# Patient Record
Sex: FEMALE | Race: WHITE | Employment: OTHER | URBAN - METROPOLITAN AREA
[De-identification: names, ages, dates, MRNs, and addresses within clinical notes are randomized per-mention and may not be internally consistent; named-entity substitution may affect disease eponyms.]

---

## 2017-01-04 ENCOUNTER — PATIENT MESSAGE (OUTPATIENT)
Dept: FAMILY MEDICINE CLINIC | Age: 77
End: 2017-01-04

## 2017-01-06 ENCOUNTER — TELEPHONE (OUTPATIENT)
Dept: FAMILY MEDICINE CLINIC | Age: 77
End: 2017-01-06

## 2017-01-06 RX ORDER — GUAIFENESIN AND CODEINE PHOSPHATE 100; 10 MG/5ML; MG/5ML
5 SOLUTION ORAL 4 TIMES DAILY PRN
Qty: 180 ML | Refills: 0 | OUTPATIENT
Start: 2017-01-06 | End: 2017-02-17 | Stop reason: ALTCHOICE

## 2017-01-06 NOTE — TELEPHONE ENCOUNTER
Patient advised may need an office visit for anything more than for the cough. She is ok with just a cough suppressant being sent to her [address] if physician will do that.

## 2017-01-06 NOTE — TELEPHONE ENCOUNTER
Patient called with coughing and congestion. She is coughing to the point of almost throwing up. She would like a prescription called in. Last seen 12-2-16. She said she had the symptoms then.

## 2017-01-13 ENCOUNTER — OFFICE VISIT (OUTPATIENT)
Dept: ORTHOPEDIC SURGERY | Age: 77
End: 2017-01-13

## 2017-01-13 VITALS
WEIGHT: 164.9 LBS | BODY MASS INDEX: 32.38 KG/M2 | DIASTOLIC BLOOD PRESSURE: 67 MMHG | SYSTOLIC BLOOD PRESSURE: 122 MMHG | HEIGHT: 60 IN | HEART RATE: 73 BPM

## 2017-01-13 DIAGNOSIS — G89.29 CHRONIC RIGHT-SIDED LOW BACK PAIN WITH RIGHT-SIDED SCIATICA: Primary | ICD-10-CM

## 2017-01-13 DIAGNOSIS — M54.41 CHRONIC RIGHT-SIDED LOW BACK PAIN WITH RIGHT-SIDED SCIATICA: Primary | ICD-10-CM

## 2017-01-13 DIAGNOSIS — M51.36 DDD (DEGENERATIVE DISC DISEASE), LUMBAR: ICD-10-CM

## 2017-01-13 PROCEDURE — 99213 OFFICE O/P EST LOW 20 MIN: CPT | Performed by: PHYSICIAN ASSISTANT

## 2017-01-13 RX ORDER — HYDROCODONE BITARTRATE AND ACETAMINOPHEN 5; 325 MG/1; MG/1
TABLET ORAL
Qty: 30 TABLET | Refills: 0 | Status: SHIPPED | OUTPATIENT
Start: 2017-01-13 | End: 2017-02-17 | Stop reason: SDUPTHER

## 2017-02-03 RX ORDER — MECLIZINE HYDROCHLORIDE 25 MG/1
TABLET ORAL
Qty: 30 TABLET | Refills: 2 | Status: SHIPPED | OUTPATIENT
Start: 2017-02-03 | End: 2018-05-09

## 2017-02-16 RX ORDER — VALACYCLOVIR HYDROCHLORIDE 1 G/1
TABLET, FILM COATED ORAL
Qty: 4 TABLET | Refills: 1 | Status: SHIPPED | OUTPATIENT
Start: 2017-02-16 | End: 2017-02-17 | Stop reason: SDUPTHER

## 2017-02-17 ENCOUNTER — OFFICE VISIT (OUTPATIENT)
Dept: FAMILY MEDICINE CLINIC | Age: 77
End: 2017-02-17

## 2017-02-17 ENCOUNTER — TELEPHONE (OUTPATIENT)
Dept: CASE MANAGEMENT | Age: 77
End: 2017-02-17

## 2017-02-17 VITALS — HEART RATE: 64 BPM | SYSTOLIC BLOOD PRESSURE: 140 MMHG | DIASTOLIC BLOOD PRESSURE: 70 MMHG

## 2017-02-17 DIAGNOSIS — R10.9 ABDOMINAL CRAMPING: ICD-10-CM

## 2017-02-17 DIAGNOSIS — R53.83 FATIGUE, UNSPECIFIED TYPE: Primary | ICD-10-CM

## 2017-02-17 DIAGNOSIS — R42 LIGHTHEADEDNESS: ICD-10-CM

## 2017-02-17 LAB
BASOPHILS ABSOLUTE: 0.1 K/UL (ref 0–0.2)
BASOPHILS RELATIVE PERCENT: 0.6 %
EOSINOPHILS ABSOLUTE: 0.4 K/UL (ref 0–0.6)
EOSINOPHILS RELATIVE PERCENT: 4.9 %
HCT VFR BLD CALC: 38.3 % (ref 36–48)
HEMOGLOBIN: 12.4 G/DL (ref 12–16)
LYMPHOCYTES ABSOLUTE: 2.6 K/UL (ref 1–5.1)
LYMPHOCYTES RELATIVE PERCENT: 33.2 %
MCH RBC QN AUTO: 28.9 PG (ref 26–34)
MCHC RBC AUTO-ENTMCNC: 32.4 G/DL (ref 31–36)
MCV RBC AUTO: 89.1 FL (ref 80–100)
MONOCYTES ABSOLUTE: 0.6 K/UL (ref 0–1.3)
MONOCYTES RELATIVE PERCENT: 7.9 %
NEUTROPHILS ABSOLUTE: 4.2 K/UL (ref 1.7–7.7)
NEUTROPHILS RELATIVE PERCENT: 53.4 %
PDW BLD-RTO: 14.6 % (ref 12.4–15.4)
PLATELET # BLD: 329 K/UL (ref 135–450)
PMV BLD AUTO: 8.1 FL (ref 5–10.5)
RBC # BLD: 4.3 M/UL (ref 4–5.2)
SEDIMENTATION RATE, ERYTHROCYTE: 13 MM/HR (ref 0–30)
TSH SERPL DL<=0.05 MIU/L-ACNC: 2.61 UIU/ML (ref 0.27–4.2)
WBC # BLD: 7.8 K/UL (ref 4–11)

## 2017-02-17 PROCEDURE — 36415 COLL VENOUS BLD VENIPUNCTURE: CPT | Performed by: FAMILY MEDICINE

## 2017-02-17 PROCEDURE — 99214 OFFICE O/P EST MOD 30 MIN: CPT | Performed by: FAMILY MEDICINE

## 2017-02-17 RX ORDER — DICYCLOMINE HYDROCHLORIDE 10 MG/1
10 CAPSULE ORAL 4 TIMES DAILY PRN
Qty: 90 CAPSULE | Refills: 0 | Status: SHIPPED | OUTPATIENT
Start: 2017-02-17 | End: 2019-06-11 | Stop reason: SDUPTHER

## 2017-02-17 RX ORDER — VALACYCLOVIR HYDROCHLORIDE 1 G/1
1000 TABLET, FILM COATED ORAL 3 TIMES DAILY
Qty: 21 TABLET | Refills: 3 | Status: SHIPPED | OUTPATIENT
Start: 2017-02-17 | End: 2018-05-09 | Stop reason: ALTCHOICE

## 2017-02-20 DIAGNOSIS — R42 LIGHTHEADEDNESS: Primary | ICD-10-CM

## 2017-02-22 ENCOUNTER — PATIENT MESSAGE (OUTPATIENT)
Dept: FAMILY MEDICINE CLINIC | Age: 77
End: 2017-02-22

## 2017-02-22 DIAGNOSIS — F17.210 NICOTINE DEPENDENCE, CIGARETTES, UNCOMPLICATED: ICD-10-CM

## 2017-02-22 DIAGNOSIS — Z87.891 PERSONAL HISTORY OF TOBACCO USE: ICD-10-CM

## 2017-02-22 DIAGNOSIS — F17.200 SMOKER: Primary | ICD-10-CM

## 2017-03-03 ENCOUNTER — HOSPITAL ENCOUNTER (OUTPATIENT)
Dept: CT IMAGING | Age: 77
Discharge: HOME | End: 2017-03-03
Attending: FAMILY MEDICINE | Admitting: FAMILY MEDICINE

## 2017-03-03 DIAGNOSIS — Z87.891 PERSONAL HISTORY OF TOBACCO USE: ICD-10-CM

## 2017-03-03 DIAGNOSIS — R42 DIZZINESS AND GIDDINESS: ICD-10-CM

## 2017-03-03 DIAGNOSIS — F17.210 NICOTINE DEPENDENCE, CIGARETTES, UNCOMPLICATED: ICD-10-CM

## 2017-03-14 ENCOUNTER — HOSPITAL ENCOUNTER (OUTPATIENT)
Dept: SURGERY | Age: 77
Discharge: HOME | End: 2017-03-14
Attending: INTERNAL MEDICINE | Admitting: INTERNAL MEDICINE

## 2017-03-14 VITALS
TEMPERATURE: 98.2 F | WEIGHT: 163 LBS | HEIGHT: 60 IN | RESPIRATION RATE: 18 BRPM | SYSTOLIC BLOOD PRESSURE: 145 MMHG | BODY MASS INDEX: 32 KG/M2 | OXYGEN SATURATION: 95 % | HEART RATE: 72 BPM | DIASTOLIC BLOOD PRESSURE: 71 MMHG

## 2017-03-14 RX ORDER — SODIUM CHLORIDE, SODIUM LACTATE, POTASSIUM CHLORIDE, CALCIUM CHLORIDE 600; 310; 30; 20 MG/100ML; MG/100ML; MG/100ML; MG/100ML
INJECTION, SOLUTION INTRAVENOUS CONTINUOUS
Status: DISCONTINUED | OUTPATIENT
Start: 2017-03-14 | End: 2017-03-15 | Stop reason: HOSPADM

## 2017-03-14 RX ORDER — LIDOCAINE HYDROCHLORIDE 10 MG/ML
0.1 INJECTION, SOLUTION INFILTRATION; PERINEURAL ONCE
Status: DISCONTINUED | OUTPATIENT
Start: 2017-03-14 | End: 2017-03-15 | Stop reason: HOSPADM

## 2017-03-14 RX ADMIN — SODIUM CHLORIDE, SODIUM LACTATE, POTASSIUM CHLORIDE, CALCIUM CHLORIDE: 600; 310; 30; 20 INJECTION, SOLUTION INTRAVENOUS at 13:25

## 2017-03-14 ASSESSMENT — PAIN SCALES - GENERAL: PAINLEVEL_OUTOF10: 0

## 2017-03-14 ASSESSMENT — PAIN - FUNCTIONAL ASSESSMENT: PAIN_FUNCTIONAL_ASSESSMENT: 0-10

## 2017-04-21 ENCOUNTER — OFFICE VISIT (OUTPATIENT)
Dept: ORTHOPEDIC SURGERY | Age: 77
End: 2017-04-21

## 2017-04-21 VITALS — BODY MASS INDEX: 31.99 KG/M2 | HEIGHT: 60 IN | WEIGHT: 162.92 LBS

## 2017-04-21 DIAGNOSIS — M19.012 ARTHRITIS OF LEFT SHOULDER REGION: ICD-10-CM

## 2017-04-21 DIAGNOSIS — M25.512 PAIN, JOINT, SHOULDER, LEFT: Primary | ICD-10-CM

## 2017-04-21 DIAGNOSIS — M75.102 TEAR OF LEFT ROTATOR CUFF, UNSPECIFIED TEAR EXTENT: ICD-10-CM

## 2017-04-21 PROCEDURE — 73030 X-RAY EXAM OF SHOULDER: CPT | Performed by: PHYSICIAN ASSISTANT

## 2017-04-21 PROCEDURE — 99213 OFFICE O/P EST LOW 20 MIN: CPT | Performed by: PHYSICIAN ASSISTANT

## 2017-04-24 ENCOUNTER — TELEPHONE (OUTPATIENT)
Dept: ORTHOPEDIC SURGERY | Age: 77
End: 2017-04-24

## 2017-04-24 ENCOUNTER — OFFICE VISIT (OUTPATIENT)
Dept: FAMILY MEDICINE CLINIC | Age: 77
End: 2017-04-24

## 2017-04-24 VITALS
BODY MASS INDEX: 32.2 KG/M2 | DIASTOLIC BLOOD PRESSURE: 78 MMHG | SYSTOLIC BLOOD PRESSURE: 138 MMHG | WEIGHT: 164 LBS | TEMPERATURE: 96.4 F

## 2017-04-24 DIAGNOSIS — J40 BRONCHITIS: Primary | ICD-10-CM

## 2017-04-24 DIAGNOSIS — E78.5 DYSLIPIDEMIA WITH HIGH LDL AND LOW HDL: ICD-10-CM

## 2017-04-24 DIAGNOSIS — F32.89 OTHER DEPRESSION: Chronic | ICD-10-CM

## 2017-04-24 DIAGNOSIS — F17.200 SMOKER: ICD-10-CM

## 2017-04-24 DIAGNOSIS — E78.1 HYPERTRIGLYCERIDEMIA: ICD-10-CM

## 2017-04-24 PROCEDURE — 99213 OFFICE O/P EST LOW 20 MIN: CPT | Performed by: FAMILY MEDICINE

## 2017-04-24 RX ORDER — VARENICLINE TARTRATE 25 MG
KIT ORAL
Qty: 1 EACH | Refills: 0 | Status: SHIPPED | OUTPATIENT
Start: 2017-04-24 | End: 2018-07-09 | Stop reason: ALTCHOICE

## 2017-04-24 RX ORDER — AZITHROMYCIN 250 MG/1
TABLET, FILM COATED ORAL
Qty: 1 PACKET | Refills: 0 | Status: SHIPPED | OUTPATIENT
Start: 2017-04-24 | End: 2017-05-04

## 2017-04-24 RX ORDER — SIMVASTATIN 20 MG
TABLET ORAL
Qty: 30 TABLET | Refills: 1 | Status: SHIPPED | OUTPATIENT
Start: 2017-04-24 | End: 2017-08-15 | Stop reason: SDUPTHER

## 2017-04-24 RX ORDER — FENOFIBRATE 145 MG/1
TABLET, COATED ORAL
Qty: 30 TABLET | Refills: 1 | Status: SHIPPED | OUTPATIENT
Start: 2017-04-24 | End: 2017-08-15 | Stop reason: SDUPTHER

## 2017-04-24 RX ORDER — ESCITALOPRAM OXALATE 20 MG/1
TABLET ORAL
Qty: 30 TABLET | Refills: 1 | Status: SHIPPED | OUTPATIENT
Start: 2017-04-24 | End: 2017-08-15 | Stop reason: SDUPTHER

## 2017-04-24 RX ORDER — GUAIFENESIN AND CODEINE PHOSPHATE 100; 10 MG/5ML; MG/5ML
5 SOLUTION ORAL 4 TIMES DAILY PRN
Qty: 180 ML | Refills: 0 | Status: SHIPPED | OUTPATIENT
Start: 2017-04-24 | End: 2017-12-05 | Stop reason: ALTCHOICE

## 2017-04-28 ENCOUNTER — OFFICE VISIT (OUTPATIENT)
Dept: ORTHOPEDIC SURGERY | Age: 77
End: 2017-04-28

## 2017-04-28 VITALS
BODY MASS INDEX: 32 KG/M2 | SYSTOLIC BLOOD PRESSURE: 163 MMHG | HEIGHT: 60 IN | HEART RATE: 84 BPM | WEIGHT: 163 LBS | DIASTOLIC BLOOD PRESSURE: 78 MMHG

## 2017-04-28 DIAGNOSIS — Z79.891 LONG TERM (CURRENT) USE OF OPIATE ANALGESIC: Primary | ICD-10-CM

## 2017-04-28 PROCEDURE — 80305 DRUG TEST PRSMV DIR OPT OBS: CPT | Performed by: PHYSICIAN ASSISTANT

## 2017-04-28 PROCEDURE — 99214 OFFICE O/P EST MOD 30 MIN: CPT | Performed by: PHYSICIAN ASSISTANT

## 2017-04-28 RX ORDER — HYDROCODONE BITARTRATE AND ACETAMINOPHEN 5; 325 MG/1; MG/1
1 TABLET ORAL DAILY PRN
Qty: 30 TABLET | Refills: 0 | Status: SHIPPED | OUTPATIENT
Start: 2017-06-24 | End: 2017-12-05 | Stop reason: SDUPTHER

## 2017-04-28 RX ORDER — HYDROCODONE BITARTRATE AND ACETAMINOPHEN 5; 325 MG/1; MG/1
1 TABLET ORAL DAILY PRN
Qty: 30 TABLET | Refills: 0 | Status: SHIPPED | OUTPATIENT
Start: 2017-05-26 | End: 2017-12-05 | Stop reason: SDUPTHER

## 2017-04-28 RX ORDER — HYDROCODONE BITARTRATE AND ACETAMINOPHEN 5; 325 MG/1; MG/1
1 TABLET ORAL DAILY PRN
Qty: 30 TABLET | Refills: 0 | Status: SHIPPED | OUTPATIENT
Start: 2017-04-28 | End: 2017-12-05 | Stop reason: SDUPTHER

## 2017-05-12 ENCOUNTER — TELEPHONE (OUTPATIENT)
Dept: CASE MANAGEMENT | Age: 77
End: 2017-05-12

## 2017-05-26 ENCOUNTER — OFFICE VISIT (OUTPATIENT)
Dept: ORTHOPEDIC SURGERY | Age: 77
End: 2017-05-26

## 2017-05-26 VITALS — WEIGHT: 163 LBS | BODY MASS INDEX: 32 KG/M2 | HEIGHT: 60 IN

## 2017-05-26 DIAGNOSIS — M75.122 COMPLETE TEAR OF LEFT ROTATOR CUFF: ICD-10-CM

## 2017-05-26 PROCEDURE — 99213 OFFICE O/P EST LOW 20 MIN: CPT | Performed by: ORTHOPAEDIC SURGERY

## 2017-06-05 RX ORDER — VARENICLINE TARTRATE 1 MG/1
TABLET, FILM COATED ORAL
Qty: 56 TABLET | Refills: 2 | Status: SHIPPED | OUTPATIENT
Start: 2017-06-05 | End: 2017-10-20 | Stop reason: SDUPTHER

## 2017-06-13 ENCOUNTER — OFFICE VISIT (OUTPATIENT)
Dept: ORTHOPEDIC SURGERY | Age: 77
End: 2017-06-13

## 2017-06-13 VITALS
SYSTOLIC BLOOD PRESSURE: 164 MMHG | BODY MASS INDEX: 31.99 KG/M2 | HEART RATE: 77 BPM | DIASTOLIC BLOOD PRESSURE: 77 MMHG | WEIGHT: 162.92 LBS | HEIGHT: 60 IN

## 2017-06-13 DIAGNOSIS — M79.671 FOOT PAIN, RIGHT: ICD-10-CM

## 2017-06-13 DIAGNOSIS — M19.071 OSTEOARTHRITIS OF TOE JOINT, RIGHT: Primary | ICD-10-CM

## 2017-06-13 PROBLEM — M19.079 OSTEOARTHRITIS OF TOE JOINT: Status: ACTIVE | Noted: 2017-06-13

## 2017-06-13 PROCEDURE — L3260 AMBULATORY SURGICAL BOOT EAC: HCPCS | Performed by: PHYSICIAN ASSISTANT

## 2017-06-13 PROCEDURE — 99213 OFFICE O/P EST LOW 20 MIN: CPT | Performed by: PHYSICIAN ASSISTANT

## 2017-06-13 PROCEDURE — 73630 X-RAY EXAM OF FOOT: CPT | Performed by: PHYSICIAN ASSISTANT

## 2017-07-07 ENCOUNTER — OFFICE VISIT (OUTPATIENT)
Dept: FAMILY MEDICINE CLINIC | Age: 77
End: 2017-07-07

## 2017-07-07 VITALS
BODY MASS INDEX: 33.69 KG/M2 | RESPIRATION RATE: 16 BRPM | TEMPERATURE: 98.2 F | DIASTOLIC BLOOD PRESSURE: 68 MMHG | WEIGHT: 171.6 LBS | SYSTOLIC BLOOD PRESSURE: 136 MMHG | HEART RATE: 72 BPM

## 2017-07-07 DIAGNOSIS — E78.6 HDL LIPOPROTEIN DEFICIENCY: Chronic | ICD-10-CM

## 2017-07-07 DIAGNOSIS — Z01.818 PREOP EXAMINATION: Primary | ICD-10-CM

## 2017-07-07 DIAGNOSIS — I10 ESSENTIAL HYPERTENSION: ICD-10-CM

## 2017-07-07 DIAGNOSIS — M19.012 OSTEOARTHRITIS OF LEFT SHOULDER, UNSPECIFIED OSTEOARTHRITIS TYPE: ICD-10-CM

## 2017-07-07 DIAGNOSIS — E61.1 LOW IRON: ICD-10-CM

## 2017-07-07 LAB
A/G RATIO: 1.5 (ref 1.1–2.2)
ALBUMIN SERPL-MCNC: 4.5 G/DL (ref 3.4–5)
ALP BLD-CCNC: 46 U/L (ref 40–129)
ALT SERPL-CCNC: 21 U/L (ref 10–40)
ANION GAP SERPL CALCULATED.3IONS-SCNC: 15 MMOL/L (ref 3–16)
AST SERPL-CCNC: 16 U/L (ref 15–37)
BILIRUB SERPL-MCNC: 0.3 MG/DL (ref 0–1)
BUN BLDV-MCNC: 31 MG/DL (ref 7–20)
CALCIUM SERPL-MCNC: 9.8 MG/DL (ref 8.3–10.6)
CHLORIDE BLD-SCNC: 103 MMOL/L (ref 99–110)
CHOLESTEROL, TOTAL: 179 MG/DL (ref 0–199)
CO2: 24 MMOL/L (ref 21–32)
CREAT SERPL-MCNC: 0.6 MG/DL (ref 0.6–1.2)
GFR AFRICAN AMERICAN: >60
GFR NON-AFRICAN AMERICAN: >60
GLOBULIN: 3.1 G/DL
GLUCOSE BLD-MCNC: 88 MG/DL (ref 70–99)
HCT VFR BLD CALC: 36.7 % (ref 36–48)
HDLC SERPL-MCNC: 42 MG/DL (ref 40–60)
HEMOGLOBIN: 12.2 G/DL (ref 12–16)
IRON SATURATION: 29 % (ref 15–50)
IRON: 108 UG/DL (ref 37–145)
LDL CHOLESTEROL CALCULATED: 106 MG/DL
MCH RBC QN AUTO: 29.3 PG (ref 26–34)
MCHC RBC AUTO-ENTMCNC: 33.2 G/DL (ref 31–36)
MCV RBC AUTO: 88.2 FL (ref 80–100)
PDW BLD-RTO: 13.9 % (ref 12.4–15.4)
PLATELET # BLD: 307 K/UL (ref 135–450)
PMV BLD AUTO: 8.3 FL (ref 5–10.5)
POTASSIUM SERPL-SCNC: 4.6 MMOL/L (ref 3.5–5.1)
RBC # BLD: 4.16 M/UL (ref 4–5.2)
SODIUM BLD-SCNC: 142 MMOL/L (ref 136–145)
TOTAL IRON BINDING CAPACITY: 368 UG/DL (ref 260–445)
TOTAL PROTEIN: 7.6 G/DL (ref 6.4–8.2)
TRIGL SERPL-MCNC: 155 MG/DL (ref 0–150)
VLDLC SERPL CALC-MCNC: 31 MG/DL
WBC # BLD: 7.9 K/UL (ref 4–11)

## 2017-07-07 PROCEDURE — 4040F PNEUMOC VAC/ADMIN/RCVD: CPT | Performed by: PHYSICIAN ASSISTANT

## 2017-07-07 PROCEDURE — G8400 PT W/DXA NO RESULTS DOC: HCPCS | Performed by: PHYSICIAN ASSISTANT

## 2017-07-07 PROCEDURE — 99213 OFFICE O/P EST LOW 20 MIN: CPT | Performed by: PHYSICIAN ASSISTANT

## 2017-07-07 PROCEDURE — 93000 ELECTROCARDIOGRAM COMPLETE: CPT | Performed by: PHYSICIAN ASSISTANT

## 2017-07-07 PROCEDURE — 1090F PRES/ABSN URINE INCON ASSESS: CPT | Performed by: PHYSICIAN ASSISTANT

## 2017-07-07 PROCEDURE — 3017F COLORECTAL CA SCREEN DOC REV: CPT | Performed by: PHYSICIAN ASSISTANT

## 2017-07-07 PROCEDURE — 1036F TOBACCO NON-USER: CPT | Performed by: PHYSICIAN ASSISTANT

## 2017-07-07 PROCEDURE — 36415 COLL VENOUS BLD VENIPUNCTURE: CPT | Performed by: PHYSICIAN ASSISTANT

## 2017-07-07 PROCEDURE — G8427 DOCREV CUR MEDS BY ELIG CLIN: HCPCS | Performed by: PHYSICIAN ASSISTANT

## 2017-07-07 PROCEDURE — G8417 CALC BMI ABV UP PARAM F/U: HCPCS | Performed by: PHYSICIAN ASSISTANT

## 2017-07-07 PROCEDURE — 1123F ACP DISCUSS/DSCN MKR DOCD: CPT | Performed by: PHYSICIAN ASSISTANT

## 2017-07-18 ENCOUNTER — TELEPHONE (OUTPATIENT)
Dept: ORTHOPEDIC SURGERY | Age: 77
End: 2017-07-18

## 2017-07-21 LAB
ABO GROUPING: NORMAL
ANION GAP SERPL CALCULATED.3IONS-SCNC: 10 MMOL/L (ref 5–13)
ANTIBODY SCREEN: NEGATIVE
APTT: 30.1 SECONDS (ref 23.1–37.6)
BACTERIA: ABNORMAL /HPF
BILIRUBIN URINE: NEGATIVE
BUN / CREAT RATIO: 32
BUN BLDV-MCNC: 23 MG/DL (ref 7–25)
CALCIUM SERPL-MCNC: 10 MG/DL (ref 8.4–10.5)
CHLORIDE BLD-SCNC: 104 MMOL/L (ref 98–110)
CLARITY: CLEAR
CO2: 26 MMOL/L (ref 22–29)
COLOR: YELLOW
CREAT SERPL-MCNC: 0.72 MG/DL (ref 0.5–1.2)
EPITHELIAL CELLS, UA: 1 /HPF (ref 0–5)
ERYTHROCYTES URINE: ABNORMAL
GFR AFRICAN AMERICAN: 96 SEE NOTE
GFR NON-AFRICAN AMERICAN: 79 SEE NOTE
GLUCOSE BLD-MCNC: 122 MG/DL (ref 70–99)
GLUCOSE URINE: ABNORMAL MG/DL
HCT VFR BLD CALC: 36.7 % (ref 35–45)
HEMOGLOBIN: 12.4 G/DL (ref 11.7–15.5)
INR BLD: 1.1 (ref 0.9–1.1)
KETONES, URINE: NEGATIVE MG/DL
LEUKOCYTE ESTERASE, URINE: NEGATIVE
LEUKOCYTES, UA: 1 /HPF (ref 0–5)
MCH RBC QN AUTO: 29.1 PG (ref 27–33)
MCHC RBC AUTO-ENTMCNC: 33.8 G/DL (ref 32–36)
MCV RBC AUTO: 86 FL (ref 80–100)
NITRITE, URINE: NEGATIVE
PDW BLD-RTO: 13.7 % (ref 11–15)
PH UA: 5 (ref 5–8)
PLATELET # BLD: 345 10*3/UL (ref 140–400)
PMV BLD AUTO: 8.5 FL (ref 7.5–11.5)
POTASSIUM SERPL-SCNC: 4.2 MMOL/L (ref 3.5–5.1)
PROTEIN UA: NEGATIVE MG/DL
PROTHROMBIN TIME: 11.9 SECONDS (ref 9.4–12.6)
RBC # BLD: 4.27 10*6/UL (ref 3.8–5.1)
RBC UA: 4 /HPF (ref 0–3)
RH FACTOR: NEGATIVE
SODIUM BLD-SCNC: 140 MMOL/L (ref 135–146)
SPECIFIC GRAVITY UA: 1.02 (ref 1–1.03)
STAPH AUREUS SCREEN: NEGATIVE
STAPH AUREUS.METHICILLIN RESISTANT DNA: NEGATIVE
UROBILINOGEN, URINE: <2 MG/DL
WBC # BLD: 8.4 10*3/UL (ref 3.8–10.8)

## 2017-07-22 LAB — BACTERIA IDENTIFIED: NORMAL

## 2017-07-27 DIAGNOSIS — M19.012 OSTEOARTHRITIS OF LEFT SHOULDER, UNSPECIFIED OSTEOARTHRITIS TYPE: Chronic | ICD-10-CM

## 2017-07-27 DIAGNOSIS — M75.122 COMPLETE TEAR OF LEFT ROTATOR CUFF: Primary | ICD-10-CM

## 2017-08-01 ENCOUNTER — HOSPITAL ENCOUNTER (OUTPATIENT)
Dept: PHYSICAL THERAPY | Age: 77
Discharge: HOME | End: 2017-08-31
Attending: ORTHOPAEDIC SURGERY | Admitting: ORTHOPAEDIC SURGERY

## 2017-08-08 ENCOUNTER — OFFICE VISIT (OUTPATIENT)
Dept: ORTHOPEDIC SURGERY | Age: 77
End: 2017-08-08

## 2017-08-08 ENCOUNTER — HOSPITAL ENCOUNTER (OUTPATIENT)
Dept: PHYSICAL THERAPY | Age: 77
Discharge: HOME | End: 2017-07-31
Admitting: ORTHOPAEDIC SURGERY

## 2017-08-08 ENCOUNTER — HOSPITAL ENCOUNTER (OUTPATIENT)
Dept: PHYSICAL THERAPY | Age: 77
Discharge: HOME | End: 2017-08-08
Admitting: ORTHOPAEDIC SURGERY

## 2017-08-08 VITALS — HEIGHT: 60 IN | WEIGHT: 171.52 LBS | BODY MASS INDEX: 33.67 KG/M2

## 2017-08-08 DIAGNOSIS — M25.512 LEFT SHOULDER PAIN, UNSPECIFIED CHRONICITY: Primary | ICD-10-CM

## 2017-08-08 PROCEDURE — 99024 POSTOP FOLLOW-UP VISIT: CPT | Performed by: ORTHOPAEDIC SURGERY

## 2017-08-08 PROCEDURE — 73030 X-RAY EXAM OF SHOULDER: CPT | Performed by: ORTHOPAEDIC SURGERY

## 2017-08-15 ENCOUNTER — HOSPITAL ENCOUNTER (OUTPATIENT)
Dept: PHYSICAL THERAPY | Age: 77
Discharge: HOME | End: 2017-08-15
Admitting: ORTHOPAEDIC SURGERY

## 2017-08-15 DIAGNOSIS — E78.1 HYPERTRIGLYCERIDEMIA: ICD-10-CM

## 2017-08-15 DIAGNOSIS — I10 ESSENTIAL HYPERTENSION: Chronic | ICD-10-CM

## 2017-08-15 DIAGNOSIS — E78.5 DYSLIPIDEMIA WITH HIGH LDL AND LOW HDL: ICD-10-CM

## 2017-08-16 RX ORDER — FENOFIBRATE 145 MG/1
TABLET, COATED ORAL
Qty: 90 TABLET | Refills: 1 | Status: SHIPPED | OUTPATIENT
Start: 2017-08-16 | End: 2018-06-14 | Stop reason: SDUPTHER

## 2017-08-16 RX ORDER — ESCITALOPRAM OXALATE 20 MG/1
TABLET ORAL
Qty: 90 TABLET | Refills: 1 | Status: SHIPPED | OUTPATIENT
Start: 2017-08-16 | End: 2018-03-06 | Stop reason: SDUPTHER

## 2017-08-16 RX ORDER — SIMVASTATIN 20 MG
TABLET ORAL
Qty: 90 TABLET | Refills: 1 | Status: SHIPPED | OUTPATIENT
Start: 2017-08-16 | End: 2018-06-14 | Stop reason: SDUPTHER

## 2017-08-16 RX ORDER — LOSARTAN POTASSIUM AND HYDROCHLOROTHIAZIDE 12.5; 1 MG/1; MG/1
TABLET ORAL
Qty: 30 TABLET | Refills: 3 | Status: SHIPPED | OUTPATIENT
Start: 2017-08-16 | End: 2017-12-16 | Stop reason: SDUPTHER

## 2017-08-16 RX ORDER — DICLOFENAC SODIUM 75 MG/1
TABLET, DELAYED RELEASE ORAL
Qty: 60 TABLET | Refills: 2 | Status: SHIPPED | OUTPATIENT
Start: 2017-08-16 | End: 2017-11-16 | Stop reason: SDUPTHER

## 2017-08-22 ENCOUNTER — HOSPITAL ENCOUNTER (OUTPATIENT)
Dept: PHYSICAL THERAPY | Age: 77
Discharge: HOME | End: 2017-08-22
Admitting: ORTHOPAEDIC SURGERY

## 2017-08-22 ENCOUNTER — OFFICE VISIT (OUTPATIENT)
Dept: ORTHOPEDIC SURGERY | Age: 77
End: 2017-08-22

## 2017-08-22 VITALS
SYSTOLIC BLOOD PRESSURE: 121 MMHG | WEIGHT: 171.52 LBS | DIASTOLIC BLOOD PRESSURE: 71 MMHG | HEART RATE: 80 BPM | BODY MASS INDEX: 33.67 KG/M2 | HEIGHT: 60 IN

## 2017-08-22 DIAGNOSIS — M51.36 DDD (DEGENERATIVE DISC DISEASE), LUMBAR: Primary | ICD-10-CM

## 2017-08-22 DIAGNOSIS — M54.16 LUMBAR RADICULITIS: ICD-10-CM

## 2017-08-22 PROCEDURE — G8427 DOCREV CUR MEDS BY ELIG CLIN: HCPCS | Performed by: PHYSICIAN ASSISTANT

## 2017-08-22 PROCEDURE — 1036F TOBACCO NON-USER: CPT | Performed by: PHYSICIAN ASSISTANT

## 2017-08-22 PROCEDURE — G8400 PT W/DXA NO RESULTS DOC: HCPCS | Performed by: PHYSICIAN ASSISTANT

## 2017-08-22 PROCEDURE — 3017F COLORECTAL CA SCREEN DOC REV: CPT | Performed by: PHYSICIAN ASSISTANT

## 2017-08-22 PROCEDURE — 99213 OFFICE O/P EST LOW 20 MIN: CPT | Performed by: PHYSICIAN ASSISTANT

## 2017-08-22 PROCEDURE — 1090F PRES/ABSN URINE INCON ASSESS: CPT | Performed by: PHYSICIAN ASSISTANT

## 2017-08-22 PROCEDURE — 4040F PNEUMOC VAC/ADMIN/RCVD: CPT | Performed by: PHYSICIAN ASSISTANT

## 2017-08-22 PROCEDURE — G8417 CALC BMI ABV UP PARAM F/U: HCPCS | Performed by: PHYSICIAN ASSISTANT

## 2017-08-22 PROCEDURE — 1123F ACP DISCUSS/DSCN MKR DOCD: CPT | Performed by: PHYSICIAN ASSISTANT

## 2017-08-22 RX ORDER — HYDROCODONE BITARTRATE AND ACETAMINOPHEN 5; 325 MG/1; MG/1
TABLET ORAL
Qty: 30 TABLET | Refills: 0 | Status: SHIPPED | OUTPATIENT
Start: 2017-09-20 | End: 2017-12-05 | Stop reason: SDUPTHER

## 2017-08-22 RX ORDER — HYDROCODONE BITARTRATE AND ACETAMINOPHEN 5; 325 MG/1; MG/1
TABLET ORAL
Qty: 30 TABLET | Refills: 0 | Status: SHIPPED | OUTPATIENT
Start: 2017-10-18 | End: 2017-12-05 | Stop reason: SDUPTHER

## 2017-08-22 RX ORDER — HYDROCODONE BITARTRATE AND ACETAMINOPHEN 5; 325 MG/1; MG/1
TABLET ORAL
Qty: 30 TABLET | Refills: 0 | Status: SHIPPED | OUTPATIENT
Start: 2017-08-22 | End: 2017-12-05 | Stop reason: SDUPTHER

## 2017-08-29 ENCOUNTER — HOSPITAL ENCOUNTER (OUTPATIENT)
Dept: PHYSICAL THERAPY | Age: 77
Discharge: HOME | End: 2017-08-29
Admitting: ORTHOPAEDIC SURGERY

## 2017-09-05 ENCOUNTER — HOSPITAL ENCOUNTER (OUTPATIENT)
Dept: PHYSICAL THERAPY | Age: 77
Discharge: HOME | End: 2017-09-05
Admitting: ORTHOPAEDIC SURGERY

## 2017-09-14 ENCOUNTER — HOSPITAL ENCOUNTER (OUTPATIENT)
Dept: PHYSICAL THERAPY | Age: 77
Discharge: HOME | End: 2017-09-14
Admitting: ORTHOPAEDIC SURGERY

## 2017-09-19 ENCOUNTER — HOSPITAL ENCOUNTER (OUTPATIENT)
Dept: PHYSICAL THERAPY | Age: 77
Discharge: HOME | End: 2017-09-19
Admitting: ORTHOPAEDIC SURGERY

## 2017-09-19 ENCOUNTER — OFFICE VISIT (OUTPATIENT)
Dept: ORTHOPEDIC SURGERY | Age: 77
End: 2017-09-19

## 2017-09-19 DIAGNOSIS — M19.012 OSTEOARTHRITIS OF LEFT SHOULDER, UNSPECIFIED OSTEOARTHRITIS TYPE: Chronic | ICD-10-CM

## 2017-09-19 DIAGNOSIS — R52 PAIN: Primary | ICD-10-CM

## 2017-09-19 DIAGNOSIS — M75.122 COMPLETE TEAR OF LEFT ROTATOR CUFF: ICD-10-CM

## 2017-09-19 PROCEDURE — 99024 POSTOP FOLLOW-UP VISIT: CPT | Performed by: ORTHOPAEDIC SURGERY

## 2017-09-19 PROCEDURE — 73030 X-RAY EXAM OF SHOULDER: CPT | Performed by: ORTHOPAEDIC SURGERY

## 2017-10-20 DIAGNOSIS — F17.200 SMOKER: Primary | Chronic | ICD-10-CM

## 2017-10-20 RX ORDER — VARENICLINE TARTRATE 1 MG/1
TABLET, FILM COATED ORAL
Qty: 56 TABLET | Refills: 2 | Status: SHIPPED | OUTPATIENT
Start: 2017-10-20 | End: 2019-02-13 | Stop reason: ALTCHOICE

## 2017-10-20 RX ORDER — VARENICLINE TARTRATE
KIT
Refills: 0 | OUTPATIENT
Start: 2017-10-20

## 2017-11-14 ENCOUNTER — OFFICE VISIT (OUTPATIENT)
Dept: ORTHOPEDIC SURGERY | Age: 77
End: 2017-11-14

## 2017-11-14 VITALS
WEIGHT: 171.52 LBS | BODY MASS INDEX: 33.67 KG/M2 | HEIGHT: 60 IN | HEART RATE: 86 BPM | SYSTOLIC BLOOD PRESSURE: 142 MMHG | DIASTOLIC BLOOD PRESSURE: 68 MMHG

## 2017-11-14 VITALS — WEIGHT: 171.52 LBS | BODY MASS INDEX: 33.67 KG/M2 | HEIGHT: 60 IN

## 2017-11-14 DIAGNOSIS — M75.122 COMPLETE TEAR OF LEFT ROTATOR CUFF: Primary | ICD-10-CM

## 2017-11-14 DIAGNOSIS — M51.36 DDD (DEGENERATIVE DISC DISEASE), LUMBAR: ICD-10-CM

## 2017-11-14 DIAGNOSIS — G89.4 CHRONIC PAIN SYNDROME: Primary | ICD-10-CM

## 2017-11-14 DIAGNOSIS — M54.16 LUMBAR RADICULITIS: ICD-10-CM

## 2017-11-14 PROCEDURE — 4040F PNEUMOC VAC/ADMIN/RCVD: CPT | Performed by: PHYSICIAN ASSISTANT

## 2017-11-14 PROCEDURE — 1123F ACP DISCUSS/DSCN MKR DOCD: CPT | Performed by: ORTHOPAEDIC SURGERY

## 2017-11-14 PROCEDURE — 1036F TOBACCO NON-USER: CPT | Performed by: PHYSICIAN ASSISTANT

## 2017-11-14 PROCEDURE — 99213 OFFICE O/P EST LOW 20 MIN: CPT | Performed by: PHYSICIAN ASSISTANT

## 2017-11-14 PROCEDURE — 1123F ACP DISCUSS/DSCN MKR DOCD: CPT | Performed by: PHYSICIAN ASSISTANT

## 2017-11-14 PROCEDURE — 3017F COLORECTAL CA SCREEN DOC REV: CPT | Performed by: PHYSICIAN ASSISTANT

## 2017-11-14 PROCEDURE — 4040F PNEUMOC VAC/ADMIN/RCVD: CPT | Performed by: ORTHOPAEDIC SURGERY

## 2017-11-14 PROCEDURE — G8484 FLU IMMUNIZE NO ADMIN: HCPCS | Performed by: ORTHOPAEDIC SURGERY

## 2017-11-14 PROCEDURE — G8417 CALC BMI ABV UP PARAM F/U: HCPCS | Performed by: ORTHOPAEDIC SURGERY

## 2017-11-14 PROCEDURE — G8417 CALC BMI ABV UP PARAM F/U: HCPCS | Performed by: PHYSICIAN ASSISTANT

## 2017-11-14 PROCEDURE — G8427 DOCREV CUR MEDS BY ELIG CLIN: HCPCS | Performed by: ORTHOPAEDIC SURGERY

## 2017-11-14 PROCEDURE — 1090F PRES/ABSN URINE INCON ASSESS: CPT | Performed by: PHYSICIAN ASSISTANT

## 2017-11-14 PROCEDURE — 1036F TOBACCO NON-USER: CPT | Performed by: ORTHOPAEDIC SURGERY

## 2017-11-14 PROCEDURE — 1090F PRES/ABSN URINE INCON ASSESS: CPT | Performed by: ORTHOPAEDIC SURGERY

## 2017-11-14 PROCEDURE — 99213 OFFICE O/P EST LOW 20 MIN: CPT | Performed by: ORTHOPAEDIC SURGERY

## 2017-11-14 PROCEDURE — G8427 DOCREV CUR MEDS BY ELIG CLIN: HCPCS | Performed by: PHYSICIAN ASSISTANT

## 2017-11-14 PROCEDURE — G8484 FLU IMMUNIZE NO ADMIN: HCPCS | Performed by: PHYSICIAN ASSISTANT

## 2017-11-14 PROCEDURE — G8400 PT W/DXA NO RESULTS DOC: HCPCS | Performed by: ORTHOPAEDIC SURGERY

## 2017-11-14 PROCEDURE — G8400 PT W/DXA NO RESULTS DOC: HCPCS | Performed by: PHYSICIAN ASSISTANT

## 2017-11-14 PROCEDURE — 3017F COLORECTAL CA SCREEN DOC REV: CPT | Performed by: ORTHOPAEDIC SURGERY

## 2017-11-14 RX ORDER — HYDROCODONE BITARTRATE AND ACETAMINOPHEN 5; 325 MG/1; MG/1
TABLET ORAL
Qty: 30 TABLET | Refills: 0 | Status: SHIPPED | OUTPATIENT
Start: 2017-12-22 | End: 2017-12-05 | Stop reason: SDUPTHER

## 2017-11-14 RX ORDER — HYDROCODONE BITARTRATE AND ACETAMINOPHEN 5; 325 MG/1; MG/1
TABLET ORAL
Qty: 30 TABLET | Refills: 0 | Status: SHIPPED | OUTPATIENT
Start: 2018-01-20 | End: 2017-12-05 | Stop reason: SDUPTHER

## 2017-11-14 RX ORDER — HYDROCODONE BITARTRATE AND ACETAMINOPHEN 5; 325 MG/1; MG/1
TABLET ORAL
Qty: 30 TABLET | Refills: 0 | Status: SHIPPED | OUTPATIENT
Start: 2017-11-24 | End: 2017-12-05 | Stop reason: SDUPTHER

## 2017-11-14 NOTE — PROGRESS NOTES
HISTORY OF PRESENT ILLNESS: The patient returns today 3.5 months after left total shoulder arthroplasty. They have attended physical therapy and perform home exercises as instructed. Motion and strength are improved in her left shoulder up. She is begun to have more right shoulder symptoms with catching nighttime discomfort. REVIEW OF SYSTEMS: Pertinent items are noted in the HPI. Review of symptoms reviewed from the Patient History Form and dated on 11/14/17 are available in the patient's chart under the Media tab. PHYSICAL EXAMINATION: Inspection of the left shoulder reveals a healed incision. The skin is warm. The deltoid contracts nicely. The distal neurovascular exam is grossly intact. Range of motion reveals 150° of active forward elevation. She can position her hand behind her head. She has internal rotation to the ipsilateral buttock. She has 5 minus out of 5 forward elevation and external rotation strength. Examination of the right shoulder reveals no atrophy or deformity. The skin is warm and dry. She has 130° of comfortable active forward elevation. She has difficulty bring her hand behind her head. She has 4+ out of 5 forward elevation and external rotation strength. There is some glenohumeral crepitance. The distal neurovascular exam is grossly intact. There is no glenohumeral instability    Cervical spine: The skin is warm and dry. There is no swelling, warmth, or erythema. Range of motion is within normal limits. There is no paraspinal or spinous process tenderness. Spurling's sign is negative and did not produce shoulder pain. The distal neurovascular exam is grossly intact. X-RAYS:  True AP and axillary views of the left shoulder reveal appropriately placed, well located total shoulder arthroplasty components. The lesser tuberosity is appropriately positioned    ASSESSMENT/PLAN:   1. Doing well after total shoulder arthroplasty.   The patient will continue with home exercises. She will return in 9 months for repeat evaluation. At that time I will repeat true AP and axillary views of the shoulder and she'll complete shoulder outcome forms  2. Right shoulder glenohumeral arthritis. She would like to proceed with right total shoulder arthroplasty but is waiting for information regarding cost for left total shoulder arthroplasty. She will contact us with a decision on how to proceed.
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
## 2017-11-16 RX ORDER — DICLOFENAC SODIUM 75 MG/1
TABLET, DELAYED RELEASE ORAL
Qty: 60 TABLET | Refills: 2 | Status: SHIPPED | OUTPATIENT
Start: 2017-11-16 | End: 2017-11-16 | Stop reason: SDUPTHER

## 2017-11-16 RX ORDER — DICLOFENAC SODIUM 75 MG/1
TABLET, DELAYED RELEASE ORAL
Qty: 60 TABLET | Refills: 2 | Status: SHIPPED | OUTPATIENT
Start: 2017-11-16 | End: 2018-06-14 | Stop reason: SDUPTHER

## 2017-12-01 ENCOUNTER — OFFICE VISIT (OUTPATIENT)
Dept: ORTHOPEDIC SURGERY | Age: 77
End: 2017-12-01

## 2017-12-01 DIAGNOSIS — M19.011 OSTEOARTHRITIS OF BOTH SHOULDERS, UNSPECIFIED OSTEOARTHRITIS TYPE: Chronic | ICD-10-CM

## 2017-12-01 DIAGNOSIS — M19.012 OSTEOARTHRITIS OF BOTH SHOULDERS, UNSPECIFIED OSTEOARTHRITIS TYPE: Chronic | ICD-10-CM

## 2017-12-01 DIAGNOSIS — M25.511 RIGHT SHOULDER PAIN, UNSPECIFIED CHRONICITY: Primary | ICD-10-CM

## 2017-12-01 PROCEDURE — 99999 PR OFFICE/OUTPT VISIT,PROCEDURE ONLY: CPT | Performed by: PHYSICIAN ASSISTANT

## 2017-12-04 ENCOUNTER — TELEPHONE (OUTPATIENT)
Dept: ORTHOPEDIC SURGERY | Age: 77
End: 2017-12-04

## 2017-12-04 NOTE — TELEPHONE ENCOUNTER
[id number]  [id number]     [facility]-NPR   PER AVAILITY, PT HAS MEDICARE A AS PRIMARY THEREFORE ANTHEM WILL NOT PRECERT

## 2017-12-05 ENCOUNTER — OFFICE VISIT (OUTPATIENT)
Dept: FAMILY MEDICINE CLINIC | Age: 77
End: 2017-12-05

## 2017-12-05 VITALS
DIASTOLIC BLOOD PRESSURE: 74 MMHG | SYSTOLIC BLOOD PRESSURE: 138 MMHG | TEMPERATURE: 98.8 F | BODY MASS INDEX: 35.73 KG/M2 | HEART RATE: 76 BPM | HEIGHT: 60 IN | OXYGEN SATURATION: 97 % | WEIGHT: 182 LBS

## 2017-12-05 DIAGNOSIS — Z86.79 HISTORY OF RHEUMATIC FEVER: Chronic | ICD-10-CM

## 2017-12-05 DIAGNOSIS — R01.1 CARDIAC MURMUR: ICD-10-CM

## 2017-12-05 DIAGNOSIS — Z01.818 PREOP EXAMINATION: Primary | ICD-10-CM

## 2017-12-05 LAB
ANION GAP SERPL CALCULATED.3IONS-SCNC: 10 MMOL/L (ref 5–13)
APTT: 28.9 SECONDS (ref 23.1–37.6)
BILIRUBIN URINE: NEGATIVE
BUN / CREAT RATIO: 31
BUN BLDV-MCNC: 22 MG/DL (ref 7–25)
CALCIUM SERPL-MCNC: 10.6 MG/DL (ref 8.4–10.5)
CHLORIDE BLD-SCNC: 101 MMOL/L (ref 98–110)
CLARITY: CLEAR
CO2: 29 MMOL/L (ref 22–29)
COLOR: YELLOW
CREAT SERPL-MCNC: 0.71 MG/DL (ref 0.5–1.2)
ERYTHROCYTES URINE: NEGATIVE
GFR AFRICAN AMERICAN: 97 SEE NOTE
GFR NON-AFRICAN AMERICAN: 80 SEE NOTE
GLUCOSE BLD-MCNC: 92 MG/DL (ref 70–99)
GLUCOSE URINE: NEGATIVE MG/DL
HCT VFR BLD CALC: 37.4 % (ref 35–45)
HEMOGLOBIN: 12.8 G/DL (ref 11.7–15.5)
INR BLD: 1 (ref 0.9–1.1)
KETONES, URINE: NEGATIVE MG/DL
LEUKOCYTE ESTERASE, URINE: NEGATIVE
MCH RBC QN AUTO: 28.5 PG (ref 27–33)
MCHC RBC AUTO-ENTMCNC: 34.2 G/DL (ref 32–36)
MCV RBC AUTO: 83.4 FL (ref 80–100)
NITRITE, URINE: NEGATIVE
PDW BLD-RTO: 13.9 % (ref 11–15)
PH UA: 6 (ref 5–8)
PLATELET # BLD: 324 10*3/UL (ref 140–400)
PMV BLD AUTO: 8.3 FL (ref 7.5–11.5)
POTASSIUM SERPL-SCNC: 4.4 MMOL/L (ref 3.5–5.1)
PROTEIN UA: NEGATIVE MG/DL
PROTHROMBIN TIME: 11.2 SECONDS (ref 9.4–12.6)
RBC # BLD: 4.48 10*6/UL (ref 3.8–5.1)
SODIUM BLD-SCNC: 140 MMOL/L (ref 135–146)
SPECIFIC GRAVITY UA: 1.02 (ref 1–1.03)
STAPH AUREUS SCREEN: NEGATIVE
STAPH AUREUS.METHICILLIN RESISTANT DNA: NEGATIVE
UROBILINOGEN, URINE: <2 MG/DL
WBC # BLD: 8.8 10*3/UL (ref 3.8–10.8)

## 2017-12-05 PROCEDURE — 99213 OFFICE O/P EST LOW 20 MIN: CPT | Performed by: PHYSICIAN ASSISTANT

## 2017-12-05 PROCEDURE — 4040F PNEUMOC VAC/ADMIN/RCVD: CPT | Performed by: PHYSICIAN ASSISTANT

## 2017-12-05 PROCEDURE — G8427 DOCREV CUR MEDS BY ELIG CLIN: HCPCS | Performed by: PHYSICIAN ASSISTANT

## 2017-12-05 PROCEDURE — G8484 FLU IMMUNIZE NO ADMIN: HCPCS | Performed by: PHYSICIAN ASSISTANT

## 2017-12-05 PROCEDURE — 1123F ACP DISCUSS/DSCN MKR DOCD: CPT | Performed by: PHYSICIAN ASSISTANT

## 2017-12-05 PROCEDURE — 1036F TOBACCO NON-USER: CPT | Performed by: PHYSICIAN ASSISTANT

## 2017-12-05 PROCEDURE — G8400 PT W/DXA NO RESULTS DOC: HCPCS | Performed by: PHYSICIAN ASSISTANT

## 2017-12-05 PROCEDURE — 3017F COLORECTAL CA SCREEN DOC REV: CPT | Performed by: PHYSICIAN ASSISTANT

## 2017-12-05 PROCEDURE — 1090F PRES/ABSN URINE INCON ASSESS: CPT | Performed by: PHYSICIAN ASSISTANT

## 2017-12-05 PROCEDURE — G8417 CALC BMI ABV UP PARAM F/U: HCPCS | Performed by: PHYSICIAN ASSISTANT
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
## 2017-12-06 ENCOUNTER — OFFICE VISIT (OUTPATIENT)
Dept: CARDIOLOGY CLINIC | Age: 77
End: 2017-12-06

## 2017-12-06 VITALS
HEART RATE: 72 BPM | BODY MASS INDEX: 35.53 KG/M2 | SYSTOLIC BLOOD PRESSURE: 136 MMHG | WEIGHT: 181 LBS | DIASTOLIC BLOOD PRESSURE: 72 MMHG | HEIGHT: 60 IN

## 2017-12-06 DIAGNOSIS — Z01.810 PRE-OPERATIVE CARDIOVASCULAR EXAMINATION: ICD-10-CM

## 2017-12-06 DIAGNOSIS — R01.1 MURMUR: ICD-10-CM

## 2017-12-06 DIAGNOSIS — Z01.818 PRE-OP TESTING: Primary | ICD-10-CM

## 2017-12-06 LAB
ABO GROUPING: NORMAL
ANTIBODY SCREEN: NEGATIVE
BACTERIA IDENTIFIED: NORMAL
RH FACTOR: NEGATIVE

## 2017-12-06 PROCEDURE — 99203 OFFICE O/P NEW LOW 30 MIN: CPT | Performed by: INTERNAL MEDICINE

## 2017-12-06 PROCEDURE — 4040F PNEUMOC VAC/ADMIN/RCVD: CPT | Performed by: INTERNAL MEDICINE

## 2017-12-06 PROCEDURE — 93000 ELECTROCARDIOGRAM COMPLETE: CPT | Performed by: INTERNAL MEDICINE

## 2017-12-06 PROCEDURE — 1090F PRES/ABSN URINE INCON ASSESS: CPT | Performed by: INTERNAL MEDICINE

## 2017-12-06 PROCEDURE — G8484 FLU IMMUNIZE NO ADMIN: HCPCS | Performed by: INTERNAL MEDICINE

## 2017-12-06 PROCEDURE — G8417 CALC BMI ABV UP PARAM F/U: HCPCS | Performed by: INTERNAL MEDICINE

## 2017-12-06 PROCEDURE — G8427 DOCREV CUR MEDS BY ELIG CLIN: HCPCS | Performed by: INTERNAL MEDICINE

## 2017-12-06 PROCEDURE — 3017F COLORECTAL CA SCREEN DOC REV: CPT | Performed by: INTERNAL MEDICINE

## 2017-12-06 NOTE — LETTER
[address] [place] [address] - [address] [place] [address] [address] [address]  Phone: [phone]  Fax: [phone]    Beena De Jesus MD        December 12, 2017     Rodo Hudson, [facility]    Patient: Willem Armstrong  MR Number: [id number]  YOB: 1940  Date of Visit: 12/6/2017    Dear Dr. Rodo Hudson: Thank you for the request for consultation for [facility] [place], operated by [facility] to me for the evaluation of pre op exam. Below are the relevant portions of my assessment and plan of care. History of present illness on initial date of evaluation:              Willem Armstrong is a 78 y.o. patient who presents for pre-operative risk assessment for right shoulder surgery. She reports she had rheumatic fever when she was in the 1st grade. She was told throughout her life that she had a heart murmur that she has had no issue with. She reports she feels well from a cardiac standpoint. She does not feel she has any limitations from a cardiac standpoint. She is able to climb stairs, walk long distances and be as active as she wants without any difficulty. She does complain of occasional shortness of breath which she attributes to a history of smoking. She denies palpitations. Assessment:  78 y.o. patient with:  1. Pre-operative risk assessment              ~right shoulder surgery  2. Systolic ejection murmur               ~Rheumatic fever in 1st grade      Plan:  1. Okay to proceed with surgery as scheduled. 2. Echocardiogram to evaluate heart function and structure and to further evaluate your murmur. 3. The patient was seen for >25 minutes. >50% of the time was devoted to giving the patient detailed instructions instructions on addressing diet, regular exercise, weight control, smoking abstention, medication compliance, and stress minimization. The patient was provided written and verbal instructions regarding risk factor modification.   Water aerobics, walking, biking. 4. Follow up with me in 3 months. If you have questions, please do not hesitate to call me. I look forward to following [facility] along with you.     Sincerely,        Beena De Jesus MD

## 2017-12-06 NOTE — LETTER
[address] Cardiology - [address] [address]  Phone: [phone]  Fax: [phone]    Eliu Ovalle MD        December 6, 2017    [facility] 78 y.o. female 1940 is at intermediate cardiac risk for non-cardiac surgery. She may proceed without further testing. If you have any questions or concerns, please don't hesitate to call.     Sincerely,        Eliu Ovalle MD

## 2017-12-06 NOTE — PROGRESS NOTES
degeneration; and Rheumatic fever. Surgical History:   has a past surgical history that includes Tonsillectomy and adenoidectomy (child); Appendectomy; Hysterectomy (age 41); Cervical disc surgery (4/08); joint replacement (2/06); joint replacement (7/05); joint replacement (2/10); Spine surgery (1/10); Cataract removal with implant (8/10); Cataract removal with implant (9/10); Facial cosmetic surgery (11/19/2010); other surgical history (2/12); Abdomen surgery (1967); Shoulder arthroscopy (9/5/12); joint replacement (Right, 12/10/13); other surgical history (Right, 1/23/14 & 2/10/14); Colonoscopy (03/14/2017); and shoulder surgery (Left, 07/2017). Social History:   reports that she quit smoking about 7 months ago. Her smoking use included Cigarettes. She has a 50.00 pack-year smoking history. She has never used smokeless tobacco. She reports that she does not drink alcohol or use drugs. Family History:  No evidence for sudden cardiac death or premature CAD    Medications:  Reviewed and are listed in nursing record. and/or listed below  Outpatient Medications:  Prior to Admission medications    Medication Sig Start Date End Date Taking?  Authorizing Provider   diclofenac (VOLTAREN) 75 MG EC tablet TAKE ONE TABLET BY MOUTH TWICE A DAY 11/16/17  Yes Aramis Faust MD   varenicline (CHANTIX CONTINUING MONTH PAK) 1 MG tablet TAKE ONE TABLET BY MOUTH TWICE A DAY 10/20/17  Yes Rodriguez Cobb MD   losartan-hydrochlorothiazide [facility]) 100-12.5 MG per tablet TAKE ONE TABLET BY MOUTH DAILY 8/16/17  Yes Rodriguez Cobb MD   escitalopram (LEXAPRO) 20 MG tablet TAKE ONE TABLET BY MOUTH DAILY 8/16/17  Yes Rodriguez Cobb MD   fenofibrate (TRICOR) 145 MG tablet TAKE ONE TABLET BY MOUTH DAILY 8/16/17  Yes Rodriguez Cobb MD   simvastatin (ZOCOR) 20 MG tablet TAKE ONE TABLET BY MOUTH ONCE NIGHTLY 8/16/17  Yes Rodriguez Cobb MD   varenicline (CHANTIX STARTING MONTH PAK) 0.5 MG X 11 & 1 MG X 42 tablet Take by mouth according to package directions. 4/24/17  Yes Adam Vo MD   valACYclovir [facility]) 1 G tablet Take 1 tablet by mouth 3 times daily 2/17/17  Yes Adam Vo MD   dicyclomine (BENTYL) 10 MG capsule Take 1 capsule by mouth 4 times daily as needed (cramping) 2/17/17  Yes Adam Vo MD   meclizine (ANTIVERT) 25 MG tablet TAKE ONE TABLET BY MOUTH THREE TIMES A DAY AS NEEDED FOR DIZZINESS 2/3/17  Yes Adam Vo MD   HYDROcodone-acetaminophen [facility]) 5-325 MG per tablet Take 1 tablet by mouth daily MAY FILL 10/8/16 8/12/16  Yes Juan Diaz PA-C   Fish Oil OIL by Does not apply route. Yes Historical Provider, MD   EPINEPHrine (EPIPEN 2-ESTEFANÍA) 0.3 MG/0.3ML SERG injection Use as directed for allergic reaction 2/12/14  Yes Adam Vo MD   vitamin E 400 UNIT capsule Take 400 Units by mouth daily. Yes Historical Provider, MD   diphenhydrAMINE (BENADRYL) 25 MG tablet Take 50 mg by mouth daily. Yes Historical Provider, MD   budesonide (RINOCORT AQUA) 32 MCG/ACT nasal spray 1 spray by Nasal route daily. 2/17/12  Yes Adam Vo MD       In-patient schedule medications:        Infusion Medications: Allergies:  Diovan [valsartan]; Bee venom; Formaldehyde; Iodine; Lisinopril; Niaspan [niacin er]; and Amoxicillin     Review of Systems:   All 14 point review of symptoms completed. Pertinent positives identified in the HPI, all other review of symptoms findings as below.      Review of Systems - History obtained from the patient  General ROS: negative for - chills, fever or night sweats  Psychological ROS: negative for - disorientation or hallucinations  Ophthalmic ROS: negative for - dry eyes, eye pain or loss of vision  ENT ROS: negative for - nasal discharge or sore throat  Allergy and Immunology ROS: negative for - hives or itchy/watery eyes  Hematological and Lymphatic ROS: negative for - jaundice or night sweats  Endocrine ROS: negative for - mood swings or temperature Labs  Recent Labs      12/05/17 2009   WBC  8.8   HGB  12.8   HCT  37.4   MCV  83.4   PLT  324     Recent Labs      12/05/17 2005   CREATININE  0.71   BUN  22   NA  140   K  4.4   CL  101   CO2  29     Recent Labs      12/05/17 2005   INR  1.0   PROTIME  11.2     No results for input(s): TROPONINI in the last 72 hours. Invalid input(s): PRO-BNP  No results for input(s): CHOL, HDL in the last 72 hours. Invalid input(s): LDL, TG      Imaging:  I have reviewed the below testing personally and my interpretation is below. EKG:  CXR:      Assessment:  78 y.o. patient with:  1. Pre-operative risk assessment   ~right shoulder surgery  2. Systolic ejection murmur    ~Rheumatic fever in 1st grade     Plan:  1. Okay to proceed with surgery as scheduled. 2. Echocardiogram to evaluate heart function and structure and to further evaluate your murmur. 3. The patient was seen for >25 minutes. >50% of the time was devoted to giving the patient detailed instructions instructions on addressing diet, regular exercise, weight control, smoking abstention, medication compliance, and stress minimization. The patient was provided written and verbal instructions regarding risk factor modification. Water aerobics, walking, biking. 4. Follow up with me in 3 months. All questions and concerns were addressed to the patient/family. Alternatives to my treatment were discussed. The note was completed using EMR. Every effort was made to ensure accuracy; however, inadvertent computerized transcription errors may be present.     Rahul Lawler MD, [address], [place], [place]  [phone] [facility] office  [phone] [facility] central  12/6/2017  2:36 PM

## 2017-12-08 ENCOUNTER — TELEPHONE (OUTPATIENT)
Dept: CARDIOLOGY CLINIC | Age: 77
End: 2017-12-08

## 2017-12-08 NOTE — LETTER
[address] [place] [address] Cardiology - [address] [place] [address]  Phone: [phone]  Fax: [phone]    Crescencio Ly MD           Re: Edilson Madison ([birth year])     December 8, 2017      To whom it may concern,     The patient's cardiac condition is not prohibitory to undergo surgery. The patient's cardiac risk is low based upon my evaluation and testing. Stable to proceed. Does not need echo before surgery. Please call our office if you have any questions.      Sincerely,        Crescencio Ly MD

## 2017-12-08 NOTE — TELEPHONE ENCOUNTER
12/6/2017 visit with [facility]  Plan:  1. Okay to proceed with surgery as scheduled. 2. Echocardiogram to evaluate heart function and structure and to further evaluate your murmur. 3. The patient was seen for >25 minutes. >50% of the time was devoted to giving the patient detailed instructions instructions on addressing diet, regular exercise, weight control, smoking abstention, medication compliance, and stress minimization. The patient was provided written and verbal instructions regarding risk factor modification. Water aerobics, walking, biking. 4. Follow up with me in 3 months.

## 2017-12-08 NOTE — TELEPHONE ENCOUNTER
The patient's cardiac condition is not prohibitory to undergo surgery. The patient's cardiac risk is low based upon my evaluation and testing. Stable to proceed.      Does not need echo before surgery

## 2017-12-08 NOTE — TELEPHONE ENCOUNTER
[facility] calling to make sure pt is cleared for surgery and that she does not have to have echo prior, Echo is scheduled for 1/11. Surgery is Monday. Please call to clarify. They need an answer by this afternoon in order to proceed w/surgery.

## 2017-12-12 NOTE — COMMUNICATION BODY
History of present illness on initial date of evaluation:              Karen Hoyos is a 78 y.o. patient who presents for pre-operative risk assessment for right shoulder surgery. She reports she had rheumatic fever when she was in the 1st grade. She was told throughout her life that she had a heart murmur that she has had no issue with. She reports she feels well from a cardiac standpoint. She does not feel she has any limitations from a cardiac standpoint. She is able to climb stairs, walk long distances and be as active as she wants without any difficulty. She does complain of occasional shortness of breath which she attributes to a history of smoking. She denies palpitations. Assessment:  78 y.o. patient with:  1. Pre-operative risk assessment              ~right shoulder surgery  2. Systolic ejection murmur               ~Rheumatic fever in 1st grade      Plan:  1. Okay to proceed with surgery as scheduled. 2. Echocardiogram to evaluate heart function and structure and to further evaluate your murmur. 3. The patient was seen for >25 minutes. >50% of the time was devoted to giving the patient detailed instructions instructions on addressing diet, regular exercise, weight control, smoking abstention, medication compliance, and stress minimization. The patient was provided written and verbal instructions regarding risk factor modification. Water aerobics, walking, biking. 4. Follow up with me in 3 months.

## 2017-12-18 ENCOUNTER — TELEPHONE (OUTPATIENT)
Dept: FAMILY MEDICINE CLINIC | Age: 77
End: 2017-12-18

## 2017-12-18 NOTE — TELEPHONE ENCOUNTER
When advised that the surgeon should have someone on-call to cover and she said that she thought so too, she is also having a lot of trouble breathing and severe shortness of breath so I offered her an appointment but she declined. I did advise she needs to be seen especially because of the SOB but she will have to call back which she promises to do.

## 2017-12-18 NOTE — TELEPHONE ENCOUNTER
Patient was advised but refuses the ER, she finally agreed to make an appointment but is after yousuf, she refuses anything else. Did advise her to please consider the ER especially if her breathing worsens.

## 2017-12-22 ENCOUNTER — HOSPITAL ENCOUNTER (OUTPATIENT)
Dept: PHYSICAL THERAPY | Age: 77
Discharge: HOME | End: 2017-12-31
Admitting: ORTHOPAEDIC SURGERY

## 2017-12-22 ENCOUNTER — OFFICE VISIT (OUTPATIENT)
Dept: ORTHOPEDIC SURGERY | Age: 77
End: 2017-12-22

## 2017-12-22 DIAGNOSIS — Z96.611 STATUS POST TOTAL REPLACEMENT OF RIGHT SHOULDER: Primary | ICD-10-CM

## 2017-12-22 PROCEDURE — 99024 POSTOP FOLLOW-UP VISIT: CPT | Performed by: ORTHOPAEDIC SURGERY

## 2017-12-22 NOTE — PLAN OF CARE
[facility], [address] [place] [address]  Phone: [phone]  Fax [phone]     Physical Therapy Certification    Dear Referring Practitioner: Dr. Eloina León,    We had the pleasure of evaluating the following patient for physical therapy services at [address] [place] [address]. A summary of our findings can be found in the initial assessment below. This includes our plan of care. If you have any questions or concerns regarding these findings, please do not hesitate to contact me at the office phone number checked above. Thank you for the referral.       Physician Signature:_______________________________Date:__________________  By signing above (or electronic signature), therapists plan is approved by physician    Patient: Yisel Florence   : 1940   MRN: [id number]  Referring Physician: Referring Practitioner: Dr. 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 Relevant Medical History:all medical conditions are under control. 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   Has had left shoulder replacement, both knees and both hips replaced  Functional Disability Index: 75%    Pain Scale: 2-9/10  Easing factors: pain pills  Provocative factors: using your right arm     Type: []Constant   [x]Intermittent  []Radiating []Localized []other:     Numbness/Tingling: []Congenital spine pathologies   []Prior surgical intervention  []Osteoporosis (M81.8)  []Osteopenia (M85.8)   Endocrine conditions   []Hypothyroid (E03.9)  []Hyperthyroid Gastrointestinal conditions   []Constipation (H38.52)   Metabolic conditions   []Morbid obesity (E66.01)  []Diabetes type 1(E10.65) or 2 (E11.65)   []Neuropathy (G60.9)     Pulmonary conditions   []Asthma (J45)  []Coughing   []COPD (J44.9)   Psychological Disorders  []Anxiety (F41.9)  []Depression (F32.9)   []Other:   []Other:          Barriers to/and or personal factors that will affect rehab potential:              []Age  []Sex              []Motivation/Lack of Motivation                        []Co-Morbidities              []Cognitive Function, education/learning barriers              []Environmental, home barriers              []profession/work barriers  []past PT/medical experience  []other:  Justification:      Falls Risk Assessment (30 days):  [x] Falls Risk assessed and no intervention required.   [] Falls Risk assessed and Patient requires intervention due to being higher risk   TUG score (>12s at risk):     [] Falls education provided, including       G-Codes:       ASSESSMENT:   Functional Impairments   []Noted spinal or UE joint hypomobility   []Noted spinal or UE joint hypermobility   [x]Decreased UE functional ROM   [x]Decreased UE functional strength   []Abnormal reflexes/sensation/myotomal/dermatomal deficits   []Decreased RC/scapular/core strength and neuromuscular control   []other:      Functional Activity Limitations (from functional questionnaire and intake)   []Reduced ability to tolerate prolonged functional positions   []Reduced ability or difficulty with changes of positions or transfers between positions   [x]Reduced ability to maintain good posture and demonstrate good body mechanics with sitting, bending, and lifting   [] Reduced ability or tolerance with driving and/or computer work   [x]Reduced ability to functioning as indicated by patients Functional Deficits. 3. Patient will demonstrate an increase in Strength to 4-/5 to allow for proper functional mobility as indicated by patients Functional Deficits. 4. Patient will return to ADLs functional activities without increased symptoms or restriction.           Electronically signed by:  Deniz Gonzalez PT

## 2017-12-22 NOTE — FLOWSHEET NOTE
NMR re-education                                                 Therapeutic Exercise and NMR EXR  [x] (09223) Provided verbal/tactile cueing for activities related to strengthening, flexibility, endurance, ROM  for improvements in scapular, scapulothoracic and UE control with self care, reaching, carrying, lifting, house/yardwork, driving/computer work.    [] (04941) Provided verbal/tactile cueing for activities related to improving balance, coordination, kinesthetic sense, posture, motor skill, proprioception  to assist with  scapular, scapulothoracic and UE control with self care, reaching, carrying, lifting, house/yardwork, driving/computer work. Therapeutic Activities:    [] (93219 or 99590) Provided verbal/tactile cueing for activities related to improving balance, coordination, kinesthetic sense, posture, motor skill, proprioception and motor activation to allow for proper function of scapular, scapulothoracic and UE control with self care, carrying, lifting, driving/computer work.      Home Exercise Program:    [x] (49041) Reviewed/Progressed HEP activities related to strengthening, flexibility, endurance, ROM of scapular, scapulothoracic and UE control with self care, reaching, carrying, lifting, house/yardwork, driving/computer work  [] (75869) Reviewed/Progressed HEP activities related to improving balance, coordination, kinesthetic sense, posture, motor skill, proprioception of scapular, scapulothoracic and UE control with self care, reaching, carrying, lifting, house/yardwork, driving/computer work      Manual Treatments:  PROM / STM / Oscillations-Mobs:  G-I, II, III, IV (PA's, Inf., Post.)  [x] (18217) Provided manual therapy to mobilize soft tissue/joints of cervical/CT, scapular GHJ and UE for the purpose of modulating pain, promoting relaxation,  increasing ROM, reducing/eliminating soft tissue swelling/inflammation/restriction, improving soft tissue extensibility and allowing for proper ROM

## 2017-12-22 NOTE — PROGRESS NOTES
HISTORY OF PRESENT ILLNESS: The patient returns today for the first postoperative visit after right total shoulder arthroplasty. Pain control has been satisfactory with oral medications. There have been no fevers or chills. Home exercises have been performed as instructed. She did notice a small amount of possible around the puncture site from the catheter for the indwelling, interscalene block. PHYSICAL EXAMINATION: Inspection of the affected right shoulder reveals expected swelling. The incision is clean and dry. There is some contact dermatitis related to the dressing. The skin is warm. Range of motion is limited by pain and swelling as expected. The deltoid contracts. The distal neurovascular exam is grossly intact. There is small amount of resolving erythema about the neck at the catheter site. X-RAYS:  True AP and axillary views of the right shoulder reveal appropriately placed, well located total shoulder arthroplasty components. The lesser tuberosity is appropriately positioned    ASSESSMENT/PLAN: Doing well after total shoulder arthroplasty. The UltraSling will be minimized over the next few days. I reviewed the standard postoperative exercises and provided them with a prescription for physical therapy. I have recommended ice and judicious use of narcotics as required. They will follow up in approximately six weeks for repeat evaluation. At that time I will repeat true AP and axillary views of the shoulder.

## 2018-01-01 ENCOUNTER — HOSPITAL ENCOUNTER (OUTPATIENT)
Dept: PHYSICAL THERAPY | Age: 78
Discharge: HOME | End: 2018-01-31
Attending: ORTHOPAEDIC SURGERY | Admitting: ORTHOPAEDIC SURGERY

## 2018-01-02 ENCOUNTER — HOSPITAL ENCOUNTER (OUTPATIENT)
Dept: PHYSICAL THERAPY | Age: 78
Discharge: HOME | End: 2018-01-02
Admitting: ORTHOPAEDIC SURGERY

## 2018-01-02 DIAGNOSIS — T81.30XA WOUND DEHISCENCE: Primary | ICD-10-CM

## 2018-01-02 RX ORDER — CEPHALEXIN 500 MG/1
500 CAPSULE ORAL 3 TIMES DAILY
Qty: 21 CAPSULE | Refills: 0 | Status: SHIPPED | OUTPATIENT
Start: 2018-01-02 | End: 2018-01-09

## 2018-01-02 NOTE — FLOWSHEET NOTE
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 Jonna Pretty of care signed (Y/N):     Date of Patient follow up with Physician: Carter Downey    G-Code (if applicable):      Date G-Code Applied:         Progress Note: []  Yes  []  No  Next due by: Visit #10      Latex Allergy:  [x]NO      []YES  Preferred Language for Healthcare:   [x]English       []other:    Visit # Insurance Allowable Requires auth   2 MC    [x]no        []yes:     Pain level:  NT/10     SUBJECTIVE:  Pt reports she gets dizzy when she looks up or lies down. Seeing PCP tomorrow about this. Shoulder is feeling good. Did note incision opened up a bit at the top and irritated area at mid incision. OBJECTIVE:   Observation: open area prox incision without drainage, mild drainage mid incision as consistent with stitch below the surface. MA notified and pt seen by PA and wound packed with care instructions given for home. Will monitor status.   Test measurements:      RESTRICTIONS/PRECAUTIONS: standard TSR precautions  2 weeks 17 4 weeks 18 6 weeks 18    Exercises/Interventions:   Therapeutic Ex Sets/rep comments   shrugs/pinches hep   Self ROM ER to neutral hep   Table slides flexion hep   Wall iso's flex, abd, ext, ER 5\" x10 ea No IR                                       Manual Intervention     Ossilations/ PROM as

## 2018-01-03 ENCOUNTER — OFFICE VISIT (OUTPATIENT)
Dept: FAMILY MEDICINE CLINIC | Age: 78
End: 2018-01-03

## 2018-01-03 VITALS
HEIGHT: 60 IN | WEIGHT: 173.2 LBS | DIASTOLIC BLOOD PRESSURE: 62 MMHG | BODY MASS INDEX: 34 KG/M2 | OXYGEN SATURATION: 95 % | HEART RATE: 81 BPM | SYSTOLIC BLOOD PRESSURE: 126 MMHG

## 2018-01-03 DIAGNOSIS — E78.1 HYPERTRIGLYCERIDEMIA: Chronic | ICD-10-CM

## 2018-01-03 DIAGNOSIS — R06.02 SHORTNESS OF BREATH: Primary | ICD-10-CM

## 2018-01-03 DIAGNOSIS — F17.200 SMOKER: Chronic | ICD-10-CM

## 2018-01-03 DIAGNOSIS — E78.6 HDL LIPOPROTEIN DEFICIENCY: Chronic | ICD-10-CM

## 2018-01-03 DIAGNOSIS — I10 ESSENTIAL HYPERTENSION: Chronic | ICD-10-CM

## 2018-01-03 LAB
CHOLESTEROL, TOTAL: 203 MG/DL (ref 0–199)
HDLC SERPL-MCNC: 35 MG/DL (ref 40–60)
LDL CHOLESTEROL CALCULATED: ABNORMAL MG/DL
LDL CHOLESTEROL DIRECT: 123 MG/DL
TRIGL SERPL-MCNC: 302 MG/DL (ref 0–150)
VLDLC SERPL CALC-MCNC: ABNORMAL MG/DL

## 2018-01-03 PROCEDURE — G8400 PT W/DXA NO RESULTS DOC: HCPCS | Performed by: FAMILY MEDICINE

## 2018-01-03 PROCEDURE — G8484 FLU IMMUNIZE NO ADMIN: HCPCS | Performed by: FAMILY MEDICINE

## 2018-01-03 PROCEDURE — 36415 COLL VENOUS BLD VENIPUNCTURE: CPT | Performed by: FAMILY MEDICINE

## 2018-01-03 PROCEDURE — 99214 OFFICE O/P EST MOD 30 MIN: CPT | Performed by: FAMILY MEDICINE

## 2018-01-03 PROCEDURE — 1123F ACP DISCUSS/DSCN MKR DOCD: CPT | Performed by: FAMILY MEDICINE

## 2018-01-03 PROCEDURE — G8427 DOCREV CUR MEDS BY ELIG CLIN: HCPCS | Performed by: FAMILY MEDICINE

## 2018-01-03 PROCEDURE — 1090F PRES/ABSN URINE INCON ASSESS: CPT | Performed by: FAMILY MEDICINE

## 2018-01-03 PROCEDURE — 3017F COLORECTAL CA SCREEN DOC REV: CPT | Performed by: FAMILY MEDICINE

## 2018-01-03 PROCEDURE — 4040F PNEUMOC VAC/ADMIN/RCVD: CPT | Performed by: FAMILY MEDICINE

## 2018-01-03 PROCEDURE — G8417 CALC BMI ABV UP PARAM F/U: HCPCS | Performed by: FAMILY MEDICINE

## 2018-01-03 PROCEDURE — 1036F TOBACCO NON-USER: CPT | Performed by: FAMILY MEDICINE

## 2018-01-03 NOTE — PROGRESS NOTES
Subjective:      Patient ID: Yuri Titus is a 78 y.o. female. HPI  On December 11, the day of her shoulder replacement, Ms. Quintana experienced sudden onset of shortness of breath. This was prior to her shoulder replacement. The shortness of breath continued throughout her hospitalization. She states no one was particularly concerned about this and no intervention took place. She continues to be significantly more short of breath with any activity. She also has had lightheadedness which began after her surgery but also while she was in the hospital. She denies vertigo. She has had vertigo in the past and, in fact, had leftover meclizine at home which provided her no relief from the lightheadedness. She denies any falls. She says she stop smoking May 1, 2017, but does still have the occasional cigarette. She says she will smoke a pack of cigarettes in a month    Review of Systems  All other systems were reviewed and are negative      Objective:   Physical Exam   Constitutional: She is oriented to person, place, and time. She appears well-developed and well-nourished. No distress. Color good  No apparent respiratory distress   HENT:   Head: Normocephalic and atraumatic. Eyes: Conjunctivae are normal. Pupils are equal, round, and reactive to light. Neck: Normal range of motion. Neck supple. Cardiovascular: Normal rate, regular rhythm and normal heart sounds. Pulmonary/Chest: Effort normal and breath sounds normal. No respiratory distress. She has no wheezes. She has no rales. Lymphadenopathy:     She has no cervical adenopathy. Neurological: She is alert and oriented to person, place, and time. Skin: Skin is warm and dry. Psychiatric: She has a normal mood and affect. Her behavior is normal. Judgment and thought content normal.   Nursing note and vitals reviewed.       Assessment:      Shortness of breath in a smoker      Plan:      Given how this began and the duration I have a low level of

## 2018-01-04 ENCOUNTER — HOSPITAL ENCOUNTER (OUTPATIENT)
Dept: GENERAL RADIOLOGY | Age: 78
Discharge: HOME | End: 2018-01-04

## 2018-01-04 ENCOUNTER — HOSPITAL ENCOUNTER (OUTPATIENT)
Dept: OTHER | Age: 78
Discharge: HOME | End: 2018-01-04
Attending: FAMILY MEDICINE | Admitting: FAMILY MEDICINE

## 2018-01-04 DIAGNOSIS — R06.02 SHORTNESS OF BREATH: ICD-10-CM

## 2018-01-04 DIAGNOSIS — F17.200 SMOKER: Chronic | ICD-10-CM

## 2018-01-04 LAB
LV EF: 63 %
LVEF MODALITY: NORMAL

## 2018-01-05 ENCOUNTER — TELEPHONE (OUTPATIENT)
Dept: CARDIOLOGY CLINIC | Age: 78
End: 2018-01-05

## 2018-01-05 NOTE — TELEPHONE ENCOUNTER
Created telephone encounter. Per Pt HIPAA from can leave results on machine. LMOM relaying message per [facility] regarding echo results. Pt to call the office with any concerns.

## 2018-01-05 NOTE — TELEPHONE ENCOUNTER
----- Message from Yamila Esposito MD sent at 1/5/2018  8:41 AM EST -----  There are minor findings that are not clinically relevant at this time. The overall findings suggest normal results for age. Please call patient with results.  She should see me in 1 year to scheduled another echo

## 2018-01-09 ENCOUNTER — HOSPITAL ENCOUNTER (OUTPATIENT)
Dept: PHYSICAL THERAPY | Age: 78
Discharge: HOME | End: 2018-01-09
Admitting: ORTHOPAEDIC SURGERY

## 2018-01-10 ENCOUNTER — TELEPHONE (OUTPATIENT)
Dept: CARDIOLOGY CLINIC | Age: 78
End: 2018-01-10

## 2018-01-10 ENCOUNTER — HOSPITAL ENCOUNTER (OUTPATIENT)
Dept: PULMONOLOGY | Age: 78
Discharge: HOME | End: 2018-01-10

## 2018-01-10 VITALS — OXYGEN SATURATION: 96 %

## 2018-01-10 DIAGNOSIS — R06.02 SHORTNESS OF BREATH: ICD-10-CM

## 2018-01-10 RX ORDER — ALBUTEROL SULFATE 90 UG/1
4 AEROSOL, METERED RESPIRATORY (INHALATION) ONCE
Status: COMPLETED | OUTPATIENT
Start: 2018-01-10 | End: 2018-01-10

## 2018-01-10 RX ADMIN — ALBUTEROL SULFATE 4 PUFF: 90 AEROSOL, METERED RESPIRATORY (INHALATION) at 13:30

## 2018-01-10 NOTE — TELEPHONE ENCOUNTER
1/10/18 Called pt to let her know that she does not need the echo that was scheduled on 1/11/18 @1:30 because she had an echo on 1/4/18 and does not need another for one year.  Left this on her vm

## 2018-01-11 ENCOUNTER — HOSPITAL ENCOUNTER (OUTPATIENT)
Dept: CARDIOLOGY | Age: 78
Discharge: HOME | End: 2018-01-04

## 2018-01-23 ENCOUNTER — HOSPITAL ENCOUNTER (OUTPATIENT)
Dept: PHYSICAL THERAPY | Age: 78
Discharge: HOME | End: 2018-01-23
Admitting: ORTHOPAEDIC SURGERY

## 2018-01-23 NOTE — FLOWSHEET NOTE
to HEP        Pt ed: icing, activity mod, surgery precautions, wound care, HEP x4'    Manual Intervention     Ossilations/ PROM as allowed by protocol, wound check 20 min                                  NMR re-education                                                 Therapeutic Exercise and NMR EXR  [x] (07519) Provided verbal/tactile cueing for activities related to strengthening, flexibility, endurance, ROM  for improvements in scapular, scapulothoracic and UE control with self care, reaching, carrying, lifting, house/yardwork, driving/computer work.    [] (56305) Provided verbal/tactile cueing for activities related to improving balance, coordination, kinesthetic sense, posture, motor skill, proprioception  to assist with  scapular, scapulothoracic and UE control with self care, reaching, carrying, lifting, house/yardwork, driving/computer work. Therapeutic Activities:    [] (71450 or 89688) Provided verbal/tactile cueing for activities related to improving balance, coordination, kinesthetic sense, posture, motor skill, proprioception and motor activation to allow for proper function of scapular, scapulothoracic and UE control with self care, carrying, lifting, driving/computer work.      Home Exercise Program:    [x] (70061) Reviewed/Progressed HEP activities related to strengthening, flexibility, endurance, ROM of scapular, scapulothoracic and UE control with self care, reaching, carrying, lifting, house/yardwork, driving/computer work  [] (23462) Reviewed/Progressed HEP activities related to improving balance, coordination, kinesthetic sense, posture, motor skill, proprioception of scapular, scapulothoracic and UE control with self care, reaching, carrying, lifting, house/yardwork, driving/computer work      Manual Treatments:  PROM / STM / Oscillations-Mobs:  G-I, II, III, IV (PA's, Inf., Post.)  [x] (50267) Provided manual therapy to mobilize soft tissue/joints of cervical/CT, scapular GHJ and UE for the Anant, PT

## 2018-01-30 ENCOUNTER — HOSPITAL ENCOUNTER (OUTPATIENT)
Dept: PHYSICAL THERAPY | Age: 78
Discharge: HOME | End: 2018-01-30
Admitting: ORTHOPAEDIC SURGERY

## 2018-01-30 ENCOUNTER — OFFICE VISIT (OUTPATIENT)
Dept: ORTHOPEDIC SURGERY | Age: 78
End: 2018-01-30

## 2018-01-30 VITALS — WEIGHT: 173.28 LBS | BODY MASS INDEX: 34.02 KG/M2 | HEIGHT: 60 IN

## 2018-01-30 DIAGNOSIS — Z96.611 STATUS POST TOTAL REPLACEMENT OF RIGHT SHOULDER: ICD-10-CM

## 2018-01-30 PROCEDURE — 99024 POSTOP FOLLOW-UP VISIT: CPT | Performed by: ORTHOPAEDIC SURGERY

## 2018-01-30 NOTE — FLOWSHEET NOTE
promoting relaxation,  increasing ROM, reducing/eliminating soft tissue swelling/inflammation/restriction, improving soft tissue extensibility and allowing for proper ROM for normal function with self care, reaching, carrying, lifting, house/yardwork, driving/computer work    Modalities:  CP x 15 min to right shoulder       Charges:  Timed Code Treatment Minutes: 40   Total Treatment Minutes: 55     [] EVAL (LOW) 07526 (typically 20 minutes face-to-face)  [] EVAL (MOD) 87808 (typically 30 minutes face-to-face)  [] EVAL (HIGH) 80275 (typically 45 minutes face-to-face)  [] RE-EVAL     [x] WZ(31563) x  2   [] IONTO  [] NMR (86076) x      [] VASO  [x] Manual (74525) x  1    [] Other:  [] TA x       [] Mech Traction (19148)  [] ES(attended) (47973)      [] ES (un) (50094): MEDICARE CAP EXCEPTION DOCUMENTATION      I certify that this patient meets one of the below criteria necessary for becoming an exception to the Medicare cap on therapy services:    [x]  The patient has a condition identified by an ICD-9 code that has a direct and significant impact on the need for therapy. (Significantly impacts the rate of recovery. ) OA resulting in need for surgery                  []  The patient has a complexity identified by an ICD-9 code that has a direct and significant impact on the need for therapy. (Significantly impacts the rate of recovery and is associated with a primary condition.)         []  The patient has associated variables that influence the amount of treatment to include:  Social support, self-efficacy/motivation, prognosis, time since onset/acuity. []  The patient has generalized musculoskeletal conditions or a condition affecting multiple sites that will have a direct impact on the rate of recovery. [x]  The patient had a prior episode of outpatient therapy during this calendar year for a different condition.            []  The patient has a mental or cognitive disorder in addition to the

## 2018-02-01 ENCOUNTER — HOSPITAL ENCOUNTER (OUTPATIENT)
Dept: PHYSICAL THERAPY | Age: 78
Discharge: HOME | End: 2018-02-28
Attending: ORTHOPAEDIC SURGERY | Admitting: ORTHOPAEDIC SURGERY

## 2018-02-28 ENCOUNTER — OFFICE VISIT (OUTPATIENT)
Dept: ORTHOPEDIC SURGERY | Age: 78
End: 2018-02-28

## 2018-02-28 VITALS
WEIGHT: 173.28 LBS | DIASTOLIC BLOOD PRESSURE: 76 MMHG | HEART RATE: 86 BPM | SYSTOLIC BLOOD PRESSURE: 156 MMHG | BODY MASS INDEX: 34.02 KG/M2 | HEIGHT: 60 IN

## 2018-02-28 DIAGNOSIS — M54.16 LUMBAR RADICULITIS: ICD-10-CM

## 2018-02-28 DIAGNOSIS — M51.36 DDD (DEGENERATIVE DISC DISEASE), LUMBAR: ICD-10-CM

## 2018-02-28 DIAGNOSIS — G89.4 CHRONIC PAIN SYNDROME: Primary | ICD-10-CM

## 2018-02-28 PROCEDURE — G8427 DOCREV CUR MEDS BY ELIG CLIN: HCPCS | Performed by: PHYSICIAN ASSISTANT

## 2018-02-28 PROCEDURE — G8484 FLU IMMUNIZE NO ADMIN: HCPCS | Performed by: PHYSICIAN ASSISTANT

## 2018-02-28 PROCEDURE — G8417 CALC BMI ABV UP PARAM F/U: HCPCS | Performed by: PHYSICIAN ASSISTANT

## 2018-02-28 PROCEDURE — G8400 PT W/DXA NO RESULTS DOC: HCPCS | Performed by: PHYSICIAN ASSISTANT

## 2018-02-28 PROCEDURE — 1090F PRES/ABSN URINE INCON ASSESS: CPT | Performed by: PHYSICIAN ASSISTANT

## 2018-02-28 PROCEDURE — 1036F TOBACCO NON-USER: CPT | Performed by: PHYSICIAN ASSISTANT

## 2018-02-28 PROCEDURE — 4040F PNEUMOC VAC/ADMIN/RCVD: CPT | Performed by: PHYSICIAN ASSISTANT

## 2018-02-28 PROCEDURE — 99214 OFFICE O/P EST MOD 30 MIN: CPT | Performed by: PHYSICIAN ASSISTANT

## 2018-02-28 PROCEDURE — 3017F COLORECTAL CA SCREEN DOC REV: CPT | Performed by: PHYSICIAN ASSISTANT

## 2018-02-28 PROCEDURE — 1123F ACP DISCUSS/DSCN MKR DOCD: CPT | Performed by: PHYSICIAN ASSISTANT

## 2018-02-28 RX ORDER — HYDROCODONE BITARTRATE AND ACETAMINOPHEN 5; 325 MG/1; MG/1
TABLET ORAL
Qty: 30 TABLET | Refills: 0 | Status: SHIPPED | OUTPATIENT
Start: 2018-05-01 | End: 2018-05-30

## 2018-02-28 RX ORDER — HYDROCODONE BITARTRATE AND ACETAMINOPHEN 5; 325 MG/1; MG/1
TABLET ORAL
Qty: 30 TABLET | Refills: 0 | Status: SHIPPED | OUTPATIENT
Start: 2018-03-01 | End: 2018-03-21

## 2018-02-28 RX ORDER — HYDROCODONE BITARTRATE AND ACETAMINOPHEN 5; 325 MG/1; MG/1
TABLET ORAL
Qty: 30 TABLET | Refills: 0 | Status: SHIPPED | OUTPATIENT
Start: 2018-04-01 | End: 2018-04-30
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
## 2018-03-01 ENCOUNTER — HOSPITAL ENCOUNTER (OUTPATIENT)
Dept: PHYSICAL THERAPY | Age: 78
Discharge: HOME | End: 2018-03-31
Attending: ORTHOPAEDIC SURGERY | Admitting: ORTHOPAEDIC SURGERY

## 2018-03-06 RX ORDER — ESCITALOPRAM OXALATE 20 MG/1
TABLET ORAL
Qty: 90 TABLET | Refills: 0 | Status: SHIPPED | OUTPATIENT
Start: 2018-03-06 | End: 2018-07-09 | Stop reason: SDUPTHER

## 2018-03-13 ENCOUNTER — TELEPHONE (OUTPATIENT)
Dept: CASE MANAGEMENT | Age: 78
End: 2018-03-13

## 2018-03-13 NOTE — LETTER
[address] [place] [address]              Dear Nilsa Bah,    Our records indicate that based on your lung cancer screen performed on 3-3-17 at [facility] yearly lung screen is due. National guidelines for preventive care encourage patients who smoke, or who have a history of smoking, to receive yearly lung screens if you:    ? are between the ages of 50-69  ? have smoked a pack a day or more for 30 years (or its equivalent such as  one and a half packs a day for 20 years) and   ? continue to smoke or have quit within the past 15 year    To schedule a lung screen you first need to have a discussion with your primary care physician and obtain an order. To schedule the screen call [facility] ([phone]) at your earliest convenience. If your lung screen was done elsewhere please call to inform me; we value you as a patient and want to ensure that you are getting the appropriate care. If you have any questions or need assistance in scheduling, please dont hesitate to call.      Sincerely,  Lisa Tovar, MSN, BSN, RN, OCN  Lung Navigator  [phone]

## 2018-03-30 ENCOUNTER — OFFICE VISIT (OUTPATIENT)
Dept: ORTHOPEDIC SURGERY | Age: 78
End: 2018-03-30

## 2018-03-30 VITALS — HEIGHT: 60 IN | BODY MASS INDEX: 34.02 KG/M2 | WEIGHT: 173.28 LBS

## 2018-03-30 DIAGNOSIS — Z96.611 STATUS POST TOTAL REPLACEMENT OF RIGHT SHOULDER: Primary | ICD-10-CM

## 2018-03-30 PROCEDURE — 99213 OFFICE O/P EST LOW 20 MIN: CPT | Performed by: ORTHOPAEDIC SURGERY

## 2018-03-30 PROCEDURE — 1090F PRES/ABSN URINE INCON ASSESS: CPT | Performed by: ORTHOPAEDIC SURGERY

## 2018-03-30 PROCEDURE — 1123F ACP DISCUSS/DSCN MKR DOCD: CPT | Performed by: ORTHOPAEDIC SURGERY

## 2018-03-30 PROCEDURE — 1036F TOBACCO NON-USER: CPT | Performed by: ORTHOPAEDIC SURGERY

## 2018-03-30 PROCEDURE — 4040F PNEUMOC VAC/ADMIN/RCVD: CPT | Performed by: ORTHOPAEDIC SURGERY

## 2018-03-30 PROCEDURE — G8417 CALC BMI ABV UP PARAM F/U: HCPCS | Performed by: ORTHOPAEDIC SURGERY

## 2018-03-30 PROCEDURE — G8427 DOCREV CUR MEDS BY ELIG CLIN: HCPCS | Performed by: ORTHOPAEDIC SURGERY

## 2018-03-30 PROCEDURE — G8484 FLU IMMUNIZE NO ADMIN: HCPCS | Performed by: ORTHOPAEDIC SURGERY

## 2018-03-30 PROCEDURE — 3017F COLORECTAL CA SCREEN DOC REV: CPT | Performed by: ORTHOPAEDIC SURGERY

## 2018-03-30 PROCEDURE — G8400 PT W/DXA NO RESULTS DOC: HCPCS | Performed by: ORTHOPAEDIC SURGERY

## 2018-04-12 PROBLEM — Z01.810 PRE-OPERATIVE CARDIOVASCULAR EXAMINATION: Status: RESOLVED | Noted: 2017-12-06 | Resolved: 2018-04-12

## 2018-05-09 ENCOUNTER — OFFICE VISIT (OUTPATIENT)
Dept: FAMILY MEDICINE CLINIC | Age: 78
End: 2018-05-09

## 2018-05-09 VITALS
HEART RATE: 75 BPM | DIASTOLIC BLOOD PRESSURE: 61 MMHG | BODY MASS INDEX: 34.4 KG/M2 | RESPIRATION RATE: 12 BRPM | OXYGEN SATURATION: 98 % | SYSTOLIC BLOOD PRESSURE: 138 MMHG | WEIGHT: 175.2 LBS | HEIGHT: 60 IN | TEMPERATURE: 98 F

## 2018-05-09 DIAGNOSIS — I10 ESSENTIAL HYPERTENSION: Chronic | ICD-10-CM

## 2018-05-09 DIAGNOSIS — E78.1 HYPERTRIGLYCERIDEMIA: Chronic | ICD-10-CM

## 2018-05-09 DIAGNOSIS — B00.1 RECURRENT COLD SORES: Primary | ICD-10-CM

## 2018-05-09 PROCEDURE — 4040F PNEUMOC VAC/ADMIN/RCVD: CPT | Performed by: PHYSICIAN ASSISTANT

## 2018-05-09 PROCEDURE — G8427 DOCREV CUR MEDS BY ELIG CLIN: HCPCS | Performed by: PHYSICIAN ASSISTANT

## 2018-05-09 PROCEDURE — 1036F TOBACCO NON-USER: CPT | Performed by: PHYSICIAN ASSISTANT

## 2018-05-09 PROCEDURE — 1090F PRES/ABSN URINE INCON ASSESS: CPT | Performed by: PHYSICIAN ASSISTANT

## 2018-05-09 PROCEDURE — G8417 CALC BMI ABV UP PARAM F/U: HCPCS | Performed by: PHYSICIAN ASSISTANT

## 2018-05-09 PROCEDURE — 99213 OFFICE O/P EST LOW 20 MIN: CPT | Performed by: PHYSICIAN ASSISTANT

## 2018-05-09 PROCEDURE — G8400 PT W/DXA NO RESULTS DOC: HCPCS | Performed by: PHYSICIAN ASSISTANT

## 2018-05-09 PROCEDURE — 1123F ACP DISCUSS/DSCN MKR DOCD: CPT | Performed by: PHYSICIAN ASSISTANT

## 2018-05-09 RX ORDER — VALACYCLOVIR HYDROCHLORIDE 1 G/1
TABLET, FILM COATED ORAL
Qty: 20 TABLET | Refills: 0 | Status: SHIPPED | OUTPATIENT
Start: 2018-05-09 | End: 2018-07-09

## 2018-05-09 RX ORDER — MECLIZINE HYDROCHLORIDE 25 MG/1
25 TABLET ORAL 3 TIMES DAILY PRN
Qty: 30 TABLET | Refills: 2 | Status: SHIPPED | OUTPATIENT
Start: 2018-05-09 | End: 2018-07-09

## 2018-05-09 ASSESSMENT — PATIENT HEALTH QUESTIONNAIRE - PHQ9
SUM OF ALL RESPONSES TO PHQ9 QUESTIONS 1 & 2: 0
1. LITTLE INTEREST OR PLEASURE IN DOING THINGS: 0
2. FEELING DOWN, DEPRESSED OR HOPELESS: 0
SUM OF ALL RESPONSES TO PHQ QUESTIONS 1-9: 0

## 2018-05-30 ENCOUNTER — OFFICE VISIT (OUTPATIENT)
Dept: ORTHOPEDIC SURGERY | Age: 78
End: 2018-05-30

## 2018-05-30 VITALS
HEIGHT: 60 IN | HEART RATE: 85 BPM | BODY MASS INDEX: 34.41 KG/M2 | SYSTOLIC BLOOD PRESSURE: 145 MMHG | WEIGHT: 175.27 LBS | DIASTOLIC BLOOD PRESSURE: 63 MMHG

## 2018-05-30 DIAGNOSIS — G89.4 CHRONIC PAIN SYNDROME: Primary | ICD-10-CM

## 2018-05-30 DIAGNOSIS — Z98.890 HISTORY OF LUMBAR LAMINECTOMY FOR SPINAL CORD DECOMPRESSION: ICD-10-CM

## 2018-05-30 PROCEDURE — G8427 DOCREV CUR MEDS BY ELIG CLIN: HCPCS | Performed by: PHYSICIAN ASSISTANT

## 2018-05-30 PROCEDURE — 1036F TOBACCO NON-USER: CPT | Performed by: PHYSICIAN ASSISTANT

## 2018-05-30 PROCEDURE — G8400 PT W/DXA NO RESULTS DOC: HCPCS | Performed by: PHYSICIAN ASSISTANT

## 2018-05-30 PROCEDURE — 1123F ACP DISCUSS/DSCN MKR DOCD: CPT | Performed by: PHYSICIAN ASSISTANT

## 2018-05-30 PROCEDURE — 1090F PRES/ABSN URINE INCON ASSESS: CPT | Performed by: PHYSICIAN ASSISTANT

## 2018-05-30 PROCEDURE — 99213 OFFICE O/P EST LOW 20 MIN: CPT | Performed by: PHYSICIAN ASSISTANT

## 2018-05-30 PROCEDURE — G8417 CALC BMI ABV UP PARAM F/U: HCPCS | Performed by: PHYSICIAN ASSISTANT

## 2018-05-30 PROCEDURE — 4040F PNEUMOC VAC/ADMIN/RCVD: CPT | Performed by: PHYSICIAN ASSISTANT

## 2018-05-30 RX ORDER — HYDROCODONE BITARTRATE AND ACETAMINOPHEN 5; 325 MG/1; MG/1
1 TABLET ORAL DAILY PRN
Qty: 30 TABLET | Refills: 0 | Status: SHIPPED | OUTPATIENT
Start: 2018-06-08 | End: 2018-07-08

## 2018-05-30 RX ORDER — HYDROCODONE BITARTRATE AND ACETAMINOPHEN 5; 325 MG/1; MG/1
1 TABLET ORAL DAILY PRN
Qty: 30 TABLET | Refills: 0 | Status: SHIPPED | OUTPATIENT
Start: 2018-08-06 | End: 2018-09-05

## 2018-05-30 RX ORDER — HYDROCODONE BITARTRATE AND ACETAMINOPHEN 5; 325 MG/1; MG/1
1 TABLET ORAL DAILY PRN
Qty: 30 TABLET | Refills: 0 | Status: SHIPPED | OUTPATIENT
Start: 2018-07-08 | End: 2018-08-07

## 2018-06-14 DIAGNOSIS — I10 ESSENTIAL HYPERTENSION: Chronic | ICD-10-CM

## 2018-06-14 DIAGNOSIS — E78.1 HYPERTRIGLYCERIDEMIA: ICD-10-CM

## 2018-06-14 DIAGNOSIS — E78.5 DYSLIPIDEMIA WITH HIGH LDL AND LOW HDL: ICD-10-CM

## 2018-06-14 RX ORDER — FENOFIBRATE 145 MG/1
TABLET, COATED ORAL
Qty: 30 TABLET | Refills: 0 | Status: SHIPPED | OUTPATIENT
Start: 2018-06-14 | End: 2018-07-09 | Stop reason: SDUPTHER

## 2018-06-14 RX ORDER — LOSARTAN POTASSIUM AND HYDROCHLOROTHIAZIDE 12.5; 1 MG/1; MG/1
TABLET ORAL
Qty: 30 TABLET | Refills: 0 | Status: SHIPPED | OUTPATIENT
Start: 2018-06-14 | End: 2018-07-09 | Stop reason: SDUPTHER

## 2018-06-14 RX ORDER — SIMVASTATIN 20 MG
TABLET ORAL
Qty: 30 TABLET | Refills: 0 | Status: SHIPPED | OUTPATIENT
Start: 2018-06-14 | End: 2018-07-09 | Stop reason: SDUPTHER

## 2018-06-14 RX ORDER — DICLOFENAC SODIUM 75 MG/1
TABLET, DELAYED RELEASE ORAL
Qty: 60 TABLET | Refills: 0 | Status: SHIPPED | OUTPATIENT
Start: 2018-06-14 | End: 2018-07-09 | Stop reason: SDUPTHER

## 2018-07-09 ENCOUNTER — OFFICE VISIT (OUTPATIENT)
Dept: FAMILY MEDICINE CLINIC | Age: 78
End: 2018-07-09

## 2018-07-09 VITALS
HEIGHT: 60 IN | OXYGEN SATURATION: 93 % | HEART RATE: 63 BPM | SYSTOLIC BLOOD PRESSURE: 122 MMHG | DIASTOLIC BLOOD PRESSURE: 72 MMHG | BODY MASS INDEX: 33.96 KG/M2 | WEIGHT: 173 LBS

## 2018-07-09 DIAGNOSIS — E78.5 DYSLIPIDEMIA WITH HIGH LDL AND LOW HDL: Primary | ICD-10-CM

## 2018-07-09 DIAGNOSIS — R53.83 FATIGUE, UNSPECIFIED TYPE: ICD-10-CM

## 2018-07-09 DIAGNOSIS — I10 ESSENTIAL HYPERTENSION: Chronic | ICD-10-CM

## 2018-07-09 DIAGNOSIS — E78.1 HYPERTRIGLYCERIDEMIA: ICD-10-CM

## 2018-07-09 DIAGNOSIS — F32.89 OTHER DEPRESSION: Chronic | ICD-10-CM

## 2018-07-09 PROBLEM — M75.122 COMPLETE TEAR OF LEFT ROTATOR CUFF: Status: RESOLVED | Noted: 2017-05-26 | Resolved: 2018-07-09

## 2018-07-09 PROBLEM — M19.079 OSTEOARTHRITIS OF TOE JOINT: Status: RESOLVED | Noted: 2017-06-13 | Resolved: 2018-07-09

## 2018-07-09 LAB
A/G RATIO: 1.6 (ref 1.1–2.2)
ALBUMIN SERPL-MCNC: 4.5 G/DL (ref 3.4–5)
ALP BLD-CCNC: 48 U/L (ref 40–129)
ALT SERPL-CCNC: 20 U/L (ref 10–40)
ANION GAP SERPL CALCULATED.3IONS-SCNC: 16 MMOL/L (ref 3–16)
AST SERPL-CCNC: 17 U/L (ref 15–37)
BILIRUB SERPL-MCNC: <0.2 MG/DL (ref 0–1)
BUN BLDV-MCNC: 22 MG/DL (ref 7–20)
CALCIUM SERPL-MCNC: 10.1 MG/DL (ref 8.3–10.6)
CHLORIDE BLD-SCNC: 99 MMOL/L (ref 99–110)
CHOLESTEROL, TOTAL: 173 MG/DL (ref 0–199)
CO2: 27 MMOL/L (ref 21–32)
CREAT SERPL-MCNC: 0.7 MG/DL (ref 0.6–1.2)
GFR AFRICAN AMERICAN: >60
GFR NON-AFRICAN AMERICAN: >60
GLOBULIN: 2.8 G/DL
GLUCOSE BLD-MCNC: 96 MG/DL (ref 70–99)
HDLC SERPL-MCNC: 35 MG/DL (ref 40–60)
LDL CHOLESTEROL CALCULATED: 96 MG/DL
POTASSIUM SERPL-SCNC: 4.9 MMOL/L (ref 3.5–5.1)
SODIUM BLD-SCNC: 142 MMOL/L (ref 136–145)
TOTAL PROTEIN: 7.3 G/DL (ref 6.4–8.2)
TRIGL SERPL-MCNC: 208 MG/DL (ref 0–150)
TSH SERPL DL<=0.05 MIU/L-ACNC: 3.26 UIU/ML (ref 0.27–4.2)
VLDLC SERPL CALC-MCNC: 42 MG/DL

## 2018-07-09 PROCEDURE — 4040F PNEUMOC VAC/ADMIN/RCVD: CPT | Performed by: FAMILY MEDICINE

## 2018-07-09 PROCEDURE — 1036F TOBACCO NON-USER: CPT | Performed by: FAMILY MEDICINE

## 2018-07-09 PROCEDURE — 99214 OFFICE O/P EST MOD 30 MIN: CPT | Performed by: FAMILY MEDICINE

## 2018-07-09 PROCEDURE — G8400 PT W/DXA NO RESULTS DOC: HCPCS | Performed by: FAMILY MEDICINE

## 2018-07-09 PROCEDURE — G8417 CALC BMI ABV UP PARAM F/U: HCPCS | Performed by: FAMILY MEDICINE

## 2018-07-09 PROCEDURE — 1123F ACP DISCUSS/DSCN MKR DOCD: CPT | Performed by: FAMILY MEDICINE

## 2018-07-09 PROCEDURE — 1090F PRES/ABSN URINE INCON ASSESS: CPT | Performed by: FAMILY MEDICINE

## 2018-07-09 PROCEDURE — G8427 DOCREV CUR MEDS BY ELIG CLIN: HCPCS | Performed by: FAMILY MEDICINE

## 2018-07-09 PROCEDURE — 36415 COLL VENOUS BLD VENIPUNCTURE: CPT | Performed by: FAMILY MEDICINE

## 2018-07-09 RX ORDER — SIMVASTATIN 20 MG
TABLET ORAL
Qty: 90 TABLET | Refills: 1 | Status: SHIPPED | OUTPATIENT
Start: 2018-07-09 | End: 2019-07-12 | Stop reason: SDUPTHER

## 2018-07-09 RX ORDER — FENOFIBRATE 145 MG/1
TABLET, COATED ORAL
Qty: 90 TABLET | Refills: 1 | Status: SHIPPED | OUTPATIENT
Start: 2018-07-09 | End: 2019-07-12 | Stop reason: SDUPTHER

## 2018-07-09 RX ORDER — DICLOFENAC SODIUM 75 MG/1
TABLET, DELAYED RELEASE ORAL
Qty: 180 TABLET | Refills: 1 | Status: SHIPPED | OUTPATIENT
Start: 2018-07-09 | End: 2020-03-04

## 2018-07-09 RX ORDER — ESCITALOPRAM OXALATE 20 MG/1
TABLET ORAL
Qty: 90 TABLET | Refills: 1 | Status: SHIPPED | OUTPATIENT
Start: 2018-07-09 | End: 2019-08-27 | Stop reason: SDUPTHER

## 2018-07-09 RX ORDER — LOSARTAN POTASSIUM AND HYDROCHLOROTHIAZIDE 12.5; 1 MG/1; MG/1
TABLET ORAL
Qty: 90 TABLET | Refills: 1 | Status: SHIPPED | OUTPATIENT
Start: 2018-07-09 | End: 2019-01-04 | Stop reason: SDUPTHER

## 2018-07-09 NOTE — PROGRESS NOTES
SUBJECTIVE:  Adriana Montelongo is a 70 y.o. female who presents for evaluation of depression, osteoarthritis, hypertension and hyperlipidemia. She indicates that she is feeling well and denies any symptoms referable to her elevated blood pressure. Specifically denies chest pain, palpitations, dyspnea, orthopnea, PND or peripheral edema or neuro sx. No anorexia, , or leg cramps noted. Current medication regimen is as listed below. She denies any side effects of medication, and has been taking it regularly. Lab Results   Component Value Date    LDLCALC see below 01/03/2018    LDLDIRECT 123 (H) 01/03/2018       This is my first visit with the patient, former patient of Dr. Ronna Heredia. She is complaining of fatigue and sleeping excessively. She has trouble falling asleep but when she does she sleeps for 8 or 10 hours. Her depression is stable on the Lexapro. She sees orthopedics for her chronic arthritis pain. She takes her diclofenac once a day. Orthopedics prescribes the hydrocodone she takes that as needed    Current Outpatient Prescriptions   Medication Sig Dispense Refill    simvastatin (ZOCOR) 20 MG tablet TAKE ONE TABLET BY MOUTH ONCE NIGHTLY 90 tablet 1    losartan-hydrochlorothiazide (HYZAAR) 100-12.5 MG per tablet TAKE ONE TABLET BY MOUTH DAILY 90 tablet 1    fenofibrate (TRICOR) 145 MG tablet TAKE ONE TABLET BY MOUTH DAILY 90 tablet 1    escitalopram (LEXAPRO) 20 MG tablet TAKE ONE TABLET BY MOUTH DAILY 90 tablet 1    diclofenac (VOLTAREN) 75 MG EC tablet TAKE ONE TABLET BY MOUTH TWICE A  tablet 1    HYDROcodone-acetaminophen (NORCO) 5-325 MG per tablet Take 1 tablet by mouth daily as needed for Pain for up to 30 days. Ana Bernal Date: 7/8/18 30 tablet 0    [START ON 8/6/2018] HYDROcodone-acetaminophen (NORCO) 5-325 MG per tablet Take 1 tablet by mouth daily as needed for Pain for up to 30 days. Ana Bernal Date: 8/6/18 30 tablet 0    varenicline (CHANTIX CONTINUING MONTH ESTEFANÍA) 1 MG tablet

## 2018-09-10 ENCOUNTER — OFFICE VISIT (OUTPATIENT)
Dept: ORTHOPEDIC SURGERY | Age: 78
End: 2018-09-10

## 2018-09-10 VITALS
HEART RATE: 82 BPM | DIASTOLIC BLOOD PRESSURE: 53 MMHG | SYSTOLIC BLOOD PRESSURE: 118 MMHG | BODY MASS INDEX: 34.36 KG/M2 | HEIGHT: 60 IN | WEIGHT: 175 LBS

## 2018-09-10 DIAGNOSIS — G89.4 CHRONIC PAIN SYNDROME: Primary | ICD-10-CM

## 2018-09-10 DIAGNOSIS — M54.16 RIGHT LUMBAR RADICULITIS: ICD-10-CM

## 2018-09-10 DIAGNOSIS — M51.36 DDD (DEGENERATIVE DISC DISEASE), LUMBAR: ICD-10-CM

## 2018-09-10 PROCEDURE — 1101F PT FALLS ASSESS-DOCD LE1/YR: CPT | Performed by: PHYSICIAN ASSISTANT

## 2018-09-10 PROCEDURE — 1123F ACP DISCUSS/DSCN MKR DOCD: CPT | Performed by: PHYSICIAN ASSISTANT

## 2018-09-10 PROCEDURE — 1090F PRES/ABSN URINE INCON ASSESS: CPT | Performed by: PHYSICIAN ASSISTANT

## 2018-09-10 PROCEDURE — G8417 CALC BMI ABV UP PARAM F/U: HCPCS | Performed by: PHYSICIAN ASSISTANT

## 2018-09-10 PROCEDURE — G8427 DOCREV CUR MEDS BY ELIG CLIN: HCPCS | Performed by: PHYSICIAN ASSISTANT

## 2018-09-10 PROCEDURE — 4040F PNEUMOC VAC/ADMIN/RCVD: CPT | Performed by: PHYSICIAN ASSISTANT

## 2018-09-10 PROCEDURE — 99213 OFFICE O/P EST LOW 20 MIN: CPT | Performed by: PHYSICIAN ASSISTANT

## 2018-09-10 PROCEDURE — G8400 PT W/DXA NO RESULTS DOC: HCPCS | Performed by: PHYSICIAN ASSISTANT

## 2018-09-10 PROCEDURE — 1036F TOBACCO NON-USER: CPT | Performed by: PHYSICIAN ASSISTANT

## 2018-09-10 RX ORDER — HYDROCODONE BITARTRATE AND ACETAMINOPHEN 5; 325 MG/1; MG/1
1 TABLET ORAL DAILY PRN
Qty: 30 TABLET | Refills: 0 | Status: SHIPPED | OUTPATIENT
Start: 2018-11-08 | End: 2018-12-08

## 2018-09-10 RX ORDER — HYDROCODONE BITARTRATE AND ACETAMINOPHEN 5; 325 MG/1; MG/1
1 TABLET ORAL DAILY PRN
Qty: 30 TABLET | Refills: 0 | Status: SHIPPED | OUTPATIENT
Start: 2018-10-09 | End: 2018-11-08

## 2018-09-10 RX ORDER — HYDROCODONE BITARTRATE AND ACETAMINOPHEN 5; 325 MG/1; MG/1
1 TABLET ORAL DAILY PRN
Qty: 30 TABLET | Refills: 0 | Status: SHIPPED | OUTPATIENT
Start: 2018-09-10 | End: 2018-10-10

## 2018-09-10 NOTE — PROGRESS NOTES
symmetrically trace with reinforcement at the patellar and ankle tendons. Clonus absent bilaterally at the feet. · Gait & station: Forward flexed unassisted   · Additional Examinations:   · RIGHT LOWER EXTREMITY: Inspection/examination of the right lower extremity does not show any tenderness, deformity or injury. Range of motion is full. There is no gross instability. There are no rashes, ulcerations or lesions. Strength and tone are normal.  · LEFT LOWER EXTREMITY:  Inspection/examination of the left lower extremity does not show any tenderness, deformity or injury. Range of motion is full. There is no gross instability. There are no rashes, ulcerations or lesions. Strength and tone are normal.    Diagnostic Testing:   UDS February 28, 2018 negative for all (last dose 3 days prior)    UDS 4-28-17 + for OPI as expected      UDS 4-8-16: Negative for all as expected, last dose was last week (taking PRN)      Lumbar radiographs 3-27-15 2 views AP/LAT: L4-5 spondy. Moderate DDD L4-5 & L5-S1      MRI 11/28/2011:  IMPRESSION-         1. Stable L4 and L5 left pedicle edema of uncertain etiology.         2. L4-L5 facet arthropathy, particularly on the left where there    is a left L4 and L5 nerve root compression.                     Impression:  1) Chronic right mechanical back pain, mild right radiculitis--stable   2) h/o lumbar decompression, Dr. Nacho Liriano   3) H/o multiple interventional procedures, Dr. Duke Marx  4) Right hip pain, OA, Dr. Dick Shell  5) Opioid maintenance, moderate risk per ORT   6) S/p R TSR, Dr. Carla Ruiz          Plan:    1) Norco 5/325 I po qd PRN #30 x 3 scripts  2) F/u 3mo    The risks and use of maintenance opiate medication were reviewed. These include the risk of tolerance, addition, and abuse. Potential side effects were also discussed. Medications are to be taken as prescribed and not escalated without prior agreement. LEENA/NITA reviewed & appropriate.    Opiate medications will only be prescribed through the office of JOAN Myers unless notified otherwise    Controlled Substances Monitoring:     RX Monitoring 9/10/2018   Attestation The Prescription Monitoring Report for this patient was reviewed today. Documentation Possible medication side effects, risk of tolerance/dependence & alternative treatments discussed. ;Obtaining appropriate analgesic effect of treatment. ;No signs of potential drug abuse or diversion identified. Chronic Pain Treatment objectives documented - patient is progressing appropriately. ;Functional status reviewed - continues with improved or maintaining ADL's.;Reviewed the patient's functional status and documentation.                 Callie Ladd  [facility]

## 2018-12-07 RX ORDER — VALACYCLOVIR HYDROCHLORIDE 1 G/1
TABLET, FILM COATED ORAL
Qty: 20 TABLET | Refills: 0 | Status: SHIPPED | OUTPATIENT
Start: 2018-12-07 | End: 2019-05-29 | Stop reason: SDUPTHER

## 2019-01-02 ENCOUNTER — OFFICE VISIT (OUTPATIENT)
Dept: ORTHOPEDIC SURGERY | Age: 79
End: 2019-01-02
Payer: MEDICARE

## 2019-01-02 VITALS
WEIGHT: 175.04 LBS | BODY MASS INDEX: 34.37 KG/M2 | HEART RATE: 81 BPM | SYSTOLIC BLOOD PRESSURE: 120 MMHG | HEIGHT: 60 IN | DIASTOLIC BLOOD PRESSURE: 78 MMHG

## 2019-01-02 DIAGNOSIS — M51.36 DDD (DEGENERATIVE DISC DISEASE), LUMBAR: ICD-10-CM

## 2019-01-02 DIAGNOSIS — G89.4 CHRONIC PAIN SYNDROME: Primary | ICD-10-CM

## 2019-01-02 DIAGNOSIS — M54.16 LUMBAR RADICULITIS: ICD-10-CM

## 2019-01-02 PROCEDURE — 1090F PRES/ABSN URINE INCON ASSESS: CPT | Performed by: PHYSICIAN ASSISTANT

## 2019-01-02 PROCEDURE — 1036F TOBACCO NON-USER: CPT | Performed by: PHYSICIAN ASSISTANT

## 2019-01-02 PROCEDURE — 1101F PT FALLS ASSESS-DOCD LE1/YR: CPT | Performed by: PHYSICIAN ASSISTANT

## 2019-01-02 PROCEDURE — 99213 OFFICE O/P EST LOW 20 MIN: CPT | Performed by: PHYSICIAN ASSISTANT

## 2019-01-02 PROCEDURE — 4040F PNEUMOC VAC/ADMIN/RCVD: CPT | Performed by: PHYSICIAN ASSISTANT

## 2019-01-02 PROCEDURE — 1123F ACP DISCUSS/DSCN MKR DOCD: CPT | Performed by: PHYSICIAN ASSISTANT

## 2019-01-02 PROCEDURE — G8484 FLU IMMUNIZE NO ADMIN: HCPCS | Performed by: PHYSICIAN ASSISTANT

## 2019-01-02 PROCEDURE — G8427 DOCREV CUR MEDS BY ELIG CLIN: HCPCS | Performed by: PHYSICIAN ASSISTANT

## 2019-01-02 PROCEDURE — G8400 PT W/DXA NO RESULTS DOC: HCPCS | Performed by: PHYSICIAN ASSISTANT

## 2019-01-02 PROCEDURE — G8417 CALC BMI ABV UP PARAM F/U: HCPCS | Performed by: PHYSICIAN ASSISTANT

## 2019-01-02 RX ORDER — HYDROCODONE BITARTRATE AND ACETAMINOPHEN 5; 325 MG/1; MG/1
1 TABLET ORAL DAILY PRN
Qty: 30 TABLET | Refills: 0 | Status: SHIPPED | OUTPATIENT
Start: 2019-03-01 | End: 2019-03-31

## 2019-01-02 RX ORDER — HYDROCODONE BITARTRATE AND ACETAMINOPHEN 5; 325 MG/1; MG/1
1 TABLET ORAL DAILY PRN
Qty: 30 TABLET | Refills: 0 | Status: SHIPPED | OUTPATIENT
Start: 2019-01-31 | End: 2019-02-13 | Stop reason: SDUPTHER

## 2019-01-02 RX ORDER — HYDROCODONE BITARTRATE AND ACETAMINOPHEN 5; 325 MG/1; MG/1
1 TABLET ORAL DAILY PRN
Qty: 30 TABLET | Refills: 0 | Status: SHIPPED | OUTPATIENT
Start: 2019-01-02 | End: 2019-02-01

## 2019-02-03 DIAGNOSIS — I10 ESSENTIAL HYPERTENSION: Chronic | ICD-10-CM

## 2019-02-04 RX ORDER — LOSARTAN POTASSIUM AND HYDROCHLOROTHIAZIDE 12.5; 1 MG/1; MG/1
TABLET ORAL
Qty: 30 TABLET | Refills: 0 | Status: SHIPPED | OUTPATIENT
Start: 2019-02-04 | End: 2019-08-27 | Stop reason: SDUPTHER

## 2019-02-13 ENCOUNTER — OFFICE VISIT (OUTPATIENT)
Dept: FAMILY MEDICINE CLINIC | Age: 79
End: 2019-02-13
Payer: MEDICARE

## 2019-02-13 VITALS
DIASTOLIC BLOOD PRESSURE: 70 MMHG | WEIGHT: 176 LBS | OXYGEN SATURATION: 96 % | HEART RATE: 86 BPM | SYSTOLIC BLOOD PRESSURE: 128 MMHG | HEIGHT: 60 IN | BODY MASS INDEX: 34.55 KG/M2

## 2019-02-13 DIAGNOSIS — Z87.891 PERSONAL HISTORY OF TOBACCO USE: ICD-10-CM

## 2019-02-13 DIAGNOSIS — E78.6 HDL LIPOPROTEIN DEFICIENCY: Chronic | ICD-10-CM

## 2019-02-13 DIAGNOSIS — E78.1 HYPERTRIGLYCERIDEMIA: Chronic | ICD-10-CM

## 2019-02-13 DIAGNOSIS — I10 ESSENTIAL HYPERTENSION: Primary | Chronic | ICD-10-CM

## 2019-02-13 DIAGNOSIS — R61 EXCESSIVE SWEATING: ICD-10-CM

## 2019-02-13 DIAGNOSIS — F34.1 DYSTHYMIA: Chronic | ICD-10-CM

## 2019-02-13 LAB
BASOPHILS ABSOLUTE: 0 K/UL (ref 0–0.2)
BASOPHILS RELATIVE PERCENT: 0.5 %
EOSINOPHILS ABSOLUTE: 0.3 K/UL (ref 0–0.6)
EOSINOPHILS RELATIVE PERCENT: 2.7 %
HCT VFR BLD CALC: 42.4 % (ref 36–48)
HEMOGLOBIN: 14 G/DL (ref 12–16)
LYMPHOCYTES ABSOLUTE: 3 K/UL (ref 1–5.1)
LYMPHOCYTES RELATIVE PERCENT: 32 %
MCH RBC QN AUTO: 28.9 PG (ref 26–34)
MCHC RBC AUTO-ENTMCNC: 33 G/DL (ref 31–36)
MCV RBC AUTO: 87.5 FL (ref 80–100)
MONOCYTES ABSOLUTE: 0.7 K/UL (ref 0–1.3)
MONOCYTES RELATIVE PERCENT: 7.9 %
NEUTROPHILS ABSOLUTE: 5.4 K/UL (ref 1.7–7.7)
NEUTROPHILS RELATIVE PERCENT: 56.9 %
PDW BLD-RTO: 14.1 % (ref 12.4–15.4)
PLATELET # BLD: 362 K/UL (ref 135–450)
PMV BLD AUTO: 8.4 FL (ref 5–10.5)
RBC # BLD: 4.84 M/UL (ref 4–5.2)
WBC # BLD: 9.4 K/UL (ref 4–11)

## 2019-02-13 PROCEDURE — G8417 CALC BMI ABV UP PARAM F/U: HCPCS | Performed by: FAMILY MEDICINE

## 2019-02-13 PROCEDURE — 36415 COLL VENOUS BLD VENIPUNCTURE: CPT | Performed by: FAMILY MEDICINE

## 2019-02-13 PROCEDURE — 4040F PNEUMOC VAC/ADMIN/RCVD: CPT | Performed by: FAMILY MEDICINE

## 2019-02-13 PROCEDURE — 1101F PT FALLS ASSESS-DOCD LE1/YR: CPT | Performed by: FAMILY MEDICINE

## 2019-02-13 PROCEDURE — 1090F PRES/ABSN URINE INCON ASSESS: CPT | Performed by: FAMILY MEDICINE

## 2019-02-13 PROCEDURE — 1036F TOBACCO NON-USER: CPT | Performed by: FAMILY MEDICINE

## 2019-02-13 PROCEDURE — 1123F ACP DISCUSS/DSCN MKR DOCD: CPT | Performed by: FAMILY MEDICINE

## 2019-02-13 PROCEDURE — G8400 PT W/DXA NO RESULTS DOC: HCPCS | Performed by: FAMILY MEDICINE

## 2019-02-13 PROCEDURE — 99214 OFFICE O/P EST MOD 30 MIN: CPT | Performed by: FAMILY MEDICINE

## 2019-02-13 PROCEDURE — G8427 DOCREV CUR MEDS BY ELIG CLIN: HCPCS | Performed by: FAMILY MEDICINE

## 2019-02-13 PROCEDURE — G0296 VISIT TO DETERM LDCT ELIG: HCPCS | Performed by: FAMILY MEDICINE

## 2019-02-13 PROCEDURE — G8484 FLU IMMUNIZE NO ADMIN: HCPCS | Performed by: FAMILY MEDICINE

## 2019-02-14 LAB
A/G RATIO: 1.6 (ref 1.1–2.2)
ALBUMIN SERPL-MCNC: 4.7 G/DL (ref 3.4–5)
ALP BLD-CCNC: 54 U/L (ref 40–129)
ALT SERPL-CCNC: 18 U/L (ref 10–40)
ANION GAP SERPL CALCULATED.3IONS-SCNC: 15 MMOL/L (ref 3–16)
AST SERPL-CCNC: 16 U/L (ref 15–37)
BILIRUB SERPL-MCNC: 0.3 MG/DL (ref 0–1)
BUN BLDV-MCNC: 23 MG/DL (ref 7–20)
CALCIUM SERPL-MCNC: 10.4 MG/DL (ref 8.3–10.6)
CHLORIDE BLD-SCNC: 99 MMOL/L (ref 99–110)
CHOLESTEROL, TOTAL: 207 MG/DL (ref 0–199)
CO2: 26 MMOL/L (ref 21–32)
CREAT SERPL-MCNC: 0.7 MG/DL (ref 0.6–1.2)
GFR AFRICAN AMERICAN: >60
GFR NON-AFRICAN AMERICAN: >60
GLOBULIN: 3 G/DL
GLUCOSE BLD-MCNC: 101 MG/DL (ref 70–99)
HDLC SERPL-MCNC: 35 MG/DL (ref 40–60)
LDL CHOLESTEROL CALCULATED: 127 MG/DL
POTASSIUM SERPL-SCNC: 4.8 MMOL/L (ref 3.5–5.1)
SODIUM BLD-SCNC: 140 MMOL/L (ref 136–145)
TOTAL PROTEIN: 7.7 G/DL (ref 6.4–8.2)
TRIGL SERPL-MCNC: 227 MG/DL (ref 0–150)
VLDLC SERPL CALC-MCNC: 45 MG/DL

## 2019-02-28 ENCOUNTER — HOSPITAL ENCOUNTER (OUTPATIENT)
Dept: CT IMAGING | Age: 79
Discharge: HOME | End: 2019-02-28
Payer: MEDICARE

## 2019-02-28 DIAGNOSIS — Z87.891 PERSONAL HISTORY OF TOBACCO USE: ICD-10-CM

## 2019-02-28 PROCEDURE — G0297 LDCT FOR LUNG CA SCREEN: HCPCS

## 2019-04-02 ENCOUNTER — OFFICE VISIT (OUTPATIENT)
Dept: ORTHOPEDIC SURGERY | Age: 79
End: 2019-04-02
Payer: MEDICARE

## 2019-04-02 VITALS
HEART RATE: 79 BPM | WEIGHT: 175.93 LBS | HEIGHT: 60 IN | SYSTOLIC BLOOD PRESSURE: 175 MMHG | DIASTOLIC BLOOD PRESSURE: 87 MMHG | BODY MASS INDEX: 34.54 KG/M2

## 2019-04-02 DIAGNOSIS — G89.4 CHRONIC PAIN SYNDROME: Primary | ICD-10-CM

## 2019-04-02 DIAGNOSIS — M54.16 LUMBAR RADICULITIS: ICD-10-CM

## 2019-04-02 DIAGNOSIS — M51.36 DDD (DEGENERATIVE DISC DISEASE), LUMBAR: ICD-10-CM

## 2019-04-02 PROCEDURE — G8427 DOCREV CUR MEDS BY ELIG CLIN: HCPCS | Performed by: PHYSICIAN ASSISTANT

## 2019-04-02 PROCEDURE — 1123F ACP DISCUSS/DSCN MKR DOCD: CPT | Performed by: PHYSICIAN ASSISTANT

## 2019-04-02 PROCEDURE — G8400 PT W/DXA NO RESULTS DOC: HCPCS | Performed by: PHYSICIAN ASSISTANT

## 2019-04-02 PROCEDURE — 4040F PNEUMOC VAC/ADMIN/RCVD: CPT | Performed by: PHYSICIAN ASSISTANT

## 2019-04-02 PROCEDURE — 1090F PRES/ABSN URINE INCON ASSESS: CPT | Performed by: PHYSICIAN ASSISTANT

## 2019-04-02 PROCEDURE — 99214 OFFICE O/P EST MOD 30 MIN: CPT | Performed by: PHYSICIAN ASSISTANT

## 2019-04-02 PROCEDURE — 1036F TOBACCO NON-USER: CPT | Performed by: PHYSICIAN ASSISTANT

## 2019-04-02 PROCEDURE — G8417 CALC BMI ABV UP PARAM F/U: HCPCS | Performed by: PHYSICIAN ASSISTANT

## 2019-04-02 RX ORDER — HYDROCODONE BITARTRATE AND ACETAMINOPHEN 5; 325 MG/1; MG/1
1 TABLET ORAL DAILY PRN
Qty: 30 TABLET | Refills: 0 | Status: SHIPPED | OUTPATIENT
Start: 2019-05-10 | End: 2019-06-09

## 2019-04-02 RX ORDER — HYDROCODONE BITARTRATE AND ACETAMINOPHEN 5; 325 MG/1; MG/1
1 TABLET ORAL DAILY PRN
Qty: 30 TABLET | Refills: 0 | Status: SHIPPED | OUTPATIENT
Start: 2019-04-11 | End: 2019-05-11

## 2019-04-02 RX ORDER — HYDROCODONE BITARTRATE AND ACETAMINOPHEN 5; 325 MG/1; MG/1
1 TABLET ORAL DAILY PRN
Qty: 30 TABLET | Refills: 0 | Status: SHIPPED | OUTPATIENT
Start: 2019-06-09 | End: 2019-07-09
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
## 2019-05-29 RX ORDER — VALACYCLOVIR HYDROCHLORIDE 1 G/1
TABLET, FILM COATED ORAL
Qty: 20 TABLET | Refills: 2 | Status: SHIPPED | OUTPATIENT
Start: 2019-05-29 | End: 2020-06-05

## 2019-05-29 NOTE — TELEPHONE ENCOUNTER
Devante Roque is requesting refill(s) valtrex  Last OV 2/13/19 (pertaining to medication)  LR 12/7/18 (per medication requested)  Next office visit scheduled or attempted Yes   If no, reason:  LM pt is due in August

## 2019-06-11 RX ORDER — DICYCLOMINE HYDROCHLORIDE 10 MG/1
10 CAPSULE ORAL 4 TIMES DAILY PRN
Qty: 90 CAPSULE | Refills: 0 | Status: SHIPPED | OUTPATIENT
Start: 2019-06-11 | End: 2022-04-18 | Stop reason: SDUPTHER

## 2019-06-11 NOTE — TELEPHONE ENCOUNTER
Rachelle Dayo [phone] (home) [phone] (work)   is requesting refill(s) of medication dicyclomine to preferred pharmacy [facility]     Last OV 2/13/19 (pertaining to medication)   Last refill 2/17/17 (per medication requested)  Next office visit scheduled or attempted No  Date   If No, reason Due In Aug

## 2019-06-25 ENCOUNTER — OFFICE VISIT (OUTPATIENT)
Dept: ORTHOPEDIC SURGERY | Age: 79
End: 2019-06-25
Payer: MEDICARE

## 2019-06-25 VITALS
WEIGHT: 175.93 LBS | BODY MASS INDEX: 34.54 KG/M2 | HEART RATE: 84 BPM | HEIGHT: 60 IN | DIASTOLIC BLOOD PRESSURE: 65 MMHG | SYSTOLIC BLOOD PRESSURE: 135 MMHG

## 2019-06-25 DIAGNOSIS — M54.16 LUMBAR RADICULITIS: ICD-10-CM

## 2019-06-25 DIAGNOSIS — M51.36 DDD (DEGENERATIVE DISC DISEASE), LUMBAR: ICD-10-CM

## 2019-06-25 DIAGNOSIS — G89.4 CHRONIC PAIN SYNDROME: Primary | ICD-10-CM

## 2019-06-25 PROCEDURE — 99213 OFFICE O/P EST LOW 20 MIN: CPT | Performed by: PHYSICIAN ASSISTANT

## 2019-06-25 PROCEDURE — G8417 CALC BMI ABV UP PARAM F/U: HCPCS | Performed by: PHYSICIAN ASSISTANT

## 2019-06-25 PROCEDURE — 1090F PRES/ABSN URINE INCON ASSESS: CPT | Performed by: PHYSICIAN ASSISTANT

## 2019-06-25 PROCEDURE — 1123F ACP DISCUSS/DSCN MKR DOCD: CPT | Performed by: PHYSICIAN ASSISTANT

## 2019-06-25 PROCEDURE — 4040F PNEUMOC VAC/ADMIN/RCVD: CPT | Performed by: PHYSICIAN ASSISTANT

## 2019-06-25 PROCEDURE — G8427 DOCREV CUR MEDS BY ELIG CLIN: HCPCS | Performed by: PHYSICIAN ASSISTANT

## 2019-06-25 PROCEDURE — G8400 PT W/DXA NO RESULTS DOC: HCPCS | Performed by: PHYSICIAN ASSISTANT

## 2019-06-25 PROCEDURE — 1036F TOBACCO NON-USER: CPT | Performed by: PHYSICIAN ASSISTANT

## 2019-06-25 RX ORDER — HYDROCODONE BITARTRATE AND ACETAMINOPHEN 5; 325 MG/1; MG/1
1 TABLET ORAL DAILY PRN
Qty: 30 TABLET | Refills: 0 | Status: SHIPPED | OUTPATIENT
Start: 2019-07-11 | End: 2019-08-10

## 2019-06-25 RX ORDER — HYDROCODONE BITARTRATE AND ACETAMINOPHEN 5; 325 MG/1; MG/1
1 TABLET ORAL DAILY PRN
Qty: 30 TABLET | Refills: 0 | Status: SHIPPED | OUTPATIENT
Start: 2019-09-07 | End: 2019-10-07

## 2019-06-25 RX ORDER — HYDROCODONE BITARTRATE AND ACETAMINOPHEN 5; 325 MG/1; MG/1
1 TABLET ORAL DAILY PRN
Qty: 30 TABLET | Refills: 0 | Status: SHIPPED | OUTPATIENT
Start: 2019-08-09 | End: 2019-09-08
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
## 2019-07-12 DIAGNOSIS — E78.1 HYPERTRIGLYCERIDEMIA: ICD-10-CM

## 2019-07-12 DIAGNOSIS — E78.5 DYSLIPIDEMIA WITH HIGH LDL AND LOW HDL: ICD-10-CM

## 2019-07-12 RX ORDER — SIMVASTATIN 20 MG
TABLET ORAL
Qty: 90 TABLET | Refills: 0 | Status: SHIPPED | OUTPATIENT
Start: 2019-07-12 | End: 2019-08-27 | Stop reason: SDUPTHER

## 2019-07-12 RX ORDER — FENOFIBRATE 145 MG/1
TABLET, COATED ORAL
Qty: 90 TABLET | Refills: 0 | Status: SHIPPED | OUTPATIENT
Start: 2019-07-12 | End: 2019-08-27 | Stop reason: SDUPTHER

## 2019-07-12 NOTE — TELEPHONE ENCOUNTER
Joselito Pabon [phone] (home) [phone] (work)   is requesting refill(s) of medication Simvastatin to preferred pharmacy [facility] 2/13/19 (pertaining to medication)   Last refill 7/9/18 (per medication requested)  Next office visit scheduled or attempted No  Date   If No, reason Due in August         Marianela Santos [facility] [phone] (home) [phone] (work)   is requesting refill(s) of medication Cardoso Maggyon to preferred pharmacy [facility] 2/13/19 (pertaining to medication)   Last refill 7/9/18 (per medication requested)  Next office visit scheduled or attempted No  Date   If No, reason Due in August

## 2019-08-27 ENCOUNTER — OFFICE VISIT (OUTPATIENT)
Dept: FAMILY MEDICINE CLINIC | Age: 79
End: 2019-08-27
Payer: MEDICARE

## 2019-08-27 VITALS
BODY MASS INDEX: 33.96 KG/M2 | DIASTOLIC BLOOD PRESSURE: 60 MMHG | WEIGHT: 173 LBS | SYSTOLIC BLOOD PRESSURE: 124 MMHG | OXYGEN SATURATION: 95 % | HEART RATE: 73 BPM | HEIGHT: 60 IN

## 2019-08-27 DIAGNOSIS — E78.5 DYSLIPIDEMIA WITH HIGH LDL AND LOW HDL: ICD-10-CM

## 2019-08-27 DIAGNOSIS — F32.89 OTHER DEPRESSION: Chronic | ICD-10-CM

## 2019-08-27 DIAGNOSIS — M19.011 OSTEOARTHRITIS OF BOTH SHOULDERS, UNSPECIFIED OSTEOARTHRITIS TYPE: ICD-10-CM

## 2019-08-27 DIAGNOSIS — I10 ESSENTIAL HYPERTENSION: Primary | Chronic | ICD-10-CM

## 2019-08-27 DIAGNOSIS — E78.1 HYPERTRIGLYCERIDEMIA: ICD-10-CM

## 2019-08-27 DIAGNOSIS — M19.012 OSTEOARTHRITIS OF BOTH SHOULDERS, UNSPECIFIED OSTEOARTHRITIS TYPE: ICD-10-CM

## 2019-08-27 LAB
A/G RATIO: 1.8 (ref 1.1–2.2)
ALBUMIN SERPL-MCNC: 4.9 G/DL (ref 3.4–5)
ALP BLD-CCNC: 42 U/L (ref 40–129)
ALT SERPL-CCNC: 13 U/L (ref 10–40)
ANION GAP SERPL CALCULATED.3IONS-SCNC: 14 MMOL/L (ref 3–16)
AST SERPL-CCNC: 16 U/L (ref 15–37)
BILIRUB SERPL-MCNC: 0.3 MG/DL (ref 0–1)
BUN BLDV-MCNC: 19 MG/DL (ref 7–20)
CALCIUM SERPL-MCNC: 10.3 MG/DL (ref 8.3–10.6)
CHLORIDE BLD-SCNC: 98 MMOL/L (ref 99–110)
CHOLESTEROL, TOTAL: 170 MG/DL (ref 0–199)
CO2: 26 MMOL/L (ref 21–32)
CREAT SERPL-MCNC: 0.5 MG/DL (ref 0.6–1.2)
GFR AFRICAN AMERICAN: >60
GFR NON-AFRICAN AMERICAN: >60
GLOBULIN: 2.7 G/DL
GLUCOSE BLD-MCNC: 118 MG/DL (ref 70–99)
HDLC SERPL-MCNC: 38 MG/DL (ref 40–60)
LDL CHOLESTEROL CALCULATED: 96 MG/DL
POTASSIUM SERPL-SCNC: 4.4 MMOL/L (ref 3.5–5.1)
SODIUM BLD-SCNC: 138 MMOL/L (ref 136–145)
TOTAL PROTEIN: 7.6 G/DL (ref 6.4–8.2)
TRIGL SERPL-MCNC: 181 MG/DL (ref 0–150)
VLDLC SERPL CALC-MCNC: 36 MG/DL

## 2019-08-27 PROCEDURE — 99214 OFFICE O/P EST MOD 30 MIN: CPT | Performed by: FAMILY MEDICINE

## 2019-08-27 PROCEDURE — 4040F PNEUMOC VAC/ADMIN/RCVD: CPT | Performed by: FAMILY MEDICINE

## 2019-08-27 PROCEDURE — 36415 COLL VENOUS BLD VENIPUNCTURE: CPT | Performed by: FAMILY MEDICINE

## 2019-08-27 PROCEDURE — 1090F PRES/ABSN URINE INCON ASSESS: CPT | Performed by: FAMILY MEDICINE

## 2019-08-27 PROCEDURE — 1123F ACP DISCUSS/DSCN MKR DOCD: CPT | Performed by: FAMILY MEDICINE

## 2019-08-27 PROCEDURE — G8427 DOCREV CUR MEDS BY ELIG CLIN: HCPCS | Performed by: FAMILY MEDICINE

## 2019-08-27 PROCEDURE — G8417 CALC BMI ABV UP PARAM F/U: HCPCS | Performed by: FAMILY MEDICINE

## 2019-08-27 PROCEDURE — G8400 PT W/DXA NO RESULTS DOC: HCPCS | Performed by: FAMILY MEDICINE

## 2019-08-27 PROCEDURE — 1036F TOBACCO NON-USER: CPT | Performed by: FAMILY MEDICINE

## 2019-08-27 RX ORDER — DICLOFENAC SODIUM 75 MG/1
TABLET, DELAYED RELEASE ORAL
Qty: 180 TABLET | Refills: 1 | Status: CANCELLED | OUTPATIENT
Start: 2019-08-27

## 2019-08-27 RX ORDER — DICYCLOMINE HYDROCHLORIDE 10 MG/1
10 CAPSULE ORAL 4 TIMES DAILY PRN
Qty: 90 CAPSULE | Refills: 1 | Status: CANCELLED | OUTPATIENT
Start: 2019-08-27

## 2019-08-27 RX ORDER — FENOFIBRATE 145 MG/1
TABLET, COATED ORAL
Qty: 90 TABLET | Refills: 1 | Status: SHIPPED | OUTPATIENT
Start: 2019-08-27 | End: 2020-02-24

## 2019-08-27 RX ORDER — VALACYCLOVIR HYDROCHLORIDE 1 G/1
TABLET, FILM COATED ORAL
Qty: 20 TABLET | Refills: 2 | Status: CANCELLED | OUTPATIENT
Start: 2019-08-27

## 2019-08-27 RX ORDER — SIMVASTATIN 20 MG
TABLET ORAL
Qty: 90 TABLET | Refills: 1 | Status: SHIPPED | OUTPATIENT
Start: 2019-08-27 | End: 2020-02-24

## 2019-08-27 RX ORDER — ESCITALOPRAM OXALATE 20 MG/1
TABLET ORAL
Qty: 90 TABLET | Refills: 1 | Status: SHIPPED | OUTPATIENT
Start: 2019-08-27 | End: 2020-02-24

## 2019-08-27 RX ORDER — LOSARTAN POTASSIUM AND HYDROCHLOROTHIAZIDE 12.5; 1 MG/1; MG/1
TABLET ORAL
Qty: 90 TABLET | Refills: 1 | Status: SHIPPED | OUTPATIENT
Start: 2019-08-27 | End: 2020-02-24

## 2019-09-24 ENCOUNTER — OFFICE VISIT (OUTPATIENT)
Dept: ORTHOPEDIC SURGERY | Age: 79
End: 2019-09-24
Payer: MEDICARE

## 2019-09-24 VITALS
WEIGHT: 173.06 LBS | SYSTOLIC BLOOD PRESSURE: 145 MMHG | HEIGHT: 60 IN | DIASTOLIC BLOOD PRESSURE: 67 MMHG | HEART RATE: 76 BPM | BODY MASS INDEX: 33.98 KG/M2

## 2019-09-24 DIAGNOSIS — M51.36 DDD (DEGENERATIVE DISC DISEASE), LUMBAR: ICD-10-CM

## 2019-09-24 DIAGNOSIS — G89.4 CHRONIC PAIN SYNDROME: Primary | ICD-10-CM

## 2019-09-24 DIAGNOSIS — M48.061 SPINAL STENOSIS OF LUMBAR REGION, UNSPECIFIED WHETHER NEUROGENIC CLAUDICATION PRESENT: ICD-10-CM

## 2019-09-24 PROCEDURE — 1090F PRES/ABSN URINE INCON ASSESS: CPT | Performed by: PHYSICIAN ASSISTANT

## 2019-09-24 PROCEDURE — G8400 PT W/DXA NO RESULTS DOC: HCPCS | Performed by: PHYSICIAN ASSISTANT

## 2019-09-24 PROCEDURE — G8427 DOCREV CUR MEDS BY ELIG CLIN: HCPCS | Performed by: PHYSICIAN ASSISTANT

## 2019-09-24 PROCEDURE — 99213 OFFICE O/P EST LOW 20 MIN: CPT | Performed by: PHYSICIAN ASSISTANT

## 2019-09-24 PROCEDURE — 1123F ACP DISCUSS/DSCN MKR DOCD: CPT | Performed by: PHYSICIAN ASSISTANT

## 2019-09-24 PROCEDURE — 4040F PNEUMOC VAC/ADMIN/RCVD: CPT | Performed by: PHYSICIAN ASSISTANT

## 2019-09-24 PROCEDURE — 1036F TOBACCO NON-USER: CPT | Performed by: PHYSICIAN ASSISTANT

## 2019-09-24 PROCEDURE — G8417 CALC BMI ABV UP PARAM F/U: HCPCS | Performed by: PHYSICIAN ASSISTANT

## 2019-09-24 RX ORDER — HYDROCODONE BITARTRATE AND ACETAMINOPHEN 5; 325 MG/1; MG/1
1 TABLET ORAL DAILY PRN
Qty: 30 TABLET | Refills: 0 | Status: SHIPPED | OUTPATIENT
Start: 2019-11-22 | End: 2019-12-22

## 2019-09-24 RX ORDER — HYDROCODONE BITARTRATE AND ACETAMINOPHEN 5; 325 MG/1; MG/1
1 TABLET ORAL DAILY PRN
Qty: 30 TABLET | Refills: 0 | Status: SHIPPED | OUTPATIENT
Start: 2019-10-23 | End: 2019-11-22

## 2019-09-24 RX ORDER — HYDROCODONE BITARTRATE AND ACETAMINOPHEN 5; 325 MG/1; MG/1
1 TABLET ORAL DAILY PRN
Qty: 30 TABLET | Refills: 0 | Status: SHIPPED | OUTPATIENT
Start: 2019-09-24 | End: 2019-10-24

## 2019-09-24 NOTE — PROGRESS NOTES
are symmetrically trace with reinforcement at the patellar and ankle tendons. Clonus absent bilaterally at the feet. · Gait & station: Forward flexed unassisted   · Additional Examinations:   · RIGHT LOWER EXTREMITY: Inspection/examination of the right lower extremity does not show any tenderness, deformity or injury. Range of motion is full. There is no gross instability. There are no rashes, ulcerations or lesions. Strength and tone are normal.  · LEFT LOWER EXTREMITY:  Inspection/examination of the left lower extremity does not show any tenderness, deformity or injury. Range of motion is full. There is no gross instability. There are no rashes, ulcerations or lesions. Strength and tone are normal.    Diagnostic Testing:   UDS April 2, 2019 +MOP as expected     UDS February 28, 2018 negative for all (last dose 3 days prior)    ORT 5-2018=1    UDS 4-28-17 + for OPI as expected      UDS 4-8-16: Negative for all as expected, last dose was last week (taking PRN)      Lumbar radiographs 3-27-15 2 views AP/LAT: L4-5 spondy. Moderate DDD L4-5 & L5-S1      MRI 11/28/2011:  IMPRESSION-         1. Stable L4 and L5 left pedicle edema of uncertain etiology.         2. L4-L5 facet arthropathy, particularly on the left where there    is a left L4 and L5 nerve root compression.                     Impression:  1) Chronic right mechanical back pain, mild right radiculitis--stable    2) h/o lumbar decompression, Dr. Elizabeth Melton   3) H/o multiple interventional procedures, Dr. Briana Casanova  4) Right hip pain, OA, Dr. Kenneth Menjivar  5) Opioid maintenance, low risk per ORT =1  6) S/p R TSR, Dr. Earl Mesa          Plan:    1) Norco 5/325 I po qd PRN #30 x 3 scripts  2) F/u 3mo, sooner if needed     The risks and use of maintenance opiate medication were reviewed. These include the risk of tolerance, addition, and abuse. Potential side effects were also discussed.    Medications are to be taken as prescribed and not escalated without prior agreement. OARRS/NITA reviewed & appropriate. Opiate medications will only be prescribed through the office of JOAN Fritz unless notified otherwise    Goals of the current treatment regimen include: improvement in pain, improvement in overall in physical performance, ability to perform daily activities, work or disability, emotional distress, health care utilization and decreased medication consumption. Progress will be monitored towards achieving/maintaining therapeutic goals:    1. Improving perceived interference of pain with ADL's, ability to perform HEP, continue to improve in overall flexibility, ROM, strength and endurance, ability to do household activities indoor and/or outdoor, work and social/leisure activities. Improve psychosocial and physical functioning. 2. Improving sleep to 6-7 hours a night. Improve mood/ anxiety and depression symptoms    3. Reduction of reliance on opioid analgesia/more appropriate opioid use. Utilization of adjuvant medications as appropriate. Risks and benefits of the medications and alternative treatments have been discussed with the patient. The patient was advised against drinking alcohol with the opioid pain medicines, advised against driving or handling machinery when starting or adjusting the dose of medicines, feeling groggy or drowsy, or if having any cognitive issues related to the current medications. The patient is fully aware of the risk of overdose and death, if medicines are misused and not taken as prescribed. Discussed patient's responsibility to safely store and appropriately dispose up medication. Prescription for Narcan offered. If the patient develops new symptoms or if the symptoms worsen, the patient was told to call the office.     Controlled Substances Monitoring:     RX Monitoring 9/24/2019   Attestation -   Periodic Controlled Substance Monitoring Possible medication side effects, risk of tolerance/dependence &

## 2020-02-22 ENCOUNTER — TELEPHONE (OUTPATIENT)
Dept: FAMILY MEDICINE CLINIC | Age: 80
End: 2020-02-22

## 2020-02-22 DIAGNOSIS — E78.5 DYSLIPIDEMIA WITH HIGH LDL AND LOW HDL: ICD-10-CM

## 2020-02-22 DIAGNOSIS — I10 ESSENTIAL HYPERTENSION: Chronic | ICD-10-CM

## 2020-02-22 DIAGNOSIS — F32.89 OTHER DEPRESSION: Chronic | ICD-10-CM

## 2020-02-22 DIAGNOSIS — E78.1 HYPERTRIGLYCERIDEMIA: ICD-10-CM

## 2020-02-24 RX ORDER — SIMVASTATIN 20 MG
TABLET ORAL
Qty: 90 TABLET | Refills: 1 | Status: SHIPPED | OUTPATIENT
Start: 2020-02-24 | End: 2020-03-04

## 2020-02-24 RX ORDER — LOSARTAN POTASSIUM AND HYDROCHLOROTHIAZIDE 12.5; 1 MG/1; MG/1
TABLET ORAL
Qty: 90 TABLET | Refills: 1 | Status: SHIPPED | OUTPATIENT
Start: 2020-02-24 | End: 2020-08-20

## 2020-02-24 RX ORDER — ESCITALOPRAM OXALATE 20 MG/1
TABLET ORAL
Qty: 90 TABLET | Refills: 1 | Status: SHIPPED | OUTPATIENT
Start: 2020-02-24 | End: 2020-08-20

## 2020-02-24 RX ORDER — FENOFIBRATE 145 MG/1
TABLET, COATED ORAL
Qty: 90 TABLET | Refills: 0 | Status: SHIPPED | OUTPATIENT
Start: 2020-02-24 | End: 2020-03-04

## 2020-03-02 ENCOUNTER — OFFICE VISIT (OUTPATIENT)
Dept: ORTHOPEDIC SURGERY | Age: 80
End: 2020-03-02
Payer: MEDICARE

## 2020-03-02 VITALS
HEART RATE: 78 BPM | DIASTOLIC BLOOD PRESSURE: 75 MMHG | WEIGHT: 173.06 LBS | HEIGHT: 60 IN | SYSTOLIC BLOOD PRESSURE: 139 MMHG | BODY MASS INDEX: 33.98 KG/M2

## 2020-03-02 PROCEDURE — G8427 DOCREV CUR MEDS BY ELIG CLIN: HCPCS | Performed by: PHYSICIAN ASSISTANT

## 2020-03-02 PROCEDURE — G8417 CALC BMI ABV UP PARAM F/U: HCPCS | Performed by: PHYSICIAN ASSISTANT

## 2020-03-02 PROCEDURE — 4040F PNEUMOC VAC/ADMIN/RCVD: CPT | Performed by: PHYSICIAN ASSISTANT

## 2020-03-02 PROCEDURE — 1036F TOBACCO NON-USER: CPT | Performed by: PHYSICIAN ASSISTANT

## 2020-03-02 PROCEDURE — G8400 PT W/DXA NO RESULTS DOC: HCPCS | Performed by: PHYSICIAN ASSISTANT

## 2020-03-02 PROCEDURE — 1090F PRES/ABSN URINE INCON ASSESS: CPT | Performed by: PHYSICIAN ASSISTANT

## 2020-03-02 PROCEDURE — G8484 FLU IMMUNIZE NO ADMIN: HCPCS | Performed by: PHYSICIAN ASSISTANT

## 2020-03-02 PROCEDURE — 99214 OFFICE O/P EST MOD 30 MIN: CPT | Performed by: PHYSICIAN ASSISTANT

## 2020-03-02 PROCEDURE — 1123F ACP DISCUSS/DSCN MKR DOCD: CPT | Performed by: PHYSICIAN ASSISTANT

## 2020-03-02 RX ORDER — HYDROCODONE BITARTRATE AND ACETAMINOPHEN 5; 325 MG/1; MG/1
1 TABLET ORAL DAILY PRN
Qty: 30 TABLET | Refills: 0 | Status: SHIPPED | OUTPATIENT
Start: 2020-04-27 | End: 2020-03-04

## 2020-03-02 RX ORDER — HYDROCODONE BITARTRATE AND ACETAMINOPHEN 5; 325 MG/1; MG/1
1 TABLET ORAL DAILY PRN
Qty: 30 TABLET | Refills: 0 | Status: SHIPPED | OUTPATIENT
Start: 2020-03-02 | End: 2020-04-01

## 2020-03-02 RX ORDER — HYDROCODONE BITARTRATE AND ACETAMINOPHEN 5; 325 MG/1; MG/1
1 TABLET ORAL DAILY PRN
Qty: 30 TABLET | Refills: 0 | Status: SHIPPED | OUTPATIENT
Start: 2020-03-29 | End: 2020-03-04

## 2020-03-02 NOTE — PROGRESS NOTES
symmetrically trace with reinforcement at the patellar and ankle tendons. Clonus absent bilaterally at the feet. · Gait & station: Forward flexed unassisted   · Additional Examinations:   · RIGHT LOWER EXTREMITY: Inspection/examination of the right lower extremity does not show any tenderness, deformity or injury. Range of motion is full. There is no gross instability. There are no rashes, ulcerations or lesions. Strength and tone are normal.  · LEFT LOWER EXTREMITY:  Inspection/examination of the left lower extremity does not show any tenderness, deformity or injury. Range of motion is full. There is no gross instability. There are no rashes, ulcerations or lesions. Strength and tone are normal.    Diagnostic Testing:   UDS 3-2-2020 negative for all, taking PRN low dosing     UDS April 2, 2019 +MOP as expected     UDS February 28, 2018 negative for all (last dose 3 days prior)    ORT 5-2018=1    UDS 4-28-17 + for OPI as expected      UDS 4-8-16: Negative for all as expected, last dose was last week (taking PRN)      Lumbar radiographs 3-27-15 2 views AP/LAT: L4-5 spondy. Moderate DDD L4-5 & L5-S1      MRI 11/28/2011:  IMPRESSION-         1. Stable L4 and L5 left pedicle edema of uncertain etiology.         2.  L4-L5 facet arthropathy, particularly on the left where there    is a left L4 and L5 nerve root compression.                     Impression:  1) Chronic right mechanical back pain, mild right radiculitis--stable    2) h/o lumbar decompression, Dr. Colonel Ryan   3) H/o multiple interventional procedures, Dr. Makayla Rachel  4) Right hip pain, OA, Dr. Lo Kelly  5) Opioid maintenance, low risk per ORT =1  6) S/p R TSR, Dr. Jo-Ann Hernandez          Plan:    1) Norco 5/325 I po qd PRN #30 x 3 scripts  2) UDS today   3) F/u 3mo, sooner if needed     Controlled Substance Monitoring:    Acute and Chronic Pain Monitoring:   RX Monitoring 3/2/2020   Attestation -   Periodic Controlled Substance Monitoring Possible medication side feeling groggy or drowsy, or if having any cognitive issues related to the current medications. The patient is fully aware of the risk of overdose and death, if medicines are misused and not taken as prescribed. Discussed patient's responsibility to safely store and appropriately dispose up medication. Prescription for Narcan offered. If the patient develops new symptoms or if the symptoms worsen, the patient was told to call the office.               [facility]ara [facility]

## 2020-03-04 ENCOUNTER — OFFICE VISIT (OUTPATIENT)
Dept: FAMILY MEDICINE CLINIC | Age: 80
End: 2020-03-04
Payer: MEDICARE

## 2020-03-04 VITALS
SYSTOLIC BLOOD PRESSURE: 140 MMHG | BODY MASS INDEX: 35.14 KG/M2 | WEIGHT: 179 LBS | HEIGHT: 60 IN | OXYGEN SATURATION: 97 % | HEART RATE: 75 BPM | DIASTOLIC BLOOD PRESSURE: 80 MMHG

## 2020-03-04 LAB
A/G RATIO: 1.5 (ref 1.1–2.2)
ALBUMIN SERPL-MCNC: 4.5 G/DL (ref 3.4–5)
ALP BLD-CCNC: 44 U/L (ref 40–129)
ALT SERPL-CCNC: 15 U/L (ref 10–40)
ANION GAP SERPL CALCULATED.3IONS-SCNC: 16 MMOL/L (ref 3–16)
AST SERPL-CCNC: 14 U/L (ref 15–37)
BILIRUB SERPL-MCNC: <0.2 MG/DL (ref 0–1)
BUN BLDV-MCNC: 15 MG/DL (ref 7–20)
CALCIUM SERPL-MCNC: 9.8 MG/DL (ref 8.3–10.6)
CHLORIDE BLD-SCNC: 98 MMOL/L (ref 99–110)
CHOLESTEROL, TOTAL: 161 MG/DL (ref 0–199)
CO2: 27 MMOL/L (ref 21–32)
CREAT SERPL-MCNC: 0.6 MG/DL (ref 0.6–1.2)
GFR AFRICAN AMERICAN: >60
GFR NON-AFRICAN AMERICAN: >60
GLOBULIN: 3 G/DL
GLUCOSE BLD-MCNC: 151 MG/DL (ref 70–99)
HDLC SERPL-MCNC: 33 MG/DL (ref 40–60)
LDL CHOLESTEROL CALCULATED: 88 MG/DL
POTASSIUM SERPL-SCNC: 4.1 MMOL/L (ref 3.5–5.1)
SODIUM BLD-SCNC: 141 MMOL/L (ref 136–145)
TOTAL PROTEIN: 7.5 G/DL (ref 6.4–8.2)
TRIGL SERPL-MCNC: 201 MG/DL (ref 0–150)
TSH SERPL DL<=0.05 MIU/L-ACNC: 3.52 UIU/ML (ref 0.27–4.2)
VLDLC SERPL CALC-MCNC: 40 MG/DL

## 2020-03-04 PROCEDURE — G8400 PT W/DXA NO RESULTS DOC: HCPCS | Performed by: FAMILY MEDICINE

## 2020-03-04 PROCEDURE — 1036F TOBACCO NON-USER: CPT | Performed by: FAMILY MEDICINE

## 2020-03-04 PROCEDURE — 1123F ACP DISCUSS/DSCN MKR DOCD: CPT | Performed by: FAMILY MEDICINE

## 2020-03-04 PROCEDURE — 4040F PNEUMOC VAC/ADMIN/RCVD: CPT | Performed by: FAMILY MEDICINE

## 2020-03-04 PROCEDURE — 99214 OFFICE O/P EST MOD 30 MIN: CPT | Performed by: FAMILY MEDICINE

## 2020-03-04 PROCEDURE — G8427 DOCREV CUR MEDS BY ELIG CLIN: HCPCS | Performed by: FAMILY MEDICINE

## 2020-03-04 PROCEDURE — 1090F PRES/ABSN URINE INCON ASSESS: CPT | Performed by: FAMILY MEDICINE

## 2020-03-04 PROCEDURE — G8417 CALC BMI ABV UP PARAM F/U: HCPCS | Performed by: FAMILY MEDICINE

## 2020-03-04 PROCEDURE — 36415 COLL VENOUS BLD VENIPUNCTURE: CPT | Performed by: FAMILY MEDICINE

## 2020-03-04 PROCEDURE — G8484 FLU IMMUNIZE NO ADMIN: HCPCS | Performed by: FAMILY MEDICINE

## 2020-03-04 RX ORDER — COLLAGEN, HYDROLYSATE (BOVINE) 100 %
POWDER (GRAM) MISCELLANEOUS
COMMUNITY

## 2020-03-04 RX ORDER — SIMVASTATIN 40 MG
TABLET ORAL
Qty: 90 TABLET | Refills: 0
Start: 2020-03-04 | End: 2020-03-10 | Stop reason: SDUPTHER

## 2020-03-04 NOTE — PROGRESS NOTES
100-12.5 MG per tablet TAKE ONE TABLET BY MOUTH DAILY 90 tablet 1    escitalopram (LEXAPRO) 20 MG tablet TAKE ONE TABLET BY MOUTH DAILY 90 tablet 1    Omega-3 Fatty Acids (FISH OIL) 875 MG CAPS Take by mouth      dicyclomine (BENTYL) 10 MG capsule Take 1 capsule by mouth 4 times daily as needed (cramping) 90 capsule 0    valACYclovir (VALTREX) 1 g tablet TAKE TWO TABLETS BY MOUTH AT THE ONSET OF COLD SORE THEN REPEAT DOSE IN 12 HOURS 20 tablet 2     No current facility-administered medications for this visit. Allergies   Allergen Reactions    Diovan [Valsartan] Anaphylaxis     Throat swelling      Bee Venom     Formaldehyde Itching    Iodine Hives     IVP dye    Lisinopril      Throat swelling  Angioedema      Niaspan [Niacin Er]      facial rash and itching    Amoxicillin Rash, Hives and Itching       Social History     Tobacco Use    Smoking status: Former Smoker     Packs/day: 1.00     Years: 50.00     Pack years: 50.00     Types: Cigarettes     Last attempt to quit: 2017     Years since quittin.8    Smokeless tobacco: Never Used    Tobacco comment: 16- using chantix again as starting to sneak a few cigarettes. quit again with Chantix in . smoking again 19. \"quit\" 19, but still occ cigarette   Substance Use Topics    Alcohol use: No     Alcohol/week: 0.0 standard drinks       OBJECTIVE:   BP (!) 140/80   Pulse 75   Ht 5' (1.524 m)   Wt 179 lb (81.2 kg)   SpO2 97%   BMI 34.96 kg/m²   NAD  Neck no bruit or JVD  S1 and S2 normal, 1/6 murmurs, clicks, gallops or rubs. Regular rate and rhythm. Chest is clear; no wheezes or rales. No edema or JVD. Neuro alert, no CN or motor deficits  Psych nl mood, thought and judgement    ASSESSMENT:   Diagnosis Orders   1. Essential hypertension, continue same medicine Comprehensive Metabolic Panel   2. Dyslipidemia with high LDL and low HDL, we will discontinue her fenofibrate.   We will increase the dose of her

## 2020-03-05 LAB
ESTIMATED AVERAGE GLUCOSE: 203 MG/DL
HBA1C MFR BLD: 8.7 %

## 2020-03-06 ENCOUNTER — TELEPHONE (OUTPATIENT)
Dept: FAMILY MEDICINE CLINIC | Age: 80
End: 2020-03-06

## 2020-03-06 NOTE — TELEPHONE ENCOUNTER
Patient returned call. I read physician notes, she said \"you got to be kidding! I cannot afford to be a diabetic! \", she had no questions and made an appointment for next Tuesday.

## 2020-03-10 ENCOUNTER — OFFICE VISIT (OUTPATIENT)
Dept: FAMILY MEDICINE CLINIC | Age: 80
End: 2020-03-10
Payer: MEDICARE

## 2020-03-10 VITALS
WEIGHT: 179 LBS | HEART RATE: 74 BPM | SYSTOLIC BLOOD PRESSURE: 112 MMHG | OXYGEN SATURATION: 97 % | DIASTOLIC BLOOD PRESSURE: 62 MMHG | HEIGHT: 60 IN | BODY MASS INDEX: 35.14 KG/M2

## 2020-03-10 PROCEDURE — 1090F PRES/ABSN URINE INCON ASSESS: CPT | Performed by: FAMILY MEDICINE

## 2020-03-10 PROCEDURE — 1123F ACP DISCUSS/DSCN MKR DOCD: CPT | Performed by: FAMILY MEDICINE

## 2020-03-10 PROCEDURE — 1036F TOBACCO NON-USER: CPT | Performed by: FAMILY MEDICINE

## 2020-03-10 PROCEDURE — G8400 PT W/DXA NO RESULTS DOC: HCPCS | Performed by: FAMILY MEDICINE

## 2020-03-10 PROCEDURE — 3052F HG A1C>EQUAL 8.0%<EQUAL 9.0%: CPT | Performed by: FAMILY MEDICINE

## 2020-03-10 PROCEDURE — G8417 CALC BMI ABV UP PARAM F/U: HCPCS | Performed by: FAMILY MEDICINE

## 2020-03-10 PROCEDURE — 4040F PNEUMOC VAC/ADMIN/RCVD: CPT | Performed by: FAMILY MEDICINE

## 2020-03-10 PROCEDURE — G8427 DOCREV CUR MEDS BY ELIG CLIN: HCPCS | Performed by: FAMILY MEDICINE

## 2020-03-10 PROCEDURE — G8484 FLU IMMUNIZE NO ADMIN: HCPCS | Performed by: FAMILY MEDICINE

## 2020-03-10 PROCEDURE — 99214 OFFICE O/P EST MOD 30 MIN: CPT | Performed by: FAMILY MEDICINE

## 2020-03-10 RX ORDER — SIMVASTATIN 40 MG
TABLET ORAL
Qty: 90 TABLET | Refills: 1 | Status: SHIPPED | OUTPATIENT
Start: 2020-03-10 | End: 2020-08-20

## 2020-03-10 NOTE — PROGRESS NOTES
Patient:  Rimma Smith a 70 y.o. femalewho presents today with the following Chief Complaint(s):  Chief Complaint   Patient presents with    Diabetes     Patient is here to discuss labs        Patient was seen last week for regular check, but was complaining of extreme fatigue. She says many years ago she had a blood sugar of 600 but then changed her diet and was able to beat it. Viewing her past fasting glucose numbers, 6 months ago was 118. Prior to that numbers had been fine. She says she was not surprised to hear that she was diabetic. She has had some blurry vision and some polyuria and polydipsia. Current Outpatient Medications   Medication Sig Dispense Refill    metFORMIN (GLUCOPHAGE) 500 MG tablet Take 1 tablet by mouth 2 times daily (with meals) 60 tablet 3    simvastatin (ZOCOR) 40 MG tablet 1 po daily 90 tablet 1    Collagen Hydrolysate POWD by Does not apply route      HYDROcodone-acetaminophen (NORCO) 5-325 MG per tablet Take 1 tablet by mouth daily as needed for Pain for up to 30 days. 30 tablet 0    losartan-hydrochlorothiazide (HYZAAR) 100-12.5 MG per tablet TAKE ONE TABLET BY MOUTH DAILY 90 tablet 1    escitalopram (LEXAPRO) 20 MG tablet TAKE ONE TABLET BY MOUTH DAILY 90 tablet 1    Omega-3 Fatty Acids (FISH OIL) 875 MG CAPS Take by mouth      dicyclomine (BENTYL) 10 MG capsule Take 1 capsule by mouth 4 times daily as needed (cramping) 90 capsule 0    valACYclovir (VALTREX) 1 g tablet TAKE TWO TABLETS BY MOUTH AT THE ONSET OF COLD SORE THEN REPEAT DOSE IN 12 HOURS 20 tablet 2     No current facility-administered medications for this visit. Patients past medical history, surgical history, family history, medications and allergies were all reviewed and updated as appropriate today. Review of Systems   Constitutional: Positive for fatigue. Eyes: Positive for visual disturbance. Endocrine: Positive for polydipsia and polyuria.         Physical Exam  Vitals

## 2020-03-13 ENCOUNTER — OFFICE VISIT (OUTPATIENT)
Dept: ENDOCRINOLOGY | Age: 80
End: 2020-03-13
Payer: MEDICARE

## 2020-03-13 PROCEDURE — 97802 MEDICAL NUTRITION INDIV IN: CPT | Performed by: DIETITIAN, REGISTERED

## 2020-03-13 NOTE — PROGRESS NOTES
HDL 33 (L) 03/04/2020    HDL 38 (L) 08/27/2019    HDL 35 (L) 02/13/2019     Lab Results   Component Value Date    LDLCALC 88 03/04/2020    LDLCALC 96 08/27/2019    LDLCALC 127 (H) 02/13/2019     Lab Results   Component Value Date    LABVLDL 40 03/04/2020    LABVLDL 36 08/27/2019    LABVLDL 45 02/13/2019     Lab Results   Component Value Date    CHOLHDLRATIO 5.2 (H) 01/21/2016    CHOLHDLRATIO 4.4 10/03/2014    CHOLHDLRATIO 4.0 01/29/2014       Lab Results   Component Value Date    WBC 9.4 02/13/2019    HGB 14.0 02/13/2019    HCT 42.4 02/13/2019    MCV 87.5 02/13/2019     02/13/2019       Lab Results   Component Value Date    CREATININE 0.6 03/04/2020    BUN 15 03/04/2020     03/04/2020    K 4.1 03/04/2020    CL 98 (L) 03/04/2020    CO2 27 03/04/2020       Diabetes Medications: Yes  Knows name and dose of prescribed medications Yes  Knows prescribed schedule for medicationsYes  Recent change in medication type/dosage: Yes  Stores  medications properlyYes  Comments:     Monitoring:   Has BG meter: No  Testing frequency:   Recent results:   Hypoglycemia? Anthropometric Measurements:   Wt:   Wt Readings from Last 3 Encounters:   03/10/20 179 lb (81.2 kg)   03/04/20 179 lb (81.2 kg)   03/02/20 173 lb 1 oz (78.5 kg)      BMI:   BMI Readings from Last 3 Encounters:   03/10/20 34.96 kg/m²   03/04/20 34.96 kg/m²   03/02/20 33.80 kg/m²     Patient's stated goal weight:   7% Weight loss goal weight:     Physical Activity History:   Physical activity: nothing before now working out at the gym  Frequency of activity:  3x/wk  Duration of activity:   Obstacles to activity:     Sleep: poor, snores, cats interrupt sleep, difficulty getting to sleep    Food and Nutrition History:   Nutrition Awareness/Previous DSMES: no  Beverage consumption: water 32 ounces  Alcohol consumption: yes, rarely  Frequency of Meals Eaten away from home:4-5 times / month  Food Availability Problems  Within the past 12 months, have you worried that your food would run out before you got money to buy more? No  Within the past 12 months, has the food you bought not lasted till the end of the month and you didn't have money to get more? No  Number of people in household: 1  Usual Food consumption:   2 meals and multiple snacks     Barriers:   -no standard lifestyle schedule  Patient reveals a time when she was not able to have much food. She buys large amounts as a response to  This past deficit. Follow Up Plan: Will remain available. Contact information provided.     Referring Provider: Kate Romo MD  Time spent with patient: 60 minutes

## 2020-05-26 RX ORDER — FENOFIBRATE 145 MG/1
TABLET, COATED ORAL
Qty: 46 TABLET | Refills: 0 | OUTPATIENT
Start: 2020-05-26

## 2020-06-05 RX ORDER — VALACYCLOVIR HYDROCHLORIDE 1 G/1
TABLET, FILM COATED ORAL
Qty: 20 TABLET | Refills: 1 | Status: SHIPPED | OUTPATIENT
Start: 2020-06-05 | End: 2021-06-30 | Stop reason: SDUPTHER

## 2020-06-05 NOTE — TELEPHONE ENCOUNTER
Jane Tucker is requesting refill(s) valtrex  Last OV 3/10/20 (pertaining to medication)  LR 5/29/19 (per medication requested)  Next office visit scheduled or attempted Yes   If no, reason:  6/15/20

## 2020-06-09 ENCOUNTER — VIRTUAL VISIT (OUTPATIENT)
Dept: ORTHOPEDIC SURGERY | Age: 80
End: 2020-06-09
Payer: MEDICARE

## 2020-06-09 PROCEDURE — 99442 PR PHYS/QHP TELEPHONE EVALUATION 11-20 MIN: CPT | Performed by: PHYSICIAN ASSISTANT

## 2020-06-09 RX ORDER — HYDROCODONE BITARTRATE AND ACETAMINOPHEN 5; 325 MG/1; MG/1
1 TABLET ORAL DAILY PRN
Qty: 30 TABLET | Refills: 0 | Status: SHIPPED | OUTPATIENT
Start: 2020-07-28 | End: 2020-08-27

## 2020-06-09 RX ORDER — HYDROCODONE BITARTRATE AND ACETAMINOPHEN 5; 325 MG/1; MG/1
1 TABLET ORAL DAILY PRN
Qty: 30 TABLET | Refills: 0 | Status: SHIPPED | OUTPATIENT
Start: 2020-06-30 | End: 2020-07-30

## 2020-06-09 RX ORDER — HYDROCODONE BITARTRATE AND ACETAMINOPHEN 5; 325 MG/1; MG/1
1 TABLET ORAL DAILY PRN
Qty: 30 TABLET | Refills: 0 | Status: SHIPPED | OUTPATIENT
Start: 2020-08-26 | End: 2020-09-25
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
## 2020-06-15 ENCOUNTER — OFFICE VISIT (OUTPATIENT)
Dept: FAMILY MEDICINE CLINIC | Age: 80
End: 2020-06-15
Payer: MEDICARE

## 2020-06-15 VITALS
HEART RATE: 80 BPM | OXYGEN SATURATION: 97 % | DIASTOLIC BLOOD PRESSURE: 70 MMHG | TEMPERATURE: 97.7 F | WEIGHT: 172.6 LBS | SYSTOLIC BLOOD PRESSURE: 132 MMHG | BODY MASS INDEX: 33.89 KG/M2 | HEIGHT: 60 IN

## 2020-06-15 LAB — HBA1C MFR BLD: 7.2 %

## 2020-06-15 PROCEDURE — G8400 PT W/DXA NO RESULTS DOC: HCPCS | Performed by: FAMILY MEDICINE

## 2020-06-15 PROCEDURE — 1090F PRES/ABSN URINE INCON ASSESS: CPT | Performed by: FAMILY MEDICINE

## 2020-06-15 PROCEDURE — 4040F PNEUMOC VAC/ADMIN/RCVD: CPT | Performed by: FAMILY MEDICINE

## 2020-06-15 PROCEDURE — 83036 HEMOGLOBIN GLYCOSYLATED A1C: CPT | Performed by: FAMILY MEDICINE

## 2020-06-15 PROCEDURE — G8417 CALC BMI ABV UP PARAM F/U: HCPCS | Performed by: FAMILY MEDICINE

## 2020-06-15 PROCEDURE — 1123F ACP DISCUSS/DSCN MKR DOCD: CPT | Performed by: FAMILY MEDICINE

## 2020-06-15 PROCEDURE — 3051F HG A1C>EQUAL 7.0%<8.0%: CPT | Performed by: FAMILY MEDICINE

## 2020-06-15 PROCEDURE — G0438 PPPS, INITIAL VISIT: HCPCS | Performed by: FAMILY MEDICINE

## 2020-06-15 PROCEDURE — 1036F TOBACCO NON-USER: CPT | Performed by: FAMILY MEDICINE

## 2020-06-15 PROCEDURE — G8427 DOCREV CUR MEDS BY ELIG CLIN: HCPCS | Performed by: FAMILY MEDICINE

## 2020-06-15 PROCEDURE — 99214 OFFICE O/P EST MOD 30 MIN: CPT | Performed by: FAMILY MEDICINE

## 2020-06-15 RX ORDER — FENOFIBRATE 145 MG/1
TABLET, COATED ORAL
COMMUNITY
Start: 2020-05-21 | End: 2020-06-15

## 2020-06-15 ASSESSMENT — LIFESTYLE VARIABLES: HOW OFTEN DO YOU HAVE A DRINK CONTAINING ALCOHOL: 0

## 2020-06-15 ASSESSMENT — PATIENT HEALTH QUESTIONNAIRE - PHQ9
SUM OF ALL RESPONSES TO PHQ QUESTIONS 1-9: 0
SUM OF ALL RESPONSES TO PHQ QUESTIONS 1-9: 0

## 2020-06-15 NOTE — PROGRESS NOTES
activity:  pt agrees to move more  · Nutritional issues:  pt eats small meals  · Dental exam overdue:  patient encouraged to make appointment with his/her dentist    Safety:  Safety  Do you have working smoke detectors?: Yes  Have all throw rugs been removed or fastened?: Yes  Do you have non-slip mats or surfaces in all bathtubs/showers?: (!) No  Do all of your stairways have a railing or banister?: Yes  Are your doorways, halls and stairs free of clutter?: (!) No  Do you always fasten your seatbelt when you are in a car?: Yes  Safety Interventions:  · Home safety tips provided    Personalized Preventive Plan   Current Health Maintenance Status  Immunization History   Administered Date(s) Administered    Hepatitis A 08/07/2007, 02/13/2008    Hepatitis B 08/07/2007, 12/13/2007, 02/13/2008    Influenza Vaccine, unspecified formulation 10/18/2017    Influenza Virus Vaccine 11/16/2011, 11/01/2012, 11/04/2014, 10/20/2016    Pneumococcal Conjugate 13-valent ([id number]) 04/29/2016    Pneumococcal Polysaccharide ([id number]) 02/17/2012    Td, unspecified formulation 08/07/2007    Zoster Live (Zostavax) 04/18/2007    Zoster Recombinant (Shingrix) 02/25/2020        Health Maintenance   Topic Date Due    DTaP/Tdap/Td vaccine (1 - Tdap) [birth year]/1961    Annual Wellness Visit (AWV)  05/29/2019    Shingles Vaccine (3 of 3) 04/21/2020    Lipid screen  03/04/2021    Potassium monitoring  03/04/2021    Creatinine monitoring  03/04/2021    DEXA (modify frequency per FRAX score)  Completed    Flu vaccine  Completed    Pneumococcal 65+ years Vaccine  Completed    Hepatitis A vaccine  Aged Out    Hib vaccine  Aged Out    Meningococcal (ACWY) vaccine  Aged Out     Recommendations for [employer] Due: see orders and patient instructions/AVS.  .   Recommended screening schedule for the next 5-10 years is provided to the patient in written form: see Patient Easton Joiner was seen today for medicare

## 2020-06-15 NOTE — PATIENT INSTRUCTIONS
Personalized Preventive Plan for Clement Connelly - 6/15/2020  Medicare offers a range of preventive health benefits. Some of the tests and screenings are paid in full while other may be subject to a deductible, co-insurance, and/or copay. Some of these benefits include a comprehensive review of your medical history including lifestyle, illnesses that may run in your family, and various assessments and screenings as appropriate. After reviewing your medical record and screening and assessments performed today your provider may have ordered immunizations, labs, imaging, and/or referrals for you. A list of these orders (if applicable) as well as your Preventive Care list are included within your After Visit Summary for your review. Other Preventive Recommendations:    · A preventive eye exam performed by an eye specialist is recommended every 1-2 years to screen for glaucoma; cataracts, macular degeneration, and other eye disorders. · A preventive dental visit is recommended every 6 months. · Try to get at least 150 minutes of exercise per week or 10,000 steps per day on a pedometer . · Order or download the FREE \"Exercise & Physical Activity: Your Everyday Guide\" from [employer]. Call 5-178.767.6817 or search [employer] online. · You need 8590-7284 mg of calcium and 8762-5716 IU of vitamin D per day. It is possible to meet your calcium requirement with diet alone, but a vitamin D supplement is usually necessary to meet this goal.  · When exposed to the sun, use a sunscreen that protects against both UVA and UVB radiation with an SPF of 30 or greater. Reapply every 2 to 3 hours or after sweating, drying off with a towel, or swimming. · Always wear a seat belt when traveling in a car. Always wear a helmet when riding a bicycle or motorcycle.

## 2020-08-20 RX ORDER — ESCITALOPRAM OXALATE 20 MG/1
TABLET ORAL
Qty: 90 TABLET | Refills: 1 | Status: SHIPPED | OUTPATIENT
Start: 2020-08-20 | End: 2021-02-16

## 2020-08-20 RX ORDER — SIMVASTATIN 40 MG
TABLET ORAL
Qty: 90 TABLET | Refills: 1 | Status: SHIPPED | OUTPATIENT
Start: 2020-08-20 | End: 2021-02-16

## 2020-08-20 RX ORDER — LOSARTAN POTASSIUM AND HYDROCHLOROTHIAZIDE 12.5; 1 MG/1; MG/1
TABLET ORAL
Qty: 90 TABLET | Refills: 1 | Status: SHIPPED | OUTPATIENT
Start: 2020-08-20 | End: 2021-02-16

## 2020-08-20 NOTE — TELEPHONE ENCOUNTER
Melissa Casper [phone] (home)    is requesting refill(s) of medication [facility] 6/15/20 (pertaining to medication)   Last refill 2/24/20 (per medication requested)  Next office visit scheduled or attempted Yes  Date 9/15/20  If No, reason     Simvastatin-3/10/20  Losartan-Hydrochlorothiazide-2/24/20

## 2020-09-15 ENCOUNTER — OFFICE VISIT (OUTPATIENT)
Dept: FAMILY MEDICINE CLINIC | Age: 80
End: 2020-09-15
Payer: MEDICARE

## 2020-09-15 VITALS
DIASTOLIC BLOOD PRESSURE: 66 MMHG | SYSTOLIC BLOOD PRESSURE: 124 MMHG | OXYGEN SATURATION: 96 % | WEIGHT: 166.8 LBS | HEART RATE: 82 BPM | TEMPERATURE: 97.2 F | BODY MASS INDEX: 32.58 KG/M2

## 2020-09-15 LAB
A/G RATIO: 1.7 (ref 1.1–2.2)
ALBUMIN SERPL-MCNC: 4.7 G/DL (ref 3.4–5)
ALP BLD-CCNC: 57 U/L (ref 40–129)
ALT SERPL-CCNC: 22 U/L (ref 10–40)
ANION GAP SERPL CALCULATED.3IONS-SCNC: 14 MMOL/L (ref 3–16)
AST SERPL-CCNC: 20 U/L (ref 15–37)
BASOPHILS ABSOLUTE: 0.1 K/UL (ref 0–0.2)
BASOPHILS RELATIVE PERCENT: 0.5 %
BILIRUB SERPL-MCNC: 0.3 MG/DL (ref 0–1)
BUN BLDV-MCNC: 13 MG/DL (ref 7–20)
CALCIUM SERPL-MCNC: 10 MG/DL (ref 8.3–10.6)
CHLORIDE BLD-SCNC: 97 MMOL/L (ref 99–110)
CHOLESTEROL, TOTAL: 146 MG/DL (ref 0–199)
CO2: 24 MMOL/L (ref 21–32)
CREAT SERPL-MCNC: <0.5 MG/DL (ref 0.6–1.2)
EOSINOPHILS ABSOLUTE: 0.2 K/UL (ref 0–0.6)
EOSINOPHILS RELATIVE PERCENT: 1.7 %
GFR AFRICAN AMERICAN: >60
GFR NON-AFRICAN AMERICAN: >60
GLOBULIN: 2.8 G/DL
GLUCOSE BLD-MCNC: 94 MG/DL (ref 70–99)
HBA1C MFR BLD: 7 %
HCT VFR BLD CALC: 38.8 % (ref 36–48)
HDLC SERPL-MCNC: 33 MG/DL (ref 40–60)
HEMOGLOBIN: 13.8 G/DL (ref 12–16)
LDL CHOLESTEROL CALCULATED: 72 MG/DL
LYMPHOCYTES ABSOLUTE: 3.1 K/UL (ref 1–5.1)
LYMPHOCYTES RELATIVE PERCENT: 28.3 %
MCH RBC QN AUTO: 30.7 PG (ref 26–34)
MCHC RBC AUTO-ENTMCNC: 35.6 G/DL (ref 31–36)
MCV RBC AUTO: 86.3 FL (ref 80–100)
MONOCYTES ABSOLUTE: 0.8 K/UL (ref 0–1.3)
MONOCYTES RELATIVE PERCENT: 7 %
NEUTROPHILS ABSOLUTE: 6.8 K/UL (ref 1.7–7.7)
NEUTROPHILS RELATIVE PERCENT: 62.5 %
PDW BLD-RTO: 13.8 % (ref 12.4–15.4)
PLATELET # BLD: 339 K/UL (ref 135–450)
PMV BLD AUTO: 8.2 FL (ref 5–10.5)
POTASSIUM SERPL-SCNC: 4.1 MMOL/L (ref 3.5–5.1)
RBC # BLD: 4.49 M/UL (ref 4–5.2)
SODIUM BLD-SCNC: 135 MMOL/L (ref 136–145)
TOTAL PROTEIN: 7.5 G/DL (ref 6.4–8.2)
TRIGL SERPL-MCNC: 207 MG/DL (ref 0–150)
VLDLC SERPL CALC-MCNC: 41 MG/DL
WBC # BLD: 10.9 K/UL (ref 4–11)

## 2020-09-15 PROCEDURE — G0008 ADMIN INFLUENZA VIRUS VAC: HCPCS | Performed by: FAMILY MEDICINE

## 2020-09-15 PROCEDURE — 4040F PNEUMOC VAC/ADMIN/RCVD: CPT | Performed by: FAMILY MEDICINE

## 2020-09-15 PROCEDURE — G8400 PT W/DXA NO RESULTS DOC: HCPCS | Performed by: FAMILY MEDICINE

## 2020-09-15 PROCEDURE — 83036 HEMOGLOBIN GLYCOSYLATED A1C: CPT | Performed by: FAMILY MEDICINE

## 2020-09-15 PROCEDURE — 36415 COLL VENOUS BLD VENIPUNCTURE: CPT | Performed by: FAMILY MEDICINE

## 2020-09-15 PROCEDURE — 99215 OFFICE O/P EST HI 40 MIN: CPT | Performed by: FAMILY MEDICINE

## 2020-09-15 PROCEDURE — 90694 VACC AIIV4 NO PRSRV 0.5ML IM: CPT | Performed by: FAMILY MEDICINE

## 2020-09-15 PROCEDURE — 1036F TOBACCO NON-USER: CPT | Performed by: FAMILY MEDICINE

## 2020-09-15 PROCEDURE — 1123F ACP DISCUSS/DSCN MKR DOCD: CPT | Performed by: FAMILY MEDICINE

## 2020-09-15 PROCEDURE — G8417 CALC BMI ABV UP PARAM F/U: HCPCS | Performed by: FAMILY MEDICINE

## 2020-09-15 PROCEDURE — G8427 DOCREV CUR MEDS BY ELIG CLIN: HCPCS | Performed by: FAMILY MEDICINE

## 2020-09-15 PROCEDURE — 3051F HG A1C>EQUAL 7.0%<8.0%: CPT | Performed by: FAMILY MEDICINE

## 2020-09-15 PROCEDURE — 1090F PRES/ABSN URINE INCON ASSESS: CPT | Performed by: FAMILY MEDICINE

## 2020-09-15 RX ORDER — VARENICLINE TARTRATE
KIT
Qty: 1 BOX | Refills: 0 | Status: SHIPPED | OUTPATIENT
Start: 2020-09-15 | End: 2020-12-15

## 2020-09-15 RX ORDER — OMEPRAZOLE 20 MG/1
20 CAPSULE, DELAYED RELEASE ORAL DAILY
Qty: 30 CAPSULE | Refills: 3 | Status: SHIPPED | OUTPATIENT
Start: 2020-09-15 | End: 2022-04-18 | Stop reason: ALTCHOICE

## 2020-09-15 RX ORDER — MECLIZINE HYDROCHLORIDE 25 MG/1
25 TABLET ORAL 3 TIMES DAILY PRN
COMMUNITY
End: 2020-09-15 | Stop reason: SDUPTHER

## 2020-09-15 RX ORDER — MECLIZINE HYDROCHLORIDE 25 MG/1
25 TABLET ORAL 3 TIMES DAILY PRN
Qty: 90 TABLET | Refills: 1 | Status: SHIPPED | OUTPATIENT
Start: 2020-09-15 | End: 2021-03-17

## 2020-09-15 RX ORDER — MECLIZINE HCL 12.5 MG/1
12.5 TABLET ORAL 3 TIMES DAILY PRN
COMMUNITY
End: 2020-09-15

## 2020-09-15 RX ORDER — VARENICLINE TARTRATE
KIT
Qty: 1 BOX | Refills: 0 | Status: CANCELLED | OUTPATIENT
Start: 2020-09-15

## 2020-09-15 NOTE — PROGRESS NOTES
SUBJECTIVE:  Josh Estrada is a 68 y.o. female who presents for evaluation of hypertension, dysthymia, tobacco use, Diabetes Mellitus and hyperlipidemia. She indicates that she is feeling well and denies any symptoms referable to her elevated blood pressure, diabetes or hyperlipidemia. Specifically denies chest pain, , dyspnea, orthopnea, PND,peripheral edema , or neuro sx. No anorexia, or leg cramps noted. No episodes of hypoglycemia. Current medication regimen is as listed below. She denies any side effects of medication, and has been taking it regularly. Patient was last seen 3 months ago. At that time her A1c was 7.2. She is maintained on metformin 500 mg twice a day. She continues to take Lexapro for her dysthymia. She has been under more stress, 3 weeks ago her best friend  of cancer. Patient complains of a pain in her epigastric area right at her GE junction. She is a longtime smoker. She has a history of colitis. She is a patient of Dr. Tyree Gauthier. She has not seen him in quite some time. Patient denies any dysphasia or melena or hematochezia. She does have some nausea and intermittent vomiting. She has not tried any medication. She has noted that she has been more tired lately no shortness of breath    Blood Pressure:   Blood pressures are not being checked at home. Ranges have been :     Diabetes: The last A1C: 7.2. Diabetic complications are: none. The Blood sugars range at home have been: [address] Last Eye Exam : .    The last microalbumin:    Lab Results   Component Value Date    LABMICR YES 2013         she is not taking aspirin. Social History     Tobacco Use   Smoking Status Former Smoker    Packs/day: 1.00    Years: 50.00    Pack years: 50.00    Types: Cigarettes    Last attempt to quit: 2017    Years since quitting: 3.3   Smokeless Tobacco Never Used   Tobacco Comment    14- using chantix again as starting to sneak a few cigarettes.  quit again without complication, without long-term current use of insulin (HCC)   continue metformin 500 mg twice a day. Patient was given parameters. Fasting her blood sugar ideally should be closer to 100, 2 hours postprandial closer to 140. She will monitor on a once a day basis alternating fasting and 2-hour postprandial   2. POCT glycosylated hemoglobin (Hb A1C)   3. Essential hypertension, stable Comprehensive Metabolic Panel        5. Dysthymia, stable on Lexapro    6. Tobacco use, patient wants to restart Chantix. Starter kit was sent    7. Dyslipidemia with high LDL and low HDL, labs pending Lipid Panel   8. Needs flu shot  INFLUENZA, QUADV, ADJUVANTED, 65 YRS =, IM, PF, PREFILL SYR, 0.5ML (FLUAD)   9. Fatigue, unspecified type  CBC Auto Differential   10     epigastric pain, Prilosec 20 mg 1 a day, referral to Dr. Isaiah Murdock. Patient was given the phone number and address to call and schedule. At this time I am concerned about a malignancy with her recent weight loss abdominal pain and longtime smoking history          Plan:  1)  Medication: continue current medication regimen unchanged  2)  Recheck in 3 months, sooner should new symptoms or problems arise. 3) LLR  Marianela received counseling on the following healthy behaviors: medication adherence    Patient given educational materials on Diabetes    I have instructed Laurel Ny to complete a self tracking handout on Blood Sugars  and instructed them to bring it with them to her next appointment. Discussed use, benefit, and side effects of prescribed medications. Barriers to medication compliance addressed. All patient questions answered. Pt voiced understanding. I spent a total of 40* minutes face to face with this patient, over 50% of that time was spent on counseling and care coordination. Please see assessment and plan for counseling and care coordination details. Carol Todd M.D.

## 2020-09-15 NOTE — PROGRESS NOTES
Vaccine Information Sheet, \"Influenza - Inactivated\"  given to Donnell Bales, or parent/legal guardian of  Donnell Bales and verbalized understanding. Patient responses:    Have you ever had a reaction to a flu vaccine? No  Do you have any current illness? No  Have you ever had Guillian Preston Syndrome? No  Do you have a serious allergy to any of the follow: Neomycin, Polymyxin, Thimerosal, eggs or egg products? No    Flu vaccine given per order. Please see immunization tab. Risks and benefits explained. Current VIS given.       Immunizations Administered     Name Date Dose Route    Influenza, Quadv, adjuvanted, 65 yrs +, IM, PF (Fluad) 9/15/2020 0.5 mL Intramuscular    Site: Deltoid- Left    Lot: [id number]    NDC: 14538-533-82

## 2020-09-24 LAB
DIABETIC RETINOPATHY: NEGATIVE
DIABETIC RETINOPATHY: NEGATIVE

## 2020-10-06 ENCOUNTER — TELEPHONE (OUTPATIENT)
Dept: ORTHOPEDIC SURGERY | Age: 80
End: 2020-10-06

## 2020-10-08 ENCOUNTER — TELEPHONE (OUTPATIENT)
Dept: ORTHOPEDIC SURGERY | Age: 80
End: 2020-10-08

## 2020-10-08 ENCOUNTER — VIRTUAL VISIT (OUTPATIENT)
Dept: ORTHOPEDIC SURGERY | Age: 80
End: 2020-10-08
Payer: MEDICARE

## 2020-10-08 PROCEDURE — 1036F TOBACCO NON-USER: CPT | Performed by: PHYSICIAN ASSISTANT

## 2020-10-08 PROCEDURE — G8427 DOCREV CUR MEDS BY ELIG CLIN: HCPCS | Performed by: PHYSICIAN ASSISTANT

## 2020-10-08 PROCEDURE — 1090F PRES/ABSN URINE INCON ASSESS: CPT | Performed by: PHYSICIAN ASSISTANT

## 2020-10-08 PROCEDURE — G8484 FLU IMMUNIZE NO ADMIN: HCPCS | Performed by: PHYSICIAN ASSISTANT

## 2020-10-08 PROCEDURE — 4040F PNEUMOC VAC/ADMIN/RCVD: CPT | Performed by: PHYSICIAN ASSISTANT

## 2020-10-08 PROCEDURE — G8400 PT W/DXA NO RESULTS DOC: HCPCS | Performed by: PHYSICIAN ASSISTANT

## 2020-10-08 PROCEDURE — 1123F ACP DISCUSS/DSCN MKR DOCD: CPT | Performed by: PHYSICIAN ASSISTANT

## 2020-10-08 PROCEDURE — G8417 CALC BMI ABV UP PARAM F/U: HCPCS | Performed by: PHYSICIAN ASSISTANT

## 2020-10-08 PROCEDURE — 99213 OFFICE O/P EST LOW 20 MIN: CPT | Performed by: PHYSICIAN ASSISTANT

## 2020-10-08 RX ORDER — HYDROCODONE BITARTRATE AND ACETAMINOPHEN 5; 325 MG/1; MG/1
1 TABLET ORAL DAILY PRN
Qty: 30 TABLET | Refills: 0 | Status: SHIPPED | OUTPATIENT
Start: 2020-11-07 | End: 2020-12-07

## 2020-10-08 RX ORDER — HYDROCODONE BITARTRATE AND ACETAMINOPHEN 5; 325 MG/1; MG/1
1 TABLET ORAL DAILY PRN
Qty: 30 TABLET | Refills: 0 | Status: SHIPPED | OUTPATIENT
Start: 2020-10-08 | End: 2020-11-07

## 2020-10-08 RX ORDER — HYDROCODONE BITARTRATE AND ACETAMINOPHEN 5; 325 MG/1; MG/1
1 TABLET ORAL DAILY PRN
Qty: 30 TABLET | Refills: 0 | Status: SHIPPED | OUTPATIENT
Start: 2020-12-07 | End: 2021-01-06

## 2020-10-08 NOTE — PROGRESS NOTES
Virtual Visit: PM&R SPINE    CHIEF COMPLAINT:    Chief Complaint   Patient presents with    Back Pain       The patient is being evaluated by a Virtual Visit (video visit) encounter due to the restrictions of the COVID-19 pandemic. A caregiver was present when appropriate. All issues as below were discussed and addressed, but no physical exam was performed unless allowed by visual confirmation. If it was felt that patient should be evaluated in clinic then they were directed there. Due to this being a TeleHealth encounter (During OUWNJ-82 public health emergency), evaluation of the following organ systems was limited to: spine. Pursuant to the emergency declaration under the [facility], [facility] waiver authority and the [employer] Act, this Virtual Visit was conducted with patient's verbal consent, to reduce the risk of exposure to COVID-19 and provide necessary medical care. The patient (and/or legal guardian) has also been advised to contact this office for worsening conditions or problems, and seek emergency medical treatment and/or call 911 if deemed necessary. Services were provided through a video synchronous discussion virtually to substitute for in-person encounter. Individuals present during telemedicine consultation-Jolie Thomas, [facility]    HISTORY OF PRESENT ILLNESS:                The patient is a 68 y.o. female virtual visit for medication refill. She has chronic aching right low back/buttock pain at times extending into the right lateral thigh/calf. Symptoms are increased with prolonged activity bending lifting,  Relief with rest and heat.  Pain is still well managed with Norco 5/325 I po qd PRN without side effects. Medication does allow her to be more functional able to perform ADLs independently, travel, sleep >6hr/night. She recently traveled to [facility].  She denies any progressive numbness tingling or daily as needed (cramping), Disp: 90 capsule, Rfl: 0  Allergies:  Diovan [valsartan]; Bee venom; Formaldehyde; Iodine; Lisinopril; Niaspan [niacin er]; and Amoxicillin  Social History:    reports that she quit smoking about 3 years ago. Her smoking use included cigarettes. She has a 50.00 pack-year smoking history. She has never used smokeless tobacco. She reports that she does not drink alcohol or use drugs. Family History:   Family History   Problem Relation Age of Onset   [facility] Cancer Mother         lung    Cancer Sister         lymphoma    Diabetes Maternal Grandmother     Hemochromatosis Maternal Grandfather        REVIEW OF SYSTEMS:   Patient's review of symptoms was reviewed and is significant for back pain and negative for recent unexplained weight loss, fatigue, current fever/chills, bowel or bladder dysfunction, chest pain, or shortness of breath. All other pertinent ROS were negative. PHYSICAL EXAM--limited to visual exam only  Vitals: not currently breastfeeding. height 5' (1.524 m), weight 173 lb 1 oz (78.5 kg), not currently breastfeeding. GENERAL EXAM:  · General Apparence: Patient is adequately groomed with no evidence of malnutrition. · Orientation: The patient is oriented to time, place and person. · Mood & Affect:The patient's mood and affect are appropriate   LUMBAR/SACRAL EXAMINATION:  · Inspection: Lumbar alignment is normal.  Mild kyphosis. · Range of Motion: 30 degrees of flexion, 10 degrees extension  · Gait & station: Slightly forward flexed unassisted  · Strength: In muscle groups visualized is at least anti gravity. · Sensation: intact to light touch of extremities per patient   · Additional Examinations:   · RIGHT LOWER EXTREMITY: Inspection/examination of the right lower extremity does not show any deformity or injury. Range of motion seen is full. There is no gross instability.    · LEFT LOWER EXTREMITY:  Inspection/examination of the left lower extremity does not show any deformity or injury. Range of motion seen is full. There is no gross instability. Diagnostic Testing:    UDS 3-2-2020 negative for all, taking PRN low dosing      UDS April 2, 2019 +MOP as expected      UDS February 28, 2018 negative for all (last dose 3 days prior)     ORT 5-2018=1     UDS 4-28-17 + for OPI as expected      UDS 4-8-16: Negative for all as expected, last dose was last week (taking PRN)      Lumbar radiographs 3-27-15 2 views AP/LAT: L4-5 spondy. Moderate DDD L4-5 & L5-S1      MRI 11/28/2011:  IMPRESSION-         1. Stable L4 and L5 left pedicle edema of uncertain etiology.         2. L4-L5 facet arthropathy, particularly on the left where there    is a left L4 and L5 nerve root compression.                     Impression:  1) Chronic right mechanical back pain, mild right radiculitis--stable    2) h/o lumbar decompression, Dr. Akiko Mejia   3) H/o multiple interventional procedures, Dr. Juan Carlos Jerome  4) Right hip pain, OA, Dr. Hugh Mathew  5) Opioid maintenance, low risk per ORT =1  6) S/p R TSR, Dr. Kate Pruitt           Plan:    1) Norco 5/325 I po qd PRN #30 x 3 scripts  2) F/u 3mo, sooner if needed     Controlled Substance Monitoring:    Acute and Chronic Pain Monitoring:   RX Monitoring 10/8/2020   Attestation -   Periodic Controlled Substance Monitoring Possible medication side effects, risk of tolerance/dependence & alternative treatments discussed. ;No signs of potential drug abuse or diversion identified. ;Assessed functional status. ;Obtaining appropriate analgesic effect of treatment. Chronic Pain > 50 MEDD Re-evaluated the status of the patient's underlying condition causing pain.;Obtained or confirmed \"Consent for Opioid Use\" on file. Chronic Pain > 80 MEDD -       The risks and use of maintenance opiate medication were reviewed. These include the risk of tolerance, addition, and abuse. Potential side effects were also discussed. Informed verbal consent was obtained.    Medications are to

## 2020-11-09 NOTE — TELEPHONE ENCOUNTER
Phliip Richards [phone] (home)    is requesting refill(s) of medication Metformin to preferred pharmacy [facility] 9/15/ 20(pertaining to medication)   Last refill 7/9/20 (per medication requested)  Next office visit scheduled or attempted Yes  Date 12/15/20  If No, reason

## 2020-12-15 ENCOUNTER — OFFICE VISIT (OUTPATIENT)
Dept: FAMILY MEDICINE CLINIC | Age: 80
End: 2020-12-15
Payer: MEDICARE

## 2020-12-15 VITALS
RESPIRATION RATE: 19 BRPM | DIASTOLIC BLOOD PRESSURE: 72 MMHG | HEART RATE: 80 BPM | TEMPERATURE: 98.2 F | WEIGHT: 173.2 LBS | OXYGEN SATURATION: 95 % | SYSTOLIC BLOOD PRESSURE: 126 MMHG | HEIGHT: 60 IN | BODY MASS INDEX: 34 KG/M2

## 2020-12-15 LAB — HBA1C MFR BLD: 7.3 %

## 2020-12-15 PROCEDURE — 3051F HG A1C>EQUAL 7.0%<8.0%: CPT | Performed by: FAMILY MEDICINE

## 2020-12-15 PROCEDURE — G8484 FLU IMMUNIZE NO ADMIN: HCPCS | Performed by: FAMILY MEDICINE

## 2020-12-15 PROCEDURE — 99214 OFFICE O/P EST MOD 30 MIN: CPT | Performed by: FAMILY MEDICINE

## 2020-12-15 PROCEDURE — 1123F ACP DISCUSS/DSCN MKR DOCD: CPT | Performed by: FAMILY MEDICINE

## 2020-12-15 PROCEDURE — G8400 PT W/DXA NO RESULTS DOC: HCPCS | Performed by: FAMILY MEDICINE

## 2020-12-15 PROCEDURE — 1036F TOBACCO NON-USER: CPT | Performed by: FAMILY MEDICINE

## 2020-12-15 PROCEDURE — 4040F PNEUMOC VAC/ADMIN/RCVD: CPT | Performed by: FAMILY MEDICINE

## 2020-12-15 PROCEDURE — 83036 HEMOGLOBIN GLYCOSYLATED A1C: CPT | Performed by: FAMILY MEDICINE

## 2020-12-15 PROCEDURE — G8417 CALC BMI ABV UP PARAM F/U: HCPCS | Performed by: FAMILY MEDICINE

## 2020-12-15 PROCEDURE — 1090F PRES/ABSN URINE INCON ASSESS: CPT | Performed by: FAMILY MEDICINE

## 2020-12-15 PROCEDURE — G8427 DOCREV CUR MEDS BY ELIG CLIN: HCPCS | Performed by: FAMILY MEDICINE

## 2020-12-15 NOTE — PROGRESS NOTES
SUBJECTIVE:  Radha Quezada is a 68 y.o. female who presents for evaluation of depression, tobacco use, epigastric pain, hypertension, Diabetes Mellitus and hyperlipidemia. She indicates that she is feeling well and denies any symptoms referable to her elevated blood pressure, diabetes or hyperlipidemia. Specifically denies chest pain, , dyspnea, orthopnea, PND,peripheral edema , or neuro sx. No anorexia,  or leg cramps noted. No episodes of hypoglycemia. Current medication regimen is as listed below. She denies any side effects of medication, and has been taking it regularly. Patient's last visit 3 months ago, we started her on Chantix. She says she did not take the medicine but she did stop smoking cold turkey. She also was started on a PPI for this epigastric pain and she was asked to see her GI doctor. She did take the medicine it did help her pain but she has not seen Dr. Tanvir Guthrie. She has not been making any diet changes, she has been checking her blood sugars intermittently but not consistently    Blood Pressure:   Blood pressures are not being checked at home. Ranges have been :     Diabetes: The last A1C: 7.0. Diabetic complications are: none. The Blood sugars range at home have been: . Last Eye Exam : .    The last microalbumin:    Lab Results   Component Value Date    LABMICR YES 11/29/2013         she is not taking aspirin. Social History     Tobacco Use   Smoking Status Former Smoker    Packs/day: 1.00    Years: 50.00    Pack years: 50.00    Types: Cigarettes    Quit date: 5/1/2017    Years since quitting: 3.6   Smokeless Tobacco Never Used   Tobacco Comment    11/8/14- using chantix again as starting to sneak a few cigarettes. quit again with Chantix in March, 2016. smoking again 2/17/17.  \"quit\" 5/1/17, but still occ cigarette       Current Outpatient Medications   Medication Sig Dispense Refill  metFORMIN (GLUCOPHAGE) 500 MG tablet TAKE ONE TABLET BY MOUTH TWICE A DAY WITH MEALS 180 tablet 1    HYDROcodone-acetaminophen (NORCO) 5-325 MG per tablet Take 1 tablet by mouth daily as needed for Pain for up to 30 days. 30 tablet 0    meclizine (ANTIVERT) 25 MG tablet Take 1 tablet by mouth 3 times daily as needed for Dizziness 90 tablet 1    omeprazole (PRILOSEC) 20 MG delayed release capsule Take 1 capsule by mouth daily 30 capsule 3    losartan-hydroCHLOROthiazide (HYZAAR) 100-12.5 MG per tablet TAKE ONE TABLET BY MOUTH DAILY 90 tablet 1    simvastatin (ZOCOR) 40 MG tablet TAKE ONE TABLET BY MOUTH DAILY 90 tablet 1    escitalopram (LEXAPRO) 20 MG tablet TAKE ONE TABLET BY MOUTH DAILY 90 tablet 1    valACYclovir (VALTREX) 1 g tablet TAKE TWO TABLETS BY MOUTH AT THE ONSET OF COLD SORE THEN REPEAT DOSE IN 12 HOURS 20 tablet 1    Collagen Hydrolysate POWD by Does not apply route      Omega-3 Fatty Acids (FISH OIL) 875 MG CAPS Take by mouth      dicyclomine (BENTYL) 10 MG capsule Take 1 capsule by mouth 4 times daily as needed (cramping) 90 capsule 0     No current facility-administered medications for this visit. OBJECTIVE:  /72 (Site: Left Upper Arm, Position: Sitting, Cuff Size: Medium Adult)   Pulse 80   Temp 98.2 °F (36.8 °C) (Temporal)   Resp 19   Ht 5' (1.524 m)   Wt 173 lb 3.2 oz (78.6 kg)   SpO2 95%   Breastfeeding No   BMI 33.83 kg/m²   General: NAD, non-toxic  Neck: no JVD or bruits  S1 and S2 normal, no murmurs, clicks, gallops or rubs. Regular rate and rhythm. Chest is clear; no wheezes or rales. No edema or JVD.     Neuro: alert, no CN or motor deficits,   Psych: normal mood, thought and judgement        ASSESSMENT:   Diabetes Mellitus, today's A1C is 7.3   Diagnosis Orders 1. Type 2 diabetes mellitus without complication, without long-term current use of insulin (HCC)  POCT glycosylated hemoglobin (Hb A1C), A1c up slightly from previous. Gave patient printed low-carb diet information. She was asked to become more engaged in her diabetes including healthier food choices, more movement and weight loss. She also was asked to monitor her blood sugars to give her feedback as far as how her food choices are working   2. Essential hypertension stable continue same medicine    3. Dysthymia, stable continue Lexapro    4. Dyslipidemia with high LDL and low HDL   labs were checked 3 months ago stable continue statin   5. Epigastric pain   continue PPI. Also again encourage patient to schedule with GI. She is worried that there benefited tube down her nose. We discussed that if they do in esophagogastroduodenoscopy they will not put a tube down her nose             Plan:  1)  Medication: continue current medication regimen unchanged  2)  Recheck in 3 months, sooner should new symptoms or problems arise. 3) LLR, reviewed  Jaden Manzanares received counseling on the following healthy behaviors: nutrition and medication adherence    Patient given educational materials on Diabetes and Nutrition    I have instructed Jaden Manzanares to complete a self tracking handout on Blood Sugars  and instructed them to bring it with them to her next appointment. Discussed use, benefit, and side effects of prescribed medications. Barriers to medication compliance addressed. All patient questions answered. Pt voiced understanding.            Amelia Deleon M.D.

## 2021-01-11 LAB — DIABETIC RETINOPATHY: NEGATIVE

## 2021-01-12 ENCOUNTER — TELEPHONE (OUTPATIENT)
Dept: ORTHOPEDIC SURGERY | Age: 81
End: 2021-01-12

## 2021-01-12 NOTE — TELEPHONE ENCOUNTER
General Question     Subject: REQUESTING [facility]  Patient and /or Facility Request:Lilian, [address] Number: [phone]

## 2021-01-19 ENCOUNTER — VIRTUAL VISIT (OUTPATIENT)
Dept: ORTHOPEDIC SURGERY | Age: 81
End: 2021-01-19
Payer: MEDICARE

## 2021-01-19 DIAGNOSIS — M54.16 LUMBAR RADICULITIS: ICD-10-CM

## 2021-01-19 DIAGNOSIS — G89.4 CHRONIC PAIN SYNDROME: Primary | ICD-10-CM

## 2021-01-19 DIAGNOSIS — M51.36 DDD (DEGENERATIVE DISC DISEASE), LUMBAR: ICD-10-CM

## 2021-01-19 PROCEDURE — 1123F ACP DISCUSS/DSCN MKR DOCD: CPT | Performed by: PHYSICIAN ASSISTANT

## 2021-01-19 PROCEDURE — G8400 PT W/DXA NO RESULTS DOC: HCPCS | Performed by: PHYSICIAN ASSISTANT

## 2021-01-19 PROCEDURE — 1036F TOBACCO NON-USER: CPT | Performed by: PHYSICIAN ASSISTANT

## 2021-01-19 PROCEDURE — G8417 CALC BMI ABV UP PARAM F/U: HCPCS | Performed by: PHYSICIAN ASSISTANT

## 2021-01-19 PROCEDURE — 99213 OFFICE O/P EST LOW 20 MIN: CPT | Performed by: PHYSICIAN ASSISTANT

## 2021-01-19 PROCEDURE — 1090F PRES/ABSN URINE INCON ASSESS: CPT | Performed by: PHYSICIAN ASSISTANT

## 2021-01-19 PROCEDURE — G8484 FLU IMMUNIZE NO ADMIN: HCPCS | Performed by: PHYSICIAN ASSISTANT

## 2021-01-19 PROCEDURE — G8427 DOCREV CUR MEDS BY ELIG CLIN: HCPCS | Performed by: PHYSICIAN ASSISTANT

## 2021-01-19 PROCEDURE — 4040F PNEUMOC VAC/ADMIN/RCVD: CPT | Performed by: PHYSICIAN ASSISTANT

## 2021-01-19 RX ORDER — HYDROCODONE BITARTRATE AND ACETAMINOPHEN 5; 325 MG/1; MG/1
1 TABLET ORAL DAILY PRN
Qty: 30 TABLET | Refills: 0 | Status: SHIPPED | OUTPATIENT
Start: 2021-02-18 | End: 2021-06-14 | Stop reason: SDUPTHER

## 2021-01-19 RX ORDER — HYDROCODONE BITARTRATE AND ACETAMINOPHEN 5; 325 MG/1; MG/1
1 TABLET ORAL DAILY PRN
Qty: 30 TABLET | Refills: 0 | Status: SHIPPED | OUTPATIENT
Start: 2021-01-19 | End: 2021-02-18

## 2021-01-19 RX ORDER — HYDROCODONE BITARTRATE AND ACETAMINOPHEN 5; 325 MG/1; MG/1
1 TABLET ORAL DAILY PRN
Qty: 30 TABLET | Refills: 0 | Status: SHIPPED | OUTPATIENT
Start: 2021-03-20 | End: 2021-04-26 | Stop reason: SDUPTHER
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
## 2021-02-16 DIAGNOSIS — I10 ESSENTIAL HYPERTENSION: Chronic | ICD-10-CM

## 2021-02-16 DIAGNOSIS — F32.89 OTHER DEPRESSION: Chronic | ICD-10-CM

## 2021-02-16 RX ORDER — SIMVASTATIN 40 MG
TABLET ORAL
Qty: 90 TABLET | Refills: 0 | Status: SHIPPED | OUTPATIENT
Start: 2021-02-16 | End: 2021-06-30 | Stop reason: SDUPTHER

## 2021-02-16 RX ORDER — LOSARTAN POTASSIUM AND HYDROCHLOROTHIAZIDE 12.5; 1 MG/1; MG/1
TABLET ORAL
Qty: 90 TABLET | Refills: 0 | Status: SHIPPED | OUTPATIENT
Start: 2021-02-16 | End: 2021-06-14

## 2021-02-16 RX ORDER — ESCITALOPRAM OXALATE 20 MG/1
TABLET ORAL
Qty: 90 TABLET | Refills: 0 | Status: SHIPPED | OUTPATIENT
Start: 2021-02-16 | End: 2021-06-30 | Stop reason: SDUPTHER

## 2021-03-08 ENCOUNTER — TELEPHONE (OUTPATIENT)
Dept: ORTHOPEDIC SURGERY | Age: 81
End: 2021-03-08

## 2021-03-17 ENCOUNTER — OFFICE VISIT (OUTPATIENT)
Dept: FAMILY MEDICINE CLINIC | Age: 81
End: 2021-03-17
Payer: MEDICARE

## 2021-03-17 VITALS
OXYGEN SATURATION: 98 % | SYSTOLIC BLOOD PRESSURE: 140 MMHG | HEIGHT: 59 IN | TEMPERATURE: 95.9 F | BODY MASS INDEX: 34.88 KG/M2 | HEART RATE: 74 BPM | DIASTOLIC BLOOD PRESSURE: 78 MMHG | WEIGHT: 173 LBS

## 2021-03-17 DIAGNOSIS — E11.9 TYPE 2 DIABETES MELLITUS WITHOUT COMPLICATION, WITHOUT LONG-TERM CURRENT USE OF INSULIN (HCC): Primary | ICD-10-CM

## 2021-03-17 DIAGNOSIS — I10 ESSENTIAL HYPERTENSION: ICD-10-CM

## 2021-03-17 DIAGNOSIS — F34.1 DYSTHYMIA: ICD-10-CM

## 2021-03-17 DIAGNOSIS — E78.5 DYSLIPIDEMIA WITH HIGH LDL AND LOW HDL: ICD-10-CM

## 2021-03-17 DIAGNOSIS — Z11.59 ENCOUNTER FOR HEPATITIS C SCREENING TEST FOR LOW RISK PATIENT: ICD-10-CM

## 2021-03-17 DIAGNOSIS — R01.1 MURMUR: ICD-10-CM

## 2021-03-17 DIAGNOSIS — I35.0 AORTIC VALVE STENOSIS, ETIOLOGY OF CARDIAC VALVE DISEASE UNSPECIFIED: ICD-10-CM

## 2021-03-17 LAB
A/G RATIO: 1.5 (ref 1.1–2.2)
ALBUMIN SERPL-MCNC: 4.8 G/DL (ref 3.4–5)
ALP BLD-CCNC: 52 U/L (ref 40–129)
ALT SERPL-CCNC: 21 U/L (ref 10–40)
ANION GAP SERPL CALCULATED.3IONS-SCNC: 13 MMOL/L (ref 3–16)
AST SERPL-CCNC: 17 U/L (ref 15–37)
BILIRUB SERPL-MCNC: 0.3 MG/DL (ref 0–1)
BUN BLDV-MCNC: 18 MG/DL (ref 7–20)
CALCIUM SERPL-MCNC: 10.2 MG/DL (ref 8.3–10.6)
CHLORIDE BLD-SCNC: 97 MMOL/L (ref 99–110)
CHOLESTEROL, TOTAL: 163 MG/DL (ref 0–199)
CO2: 28 MMOL/L (ref 21–32)
CREAT SERPL-MCNC: 0.5 MG/DL (ref 0.6–1.2)
GFR AFRICAN AMERICAN: >60
GFR NON-AFRICAN AMERICAN: >60
GLOBULIN: 3.3 G/DL
GLUCOSE BLD-MCNC: 149 MG/DL (ref 70–99)
HBA1C MFR BLD: 7.6 %
HDLC SERPL-MCNC: 31 MG/DL (ref 40–60)
HEPATITIS C ANTIBODY INTERPRETATION: NORMAL
LDL CHOLESTEROL CALCULATED: 77 MG/DL
POTASSIUM SERPL-SCNC: 5 MMOL/L (ref 3.5–5.1)
SODIUM BLD-SCNC: 138 MMOL/L (ref 136–145)
TOTAL PROTEIN: 8.1 G/DL (ref 6.4–8.2)
TRIGL SERPL-MCNC: 275 MG/DL (ref 0–150)
VLDLC SERPL CALC-MCNC: 55 MG/DL

## 2021-03-17 PROCEDURE — G8400 PT W/DXA NO RESULTS DOC: HCPCS | Performed by: FAMILY MEDICINE

## 2021-03-17 PROCEDURE — 1123F ACP DISCUSS/DSCN MKR DOCD: CPT | Performed by: FAMILY MEDICINE

## 2021-03-17 PROCEDURE — G8417 CALC BMI ABV UP PARAM F/U: HCPCS | Performed by: FAMILY MEDICINE

## 2021-03-17 PROCEDURE — 1036F TOBACCO NON-USER: CPT | Performed by: FAMILY MEDICINE

## 2021-03-17 PROCEDURE — 3051F HG A1C>EQUAL 7.0%<8.0%: CPT | Performed by: FAMILY MEDICINE

## 2021-03-17 PROCEDURE — 4040F PNEUMOC VAC/ADMIN/RCVD: CPT | Performed by: FAMILY MEDICINE

## 2021-03-17 PROCEDURE — G8484 FLU IMMUNIZE NO ADMIN: HCPCS | Performed by: FAMILY MEDICINE

## 2021-03-17 PROCEDURE — 83036 HEMOGLOBIN GLYCOSYLATED A1C: CPT | Performed by: FAMILY MEDICINE

## 2021-03-17 PROCEDURE — 36415 COLL VENOUS BLD VENIPUNCTURE: CPT | Performed by: FAMILY MEDICINE

## 2021-03-17 PROCEDURE — 99215 OFFICE O/P EST HI 40 MIN: CPT | Performed by: FAMILY MEDICINE

## 2021-03-17 PROCEDURE — G8427 DOCREV CUR MEDS BY ELIG CLIN: HCPCS | Performed by: FAMILY MEDICINE

## 2021-03-17 PROCEDURE — 1090F PRES/ABSN URINE INCON ASSESS: CPT | Performed by: FAMILY MEDICINE

## 2021-03-17 NOTE — PROGRESS NOTES
SUBJECTIVE:  Karen Ha is a 68 y.o. female who presents for evaluation of depression, aortic stenosis murmur, hypertension, Diabetes Mellitus and hyperlipidemia. She indicates that she is feeling well and denies any symptoms referable to her elevated blood pressure, diabetes or hyperlipidemia. Specifically denies chest pain, , dyspnea, orthopnea, PND,peripheral edema , or neuro sx. No anorexia,  or leg cramps noted. No episodes of hypoglycemia. Current medication regimen is as listed below. She denies any side effects of medication, and has been taking it regularly. Patient is on Lexapro 20 mg for depression. She continues to have issues with depression. She declines working with our behavioral health counselor. She says she just needs to get over it. There are days where she sleeps for 20 hours. She has no activities outside of her home. Her sister and relatives moved to [place]. She has no desire to move to [place]. She says she has 9 cats and that is the reason that she gets up in the morning. She denies suicidal thoughts or ideation. Her last echocardiogram was in 2018. She had mild aortic stenosis. We need to follow-up on that. Patient denies shortness of breath or edema. Patient states she has been taking her diabetes medicine. She will check blood sugars but generally fasting she is not checking 2 hours postprandial.  Blood Pressure:   Blood pressures are not being checked at home. Ranges have been :     Diabetes: The last A1C: 7.3. Diabetic complications are: none. The Blood sugars range at home have been: 130. Last Eye Exam : .    The last microalbumin:    Lab Results   Component Value Date    LABMICR YES 11/29/2013         she is not taking aspirin.       Social History     Tobacco Use   Smoking Status Former Smoker    Packs/day: 1.00    Years: 50.00    Pack years: 50.00    Types: Cigarettes    Quit date: 5/1/2017    Years since quitting: 3.8   Smokeless Tobacco Never Used   Tobacco Comment    11/8/14- using chantix again as starting to sneak a few cigarettes. quit again with Chantix in March, 2016. smoking again 2/17/17. \"quit\" 5/1/17, but still occ cigarette       Current Outpatient Medications   Medication Sig Dispense Refill    simvastatin (ZOCOR) 40 MG tablet TAKE ONE TABLET BY MOUTH DAILY 90 tablet 0    escitalopram (LEXAPRO) 20 MG tablet TAKE ONE TABLET BY MOUTH DAILY 90 tablet 0    losartan-hydroCHLOROthiazide (HYZAAR) 100-12.5 MG per tablet TAKE ONE TABLET BY MOUTH DAILY 90 tablet 0    HYDROcodone-acetaminophen (NORCO) 5-325 MG per tablet Take 1 tablet by mouth daily as needed for Pain for up to 30 days. 30 tablet 0    [START ON 3/20/2021] HYDROcodone-acetaminophen (NORCO) 5-325 MG per tablet Take 1 tablet by mouth daily as needed for Pain for up to 30 days. 30 tablet 0    metFORMIN (GLUCOPHAGE) 500 MG tablet TAKE ONE TABLET BY MOUTH TWICE A DAY WITH MEALS 180 tablet 1    omeprazole (PRILOSEC) 20 MG delayed release capsule Take 1 capsule by mouth daily 30 capsule 3    valACYclovir (VALTREX) 1 g tablet TAKE TWO TABLETS BY MOUTH AT THE ONSET OF COLD SORE THEN REPEAT DOSE IN 12 HOURS 20 tablet 1    Collagen Hydrolysate POWD by Does not apply route      Omega-3 Fatty Acids (FISH OIL) 875 MG CAPS Take by mouth      dicyclomine (BENTYL) 10 MG capsule Take 1 capsule by mouth 4 times daily as needed (cramping) 90 capsule 0     No current facility-administered medications for this visit. OBJECTIVE:  BP (!) 140/78   Pulse 74   Temp 95.9 °F (35.5 °C) (Temporal)   Ht 4' 11.3\" (1.506 m)   Wt 173 lb (78.5 kg)   SpO2 98%   BMI 34.59 kg/m²   General: NAD, non-toxic  Neck: no JVD or bruits  S1 and S2 normal, 2/6 murmurs, no clicks, gallops or rubs. Regular rate and rhythm. Chest is clear; no wheezes or rales. No edema or JVD.     Neuro: alert, no CN or motor deficits,   Psych: Depressed mood, normal thought and judgement, flat affect        ASSESSMENT: Diabetes Mellitus, today's A1C is 7.6   Diagnosis Orders   1. Type 2 diabetes mellitus without complication, without long-term current use of insulin (formerly [facility])  POCT glycosylated hemoglobin (Hb A1C), now 7.6. Patient says that she has a sweet tooth and is not making diabetic food choices. Essentially, she is depressed and therefore not paying attention to her other chronic illnesses. We will increase her Metformin to a dose of 1000 mg twice a day. Patient says she has an over supply of Metformin. She will take 2 of her 500 mg tablets twice a day. She was given written instructions regarding this change   2. Aortic valve stenosis, etiology of cardiac valve disease unspecified  Echocardiogram complete, no symptoms but need to follow-up since it has been 3 years   3. Murmur  Echocardiogram complete   4. Essential hypertension, fair control continue losartan HCTZ Comprehensive Metabolic Panel   5. Dysthymia, very long discussion with patient regarding this issue. Again offered her help and discussed with her in understanding that if she is not feeling well emotionally that her other issues will not improve until she feels better. She agrees, appreciates offers of help but says that she will get over this on her own    6. Dyslipidemia with high LDL and low HDL, labs are pending Lipid Panel   7. Encounter for hepatitis C screening test for low risk patient  HEPATITIS C ANTIBODY             Plan:  1)  Medication: continue current medication regimen unchanged  2)  Recheck in 3 months, sooner should new symptoms or problems arise. 3) LLR  Marianela received counseling on the following healthy behaviors: nutrition and medication adherence        Discussed use, benefit, and side effects of prescribed medications. Barriers to medication compliance addressed. All patient questions answered. Pt voiced understanding.       I spent a total of 40* minutes face to face with this patient, over 50% of that time was spent on counseling and care coordination. Please see assessment and plan for counseling and care coordination details. Merline Orantes M.D.

## 2021-04-02 ENCOUNTER — HOSPITAL ENCOUNTER (OUTPATIENT)
Dept: CARDIOLOGY | Age: 81
Discharge: HOME | End: 2021-04-02
Payer: MEDICARE

## 2021-04-02 DIAGNOSIS — R01.1 MURMUR: ICD-10-CM

## 2021-04-02 DIAGNOSIS — I35.0 AORTIC VALVE STENOSIS, ETIOLOGY OF CARDIAC VALVE DISEASE UNSPECIFIED: ICD-10-CM

## 2021-04-02 LAB
LV EF: 65 %
LVEF MODALITY: NORMAL

## 2021-04-02 PROCEDURE — 93306 TTE W/DOPPLER COMPLETE: CPT

## 2021-04-16 ENCOUNTER — TELEPHONE (OUTPATIENT)
Dept: ORTHOPEDIC SURGERY | Age: 81
End: 2021-04-16

## 2021-04-23 NOTE — TELEPHONE ENCOUNTER
Farzana Kendell [phone] (home)    is requesting refill(s) of medication Metformin 1,000 mg  to [facility] [phone] (pertaining to medication)   Last refill 11-09-20 (per medication requested)  Next office visit scheduled or attempted Yes  Date 06-30-21  If No, reason made

## 2021-04-26 ENCOUNTER — OFFICE VISIT (OUTPATIENT)
Dept: ORTHOPEDIC SURGERY | Age: 81
End: 2021-04-26
Payer: MEDICARE

## 2021-04-26 VITALS — BODY MASS INDEX: 34.88 KG/M2 | HEIGHT: 59 IN | WEIGHT: 173 LBS

## 2021-04-26 DIAGNOSIS — G89.4 CHRONIC PAIN SYNDROME: Primary | ICD-10-CM

## 2021-04-26 DIAGNOSIS — M54.16 LUMBAR RADICULITIS: ICD-10-CM

## 2021-04-26 DIAGNOSIS — M51.36 DDD (DEGENERATIVE DISC DISEASE), LUMBAR: ICD-10-CM

## 2021-04-26 PROBLEM — M19.012 ARTHRITIS OF LEFT SHOULDER REGION: Status: ACTIVE | Noted: 2017-07-26

## 2021-04-26 PROBLEM — M19.011 ARTHRITIS OF RIGHT SHOULDER REGION: Status: ACTIVE | Noted: 2017-12-11

## 2021-04-26 PROBLEM — M75.120 COMPLETE TEAR OF ROTATOR CUFF: Status: ACTIVE | Noted: 2017-07-26

## 2021-04-26 PROCEDURE — G8427 DOCREV CUR MEDS BY ELIG CLIN: HCPCS | Performed by: PHYSICIAN ASSISTANT

## 2021-04-26 PROCEDURE — 99214 OFFICE O/P EST MOD 30 MIN: CPT | Performed by: PHYSICIAN ASSISTANT

## 2021-04-26 PROCEDURE — G8417 CALC BMI ABV UP PARAM F/U: HCPCS | Performed by: PHYSICIAN ASSISTANT

## 2021-04-26 PROCEDURE — 1036F TOBACCO NON-USER: CPT | Performed by: PHYSICIAN ASSISTANT

## 2021-04-26 PROCEDURE — 1090F PRES/ABSN URINE INCON ASSESS: CPT | Performed by: PHYSICIAN ASSISTANT

## 2021-04-26 PROCEDURE — 4040F PNEUMOC VAC/ADMIN/RCVD: CPT | Performed by: PHYSICIAN ASSISTANT

## 2021-04-26 PROCEDURE — 1123F ACP DISCUSS/DSCN MKR DOCD: CPT | Performed by: PHYSICIAN ASSISTANT

## 2021-04-26 PROCEDURE — G8400 PT W/DXA NO RESULTS DOC: HCPCS | Performed by: PHYSICIAN ASSISTANT

## 2021-04-26 RX ORDER — HYDROCODONE BITARTRATE AND ACETAMINOPHEN 5; 325 MG/1; MG/1
1 TABLET ORAL DAILY PRN
Qty: 30 TABLET | Refills: 0 | Status: CANCELLED | OUTPATIENT
Start: 2021-04-26 | End: 2021-05-26

## 2021-04-26 RX ORDER — HYDROCODONE BITARTRATE AND ACETAMINOPHEN 5; 325 MG/1; MG/1
1 TABLET ORAL DAILY PRN
Qty: 30 TABLET | Refills: 0 | Status: SHIPPED | OUTPATIENT
Start: 2021-04-26 | End: 2021-05-26
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
## 2021-04-28 DIAGNOSIS — E11.9 TYPE 2 DIABETES MELLITUS WITHOUT COMPLICATION, WITHOUT LONG-TERM CURRENT USE OF INSULIN (HCC): ICD-10-CM

## 2021-04-28 RX ORDER — METFORMIN HYDROCHLORIDE 500 MG/1
500 TABLET, EXTENDED RELEASE ORAL
Qty: 30 TABLET | Refills: 0 | OUTPATIENT
Start: 2021-04-28

## 2021-04-28 NOTE — TELEPHONE ENCOUNTER
LM for the pt to contact the office. This medication was changed to 1000 mg bid on 4/23/21. This was sent to the pharmacy on 4/23/21 with 2 refills. This refill is not needed.

## 2021-04-28 NOTE — TELEPHONE ENCOUNTER
Patient returned call. I read her the nurse note. She will call [facility] as they have not called her regarding the refill on 4-23-21.

## 2021-05-18 ENCOUNTER — TELEPHONE (OUTPATIENT)
Dept: ORTHOPEDIC SURGERY | Age: 81
End: 2021-05-18

## 2021-06-14 ENCOUNTER — OFFICE VISIT (OUTPATIENT)
Dept: ORTHOPEDIC SURGERY | Age: 81
End: 2021-06-14
Payer: MEDICARE

## 2021-06-14 VITALS — HEIGHT: 60 IN | WEIGHT: 173 LBS | BODY MASS INDEX: 33.96 KG/M2

## 2021-06-14 DIAGNOSIS — M51.36 DDD (DEGENERATIVE DISC DISEASE), LUMBAR: ICD-10-CM

## 2021-06-14 DIAGNOSIS — M54.16 LUMBAR RADICULITIS: ICD-10-CM

## 2021-06-14 DIAGNOSIS — G89.4 CHRONIC PAIN SYNDROME: Primary | ICD-10-CM

## 2021-06-14 DIAGNOSIS — I10 ESSENTIAL HYPERTENSION: Chronic | ICD-10-CM

## 2021-06-14 PROCEDURE — 1123F ACP DISCUSS/DSCN MKR DOCD: CPT | Performed by: PHYSICIAN ASSISTANT

## 2021-06-14 PROCEDURE — G8427 DOCREV CUR MEDS BY ELIG CLIN: HCPCS | Performed by: PHYSICIAN ASSISTANT

## 2021-06-14 PROCEDURE — G8400 PT W/DXA NO RESULTS DOC: HCPCS | Performed by: PHYSICIAN ASSISTANT

## 2021-06-14 PROCEDURE — 4040F PNEUMOC VAC/ADMIN/RCVD: CPT | Performed by: PHYSICIAN ASSISTANT

## 2021-06-14 PROCEDURE — 1090F PRES/ABSN URINE INCON ASSESS: CPT | Performed by: PHYSICIAN ASSISTANT

## 2021-06-14 PROCEDURE — 99213 OFFICE O/P EST LOW 20 MIN: CPT | Performed by: PHYSICIAN ASSISTANT

## 2021-06-14 PROCEDURE — G8417 CALC BMI ABV UP PARAM F/U: HCPCS | Performed by: PHYSICIAN ASSISTANT

## 2021-06-14 PROCEDURE — 1036F TOBACCO NON-USER: CPT | Performed by: PHYSICIAN ASSISTANT

## 2021-06-14 RX ORDER — HYDROCODONE BITARTRATE AND ACETAMINOPHEN 5; 325 MG/1; MG/1
1 TABLET ORAL DAILY PRN
Qty: 30 TABLET | Refills: 0 | Status: SHIPPED | OUTPATIENT
Start: 2021-07-14 | End: 2021-09-13 | Stop reason: SDUPTHER

## 2021-06-14 RX ORDER — LOSARTAN POTASSIUM AND HYDROCHLOROTHIAZIDE 12.5; 1 MG/1; MG/1
TABLET ORAL
Qty: 90 TABLET | Refills: 0 | Status: SHIPPED | OUTPATIENT
Start: 2021-06-14 | End: 2021-09-24

## 2021-06-14 RX ORDER — HYDROCODONE BITARTRATE AND ACETAMINOPHEN 5; 325 MG/1; MG/1
1 TABLET ORAL DAILY PRN
Qty: 30 TABLET | Refills: 0 | Status: SHIPPED
Start: 2021-06-14 | End: 2021-06-30 | Stop reason: SDUPTHER

## 2021-06-14 RX ORDER — HYDROCODONE BITARTRATE AND ACETAMINOPHEN 5; 325 MG/1; MG/1
1 TABLET ORAL DAILY PRN
Qty: 30 TABLET | Refills: 0 | Status: SHIPPED
Start: 2021-08-14 | End: 2021-06-30 | Stop reason: SDUPTHER

## 2021-06-14 NOTE — TELEPHONE ENCOUNTER
Michele Guillermo [phone] (home)    is requesting refill(s) of medication Losartan-Hydrochlorothiazide to preferred pharmacy [facility] [id number] 3/17/21 (pertaining to medication)   Last refill 2/16/21 (per medication requested)  Next office visit scheduled or attempted Yes  Date 6/30/21  If No, reason
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
## 2021-06-28 ENCOUNTER — TELEPHONE (OUTPATIENT)
Dept: ORTHOPEDIC SURGERY | Age: 81
End: 2021-06-28

## 2021-06-29 ENCOUNTER — TELEPHONE (OUTPATIENT)
Dept: ORTHOPEDIC SURGERY | Age: 81
End: 2021-06-29

## 2021-06-29 DIAGNOSIS — M54.16 LUMBAR RADICULITIS: ICD-10-CM

## 2021-06-29 DIAGNOSIS — M51.36 DDD (DEGENERATIVE DISC DISEASE), LUMBAR: Primary | ICD-10-CM

## 2021-06-29 DIAGNOSIS — G89.4 CHRONIC PAIN SYNDROME: ICD-10-CM

## 2021-06-29 PROBLEM — M51.369 DDD (DEGENERATIVE DISC DISEASE), LUMBAR: Status: ACTIVE | Noted: 2021-06-29

## 2021-06-29 NOTE — TELEPHONE ENCOUNTER
Called & spoke with the patient, patient has noticed back pain getting worse, she is requesting an MRI. MRI ordered put in and faxed to [id number] in [facility].

## 2021-06-30 ENCOUNTER — OFFICE VISIT (OUTPATIENT)
Dept: FAMILY MEDICINE CLINIC | Age: 81
End: 2021-06-30
Payer: MEDICARE

## 2021-06-30 ENCOUNTER — HOSPITAL ENCOUNTER (OUTPATIENT)
Dept: GENERAL RADIOLOGY | Age: 81
Discharge: HOME | End: 2021-06-30
Payer: MEDICARE

## 2021-06-30 ENCOUNTER — HOSPITAL ENCOUNTER (OUTPATIENT)
Dept: MRI IMAGING | Age: 81
Discharge: HOME | End: 2021-06-30
Payer: MEDICARE

## 2021-06-30 VITALS
HEIGHT: 60 IN | SYSTOLIC BLOOD PRESSURE: 154 MMHG | WEIGHT: 171.8 LBS | HEART RATE: 72 BPM | BODY MASS INDEX: 33.73 KG/M2 | OXYGEN SATURATION: 95 % | DIASTOLIC BLOOD PRESSURE: 70 MMHG

## 2021-06-30 DIAGNOSIS — R06.02 SHORTNESS OF BREATH: ICD-10-CM

## 2021-06-30 DIAGNOSIS — E11.9 TYPE 2 DIABETES MELLITUS WITHOUT COMPLICATION, WITHOUT LONG-TERM CURRENT USE OF INSULIN (HCC): Primary | ICD-10-CM

## 2021-06-30 DIAGNOSIS — G89.4 CHRONIC PAIN SYNDROME: ICD-10-CM

## 2021-06-30 DIAGNOSIS — M54.16 LUMBAR RADICULITIS: ICD-10-CM

## 2021-06-30 DIAGNOSIS — I35.0 AORTIC VALVE STENOSIS, ETIOLOGY OF CARDIAC VALVE DISEASE UNSPECIFIED: ICD-10-CM

## 2021-06-30 DIAGNOSIS — I10 ESSENTIAL HYPERTENSION: ICD-10-CM

## 2021-06-30 DIAGNOSIS — E78.5 DYSLIPIDEMIA WITH HIGH LDL AND LOW HDL: ICD-10-CM

## 2021-06-30 DIAGNOSIS — F32.89 OTHER DEPRESSION: Chronic | ICD-10-CM

## 2021-06-30 DIAGNOSIS — M51.36 DDD (DEGENERATIVE DISC DISEASE), LUMBAR: ICD-10-CM

## 2021-06-30 PROBLEM — F17.200 SMOKER: Chronic | Status: RESOLVED | Noted: 2017-02-22 | Resolved: 2021-06-30

## 2021-06-30 LAB
BASOPHILS ABSOLUTE: 0 K/UL (ref 0–0.2)
BASOPHILS RELATIVE PERCENT: 0.4 %
EOSINOPHILS ABSOLUTE: 0.1 K/UL (ref 0–0.6)
EOSINOPHILS RELATIVE PERCENT: 1.3 %
HBA1C MFR BLD: 7.2 %
HCT VFR BLD CALC: 37.7 % (ref 36–48)
HEMOGLOBIN: 12.9 G/DL (ref 12–16)
LYMPHOCYTES ABSOLUTE: 2.9 K/UL (ref 1–5.1)
LYMPHOCYTES RELATIVE PERCENT: 31.5 %
MCH RBC QN AUTO: 29.6 PG (ref 26–34)
MCHC RBC AUTO-ENTMCNC: 34.3 G/DL (ref 31–36)
MCV RBC AUTO: 86.3 FL (ref 80–100)
MONOCYTES ABSOLUTE: 0.7 K/UL (ref 0–1.3)
MONOCYTES RELATIVE PERCENT: 7.7 %
NEUTROPHILS ABSOLUTE: 5.4 K/UL (ref 1.7–7.7)
NEUTROPHILS RELATIVE PERCENT: 59.1 %
PDW BLD-RTO: 13.8 % (ref 12.4–15.4)
PLATELET # BLD: 290 K/UL (ref 135–450)
PMV BLD AUTO: 8.3 FL (ref 5–10.5)
RBC # BLD: 4.37 M/UL (ref 4–5.2)
WBC # BLD: 9.1 K/UL (ref 4–11)

## 2021-06-30 PROCEDURE — 1090F PRES/ABSN URINE INCON ASSESS: CPT | Performed by: FAMILY MEDICINE

## 2021-06-30 PROCEDURE — G8427 DOCREV CUR MEDS BY ELIG CLIN: HCPCS | Performed by: FAMILY MEDICINE

## 2021-06-30 PROCEDURE — G8400 PT W/DXA NO RESULTS DOC: HCPCS | Performed by: FAMILY MEDICINE

## 2021-06-30 PROCEDURE — G8417 CALC BMI ABV UP PARAM F/U: HCPCS | Performed by: FAMILY MEDICINE

## 2021-06-30 PROCEDURE — 1123F ACP DISCUSS/DSCN MKR DOCD: CPT | Performed by: FAMILY MEDICINE

## 2021-06-30 PROCEDURE — 3051F HG A1C>EQUAL 7.0%<8.0%: CPT | Performed by: FAMILY MEDICINE

## 2021-06-30 PROCEDURE — 93000 ELECTROCARDIOGRAM COMPLETE: CPT | Performed by: FAMILY MEDICINE

## 2021-06-30 PROCEDURE — 36415 COLL VENOUS BLD VENIPUNCTURE: CPT | Performed by: FAMILY MEDICINE

## 2021-06-30 PROCEDURE — 83036 HEMOGLOBIN GLYCOSYLATED A1C: CPT | Performed by: FAMILY MEDICINE

## 2021-06-30 PROCEDURE — 4040F PNEUMOC VAC/ADMIN/RCVD: CPT | Performed by: FAMILY MEDICINE

## 2021-06-30 PROCEDURE — G1010 CDSM STANSON: HCPCS

## 2021-06-30 PROCEDURE — 71046 X-RAY EXAM CHEST 2 VIEWS: CPT

## 2021-06-30 PROCEDURE — 1036F TOBACCO NON-USER: CPT | Performed by: FAMILY MEDICINE

## 2021-06-30 PROCEDURE — 99215 OFFICE O/P EST HI 40 MIN: CPT | Performed by: FAMILY MEDICINE

## 2021-06-30 RX ORDER — VALACYCLOVIR HYDROCHLORIDE 1 G/1
1000 TABLET, FILM COATED ORAL EVERY 12 HOURS
Qty: 20 TABLET | Refills: 1 | Status: SHIPPED | OUTPATIENT
Start: 2021-06-30 | End: 2022-09-19

## 2021-06-30 RX ORDER — BUDESONIDE, GLYCOPYRROLATE, AND FORMOTEROL FUMARATE 160; 9; 4.8 UG/1; UG/1; UG/1
2 AEROSOL, METERED RESPIRATORY (INHALATION) 2 TIMES DAILY
Qty: 1 INHALER | Refills: 0 | COMMUNITY
Start: 2021-06-30

## 2021-06-30 RX ORDER — SIMVASTATIN 40 MG
40 TABLET ORAL NIGHTLY
Qty: 90 TABLET | Refills: 1 | Status: SHIPPED | OUTPATIENT
Start: 2021-06-30 | End: 2021-10-18 | Stop reason: SDUPTHER

## 2021-06-30 RX ORDER — ESCITALOPRAM OXALATE 20 MG/1
20 TABLET ORAL DAILY
Qty: 90 TABLET | Refills: 1 | Status: SHIPPED | OUTPATIENT
Start: 2021-06-30 | End: 2021-10-18 | Stop reason: SDUPTHER

## 2021-06-30 NOTE — PROGRESS NOTES
Laura Hanson presents for   Chief Complaint   Patient presents with    Diabetes     pt states that she is here for a 3 motnh f/u, is fasting for bw     Hypertension    Hyperlipidemia    Depression        ASSESSMENT:   Diagnosis Orders   1. Type 2 diabetes mellitus without complication, without long-term current use of insulin ([address] improved A1c now 7.2 POCT glycosylated hemoglobin (Hb A1C)   2. Other depression, stable but persistent continue same medicine escitalopram (LEXAPRO) 20 MG tablet   3. Essential hypertension, continue losartan HCTZ EKG 12 Lead   4. Aortic valve stenosis, etiology of cardiac valve disease unspecified, stable evaluated by echo 2 months ago EKG 12 Lead   5. Dyslipidemia with high LDL and low HDL, stable continue statin    6. Shortness of breath, new issue EKG showed sinus rhythm, she had an echocardiogram done just 2 months ago. I believe her shortness of breath is more COPD related. We gave her a sample of Breztri, patient was instructed on proper use. We will send for chest x-ray and check a CBC as well. We need to continue to evaluate and work until this improves, short-term follow-up is important EKG 12 Lead    Budeson-Glycopyrrol-Formoterol (BREZTRI AEROSPHERE) 160-9-4.8 MCG/ACT AERO    XR CHEST STANDARD (2 VW)    CBC Auto Differential        Plan:  1)  Medication: continue current medication regimen , new inhaler as above  2)  Recheck in 3 months, sooner should new symptoms or problems arise. 3) LLR reviewed, CBC ordered      I spent a total of 40* minutes face to face with this patient, reviewing labs reviewing echo reviewing EKG      SUBJECTIVE:  Laura Hanson is a 80 y.o. female who presents for evaluation of diabetes, depression, aortic stenosis, hypertension and hyperlipidemia. Specifically denies chest pain, palpitations,  orthopnea, PND or peripheral edema or neuro sx. No anorexia,  or leg cramps noted. Current medication regimen is as listed below.  She denies any side effects of medication, and has been taking it regularly. Lab Results   Component Value Date    LDLCALC 77 03/17/2021    LDLDIRECT 123 (H) 01/03/2018       This appointment was scheduled 3 months ago for follow-up of her diabetes. Patient's last A1c was 7.6. She has been working harder on better food choices, her Metformin was increased to 1000 mg twice a day. Patient had fasting blood work done at the last appointment we reviewed these numbers. Her blood pressure is up today. Today she complains of a new symptom of shortness of breath. She says she just returned home from [place] while she was in the airport she was feeling very short of breath with exertion. She denies chest pain, leg edema. Again, she just had an echocardiogram 2 months ago to evaluate her aortic stenosis. Ejection fraction was not reduced at that time. Patient has a long standing smoking history. She recently stopped smoking. Patient denies cough or chest congestion or wheezing. Current Outpatient Medications   Medication Sig Dispense Refill    escitalopram (LEXAPRO) 20 MG tablet Take 1 tablet by mouth daily 90 tablet 1    simvastatin (ZOCOR) 40 MG tablet Take 1 tablet by mouth nightly 90 tablet 1    valACYclovir (VALTREX) 1 g tablet Take 1 tablet by mouth every 12 hours 20 tablet 1    Budeson-Glycopyrrol-Formoterol (BREZTRI AEROSPHERE) 160-9-4.8 MCG/ACT AERO Inhale 2 puffs into the lungs 2 times daily 1 Inhaler 0    losartan-hydroCHLOROthiazide (HYZAAR) 100-12.5 MG per tablet TAKE ONE TABLET BY MOUTH DAILY 90 tablet 0    [START ON 7/14/2021] HYDROcodone-acetaminophen (NORCO) 5-325 MG per tablet Take 1 tablet by mouth daily as needed for Pain for up to 30 days.  30 tablet 0    metFORMIN (GLUCOPHAGE) 1000 MG tablet Take 1 tablet by mouth 2 times daily (with meals) 60 tablet 2    omeprazole (PRILOSEC) 20 MG delayed release capsule Take 1 capsule by mouth daily 30 capsule 3    Collagen Hydrolysate POWD by Does not apply route      Omega-3 Fatty Acids (FISH OIL) 875 MG CAPS Take by mouth      dicyclomine (BENTYL) 10 MG capsule Take 1 capsule by mouth 4 times daily as needed (cramping) 90 capsule 0     No current facility-administered medications for this visit. Allergies   Allergen Reactions    Diovan [Valsartan] Anaphylaxis     Throat swelling      Bee Venom     Formaldehyde Itching    Iodine Hives     IVP dye    Lisinopril      Throat swelling  Angioedema      Niaspan [Niacin Er]      facial rash and itching    Amoxicillin Rash, Hives and Itching       Social History     Tobacco Use    Smoking status: Former Smoker     Packs/day: 1.00     Years: 50.00     Pack years: 50.00     Types: Cigarettes     Quit date: 2017     Years since quittin.1    Smokeless tobacco: Never Used    Tobacco comment: 16- using chantix again as starting to sneak a few cigarettes. quit again with Chantix in . smoking again 19. \"quit\" 19, but still occ cigarette   Substance Use Topics    Alcohol use: No     Alcohol/week: 0.0 standard drinks       OBJECTIVE:   BP (!) 154/70   Pulse 72   Ht 4' 11.5\" (1.511 m)   Wt 171 lb 12.8 oz (77.9 kg)   SpO2 95%   BMI 34.12 kg/m²   NAD  Neck no bruit or JVD  S1 and S2 normal, 2/6 murmurs, no clicks, gallops or rubs. Regular rate and rhythm. Chest is clear; no wheezes or rales. No edema or JVD.   Neuro alert, no CN or motor deficits  Psych nl mood, thought and judgement

## 2021-07-02 ENCOUNTER — TELEPHONE (OUTPATIENT)
Dept: FAMILY MEDICINE CLINIC | Age: 81
End: 2021-07-02

## 2021-07-02 NOTE — TELEPHONE ENCOUNTER
Patient was asked to report how she was feeling after starting the inhaler. She is feeling fine, but she still has the SOB. It has helped a little bit, but not whole lot. She does not know if it is a complication with her esophagus. An endoscopy is scheduled for 7-26-21. She will have Dr. De Briceño send you the results.

## 2021-07-06 ENCOUNTER — TELEPHONE (OUTPATIENT)
Dept: ORTHOPEDIC SURGERY | Age: 81
End: 2021-07-06

## 2021-07-06 ENCOUNTER — VIRTUAL VISIT (OUTPATIENT)
Dept: ORTHOPEDIC SURGERY | Age: 81
End: 2021-07-06
Payer: MEDICARE

## 2021-07-06 DIAGNOSIS — M51.36 DDD (DEGENERATIVE DISC DISEASE), LUMBAR: ICD-10-CM

## 2021-07-06 DIAGNOSIS — M54.16 LUMBAR RADICULITIS: Primary | ICD-10-CM

## 2021-07-06 DIAGNOSIS — G89.4 CHRONIC PAIN SYNDROME: ICD-10-CM

## 2021-07-06 PROCEDURE — 4040F PNEUMOC VAC/ADMIN/RCVD: CPT | Performed by: PHYSICIAN ASSISTANT

## 2021-07-06 PROCEDURE — 99213 OFFICE O/P EST LOW 20 MIN: CPT | Performed by: PHYSICIAN ASSISTANT

## 2021-07-06 PROCEDURE — G8428 CUR MEDS NOT DOCUMENT: HCPCS | Performed by: PHYSICIAN ASSISTANT

## 2021-07-06 PROCEDURE — 1036F TOBACCO NON-USER: CPT | Performed by: PHYSICIAN ASSISTANT

## 2021-07-06 PROCEDURE — G8400 PT W/DXA NO RESULTS DOC: HCPCS | Performed by: PHYSICIAN ASSISTANT

## 2021-07-06 PROCEDURE — 1090F PRES/ABSN URINE INCON ASSESS: CPT | Performed by: PHYSICIAN ASSISTANT

## 2021-07-06 PROCEDURE — 1123F ACP DISCUSS/DSCN MKR DOCD: CPT | Performed by: PHYSICIAN ASSISTANT

## 2021-07-06 PROCEDURE — G8417 CALC BMI ABV UP PARAM F/U: HCPCS | Performed by: PHYSICIAN ASSISTANT

## 2021-07-06 NOTE — TELEPHONE ENCOUNTER
Called & attempted to get ahold of the patient with no luck. Patient needs to be seen in person or virtually with Kb Glasgow PA-C in order to go over MRI results.
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
## 2021-07-28 NOTE — TELEPHONE ENCOUNTER
Juliana Rees [phone] (home)    is requesting refill(s) of medication Metformin to preferred pharmacy [facility] 6/30/21 (pertaining to medication)   Last refill 4/23/21 (per medication requested)  Next office visit scheduled or attempted No  Date   If No, reason

## 2021-09-01 ENCOUNTER — TELEPHONE (OUTPATIENT)
Dept: FAMILY MEDICINE CLINIC | Age: 81
End: 2021-09-01

## 2021-09-01 NOTE — TELEPHONE ENCOUNTER
Patient has a 3 month DM check scheduled for 10/5. She is wondering if she needs to do fasting bloodwork at this appointment. If so she would like it rescheduled to an earlier morning appt.

## 2021-09-01 NOTE — TELEPHONE ENCOUNTER
Yes, the patient will need fasting blood work for this next appointment.   Give her the option of coming in sooner for the blood work to be done prior to her appointment

## 2021-09-01 NOTE — TELEPHONE ENCOUNTER
Patient was advised will need fasting blood work so was offered an appt for labs prior but she would prefer to do everything on the same day so as not to make multiple trips, she has been rescheduled for 10/18 to an earlier time slot.

## 2021-09-13 ENCOUNTER — OFFICE VISIT (OUTPATIENT)
Dept: ORTHOPEDIC SURGERY | Age: 81
End: 2021-09-13
Payer: MEDICARE

## 2021-09-13 VITALS — BODY MASS INDEX: 33.57 KG/M2 | WEIGHT: 171 LBS | HEIGHT: 60 IN

## 2021-09-13 DIAGNOSIS — M51.36 DDD (DEGENERATIVE DISC DISEASE), LUMBAR: ICD-10-CM

## 2021-09-13 DIAGNOSIS — G89.4 CHRONIC PAIN SYNDROME: ICD-10-CM

## 2021-09-13 DIAGNOSIS — M54.16 LUMBAR RADICULITIS: ICD-10-CM

## 2021-09-13 PROCEDURE — G8417 CALC BMI ABV UP PARAM F/U: HCPCS | Performed by: PHYSICIAN ASSISTANT

## 2021-09-13 PROCEDURE — 4040F PNEUMOC VAC/ADMIN/RCVD: CPT | Performed by: PHYSICIAN ASSISTANT

## 2021-09-13 PROCEDURE — 1036F TOBACCO NON-USER: CPT | Performed by: PHYSICIAN ASSISTANT

## 2021-09-13 PROCEDURE — G8400 PT W/DXA NO RESULTS DOC: HCPCS | Performed by: PHYSICIAN ASSISTANT

## 2021-09-13 PROCEDURE — 99213 OFFICE O/P EST LOW 20 MIN: CPT | Performed by: PHYSICIAN ASSISTANT

## 2021-09-13 PROCEDURE — G8427 DOCREV CUR MEDS BY ELIG CLIN: HCPCS | Performed by: PHYSICIAN ASSISTANT

## 2021-09-13 PROCEDURE — 1090F PRES/ABSN URINE INCON ASSESS: CPT | Performed by: PHYSICIAN ASSISTANT

## 2021-09-13 PROCEDURE — 1123F ACP DISCUSS/DSCN MKR DOCD: CPT | Performed by: PHYSICIAN ASSISTANT

## 2021-09-13 RX ORDER — HYDROCODONE BITARTRATE AND ACETAMINOPHEN 5; 325 MG/1; MG/1
1 TABLET ORAL DAILY PRN
Qty: 30 TABLET | Refills: 0 | Status: SHIPPED
Start: 2021-09-27 | End: 2021-10-18 | Stop reason: SDUPTHER

## 2021-09-13 RX ORDER — HYDROCODONE BITARTRATE AND ACETAMINOPHEN 5; 325 MG/1; MG/1
1 TABLET ORAL DAILY PRN
Qty: 30 TABLET | Refills: 0 | Status: SHIPPED | OUTPATIENT
Start: 2021-11-23 | End: 2021-09-13 | Stop reason: SDUPTHER

## 2021-09-13 RX ORDER — HYDROCODONE BITARTRATE AND ACETAMINOPHEN 5; 325 MG/1; MG/1
1 TABLET ORAL DAILY PRN
Qty: 30 TABLET | Refills: 0 | Status: SHIPPED
Start: 2021-11-23 | End: 2021-10-18 | Stop reason: SDUPTHER

## 2021-09-13 RX ORDER — HYDROCODONE BITARTRATE AND ACETAMINOPHEN 5; 325 MG/1; MG/1
1 TABLET ORAL DAILY PRN
Qty: 30 TABLET | Refills: 0 | Status: SHIPPED | OUTPATIENT
Start: 2021-09-27 | End: 2021-09-13 | Stop reason: SDUPTHER

## 2021-09-13 RX ORDER — PANTOPRAZOLE SODIUM 40 MG/1
TABLET, DELAYED RELEASE ORAL
COMMUNITY
Start: 2021-09-02

## 2021-09-13 RX ORDER — HYDROCODONE BITARTRATE AND ACETAMINOPHEN 5; 325 MG/1; MG/1
1 TABLET ORAL DAILY PRN
Qty: 30 TABLET | Refills: 0 | Status: SHIPPED | OUTPATIENT
Start: 2021-10-25 | End: 2021-11-24

## 2021-09-13 RX ORDER — ONDANSETRON 4 MG/1
TABLET, ORALLY DISINTEGRATING ORAL
COMMUNITY
Start: 2021-07-02
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
## 2021-10-05 NOTE — TELEPHONE ENCOUNTER
Miguel Ramires is requesting refill(s)   Last OV 06/30/21 (pertaining to medication)  LR 07/28/21 (per medication requested)  Next office visit scheduled or attempted Yes   If no, reason:  10/18/21

## 2021-10-18 ENCOUNTER — OFFICE VISIT (OUTPATIENT)
Dept: FAMILY MEDICINE CLINIC | Age: 81
End: 2021-10-18
Payer: MEDICARE

## 2021-10-18 VITALS
HEIGHT: 60 IN | BODY MASS INDEX: 33.57 KG/M2 | WEIGHT: 171 LBS | SYSTOLIC BLOOD PRESSURE: 138 MMHG | HEART RATE: 85 BPM | OXYGEN SATURATION: 97 % | DIASTOLIC BLOOD PRESSURE: 60 MMHG

## 2021-10-18 DIAGNOSIS — I10 ESSENTIAL HYPERTENSION: ICD-10-CM

## 2021-10-18 DIAGNOSIS — E78.5 DYSLIPIDEMIA WITH HIGH LDL AND LOW HDL: ICD-10-CM

## 2021-10-18 DIAGNOSIS — M51.36 DDD (DEGENERATIVE DISC DISEASE), LUMBAR: ICD-10-CM

## 2021-10-18 DIAGNOSIS — Z87.891 PERSONAL HISTORY OF TOBACCO USE: ICD-10-CM

## 2021-10-18 DIAGNOSIS — I35.0 AORTIC VALVE STENOSIS, ETIOLOGY OF CARDIAC VALVE DISEASE UNSPECIFIED: ICD-10-CM

## 2021-10-18 DIAGNOSIS — F32.89 OTHER DEPRESSION: ICD-10-CM

## 2021-10-18 DIAGNOSIS — Z23 NEED FOR INFLUENZA VACCINATION: ICD-10-CM

## 2021-10-18 DIAGNOSIS — E11.9 TYPE 2 DIABETES MELLITUS WITHOUT COMPLICATION, WITHOUT LONG-TERM CURRENT USE OF INSULIN (HCC): Primary | ICD-10-CM

## 2021-10-18 DIAGNOSIS — R07.9 CHEST PAIN, UNSPECIFIED TYPE: ICD-10-CM

## 2021-10-18 LAB
A/G RATIO: 1.6 (ref 1.1–2.2)
ALBUMIN SERPL-MCNC: 4.7 G/DL (ref 3.4–5)
ALP BLD-CCNC: 55 U/L (ref 40–129)
ALT SERPL-CCNC: 20 U/L (ref 10–40)
ANION GAP SERPL CALCULATED.3IONS-SCNC: 15 MMOL/L (ref 3–16)
AST SERPL-CCNC: 15 U/L (ref 15–37)
BILIRUB SERPL-MCNC: 0.3 MG/DL (ref 0–1)
BUN BLDV-MCNC: 15 MG/DL (ref 7–20)
CALCIUM SERPL-MCNC: 10 MG/DL (ref 8.3–10.6)
CHLORIDE BLD-SCNC: 96 MMOL/L (ref 99–110)
CHOLESTEROL, TOTAL: 161 MG/DL (ref 0–199)
CO2: 27 MMOL/L (ref 21–32)
CREAT SERPL-MCNC: 0.5 MG/DL (ref 0.6–1.2)
GFR AFRICAN AMERICAN: >60
GFR NON-AFRICAN AMERICAN: >60
GLOBULIN: 3 G/DL
GLUCOSE BLD-MCNC: 140 MG/DL (ref 70–99)
HBA1C MFR BLD: 7.2 %
HDLC SERPL-MCNC: 32 MG/DL (ref 40–60)
LDL CHOLESTEROL CALCULATED: 69 MG/DL
POTASSIUM SERPL-SCNC: 4 MMOL/L (ref 3.5–5.1)
SODIUM BLD-SCNC: 138 MMOL/L (ref 136–145)
TOTAL PROTEIN: 7.7 G/DL (ref 6.4–8.2)
TRIGL SERPL-MCNC: 299 MG/DL (ref 0–150)
VLDLC SERPL CALC-MCNC: 60 MG/DL

## 2021-10-18 PROCEDURE — 3051F HG A1C>EQUAL 7.0%<8.0%: CPT | Performed by: FAMILY MEDICINE

## 2021-10-18 PROCEDURE — G0008 ADMIN INFLUENZA VIRUS VAC: HCPCS | Performed by: FAMILY MEDICINE

## 2021-10-18 PROCEDURE — G8484 FLU IMMUNIZE NO ADMIN: HCPCS | Performed by: FAMILY MEDICINE

## 2021-10-18 PROCEDURE — 36415 COLL VENOUS BLD VENIPUNCTURE: CPT | Performed by: FAMILY MEDICINE

## 2021-10-18 PROCEDURE — 4040F PNEUMOC VAC/ADMIN/RCVD: CPT | Performed by: FAMILY MEDICINE

## 2021-10-18 PROCEDURE — 1090F PRES/ABSN URINE INCON ASSESS: CPT | Performed by: FAMILY MEDICINE

## 2021-10-18 PROCEDURE — 1036F TOBACCO NON-USER: CPT | Performed by: FAMILY MEDICINE

## 2021-10-18 PROCEDURE — 83036 HEMOGLOBIN GLYCOSYLATED A1C: CPT | Performed by: FAMILY MEDICINE

## 2021-10-18 PROCEDURE — 90694 VACC AIIV4 NO PRSRV 0.5ML IM: CPT | Performed by: FAMILY MEDICINE

## 2021-10-18 PROCEDURE — 1123F ACP DISCUSS/DSCN MKR DOCD: CPT | Performed by: FAMILY MEDICINE

## 2021-10-18 PROCEDURE — G8417 CALC BMI ABV UP PARAM F/U: HCPCS | Performed by: FAMILY MEDICINE

## 2021-10-18 PROCEDURE — G8427 DOCREV CUR MEDS BY ELIG CLIN: HCPCS | Performed by: FAMILY MEDICINE

## 2021-10-18 PROCEDURE — G8400 PT W/DXA NO RESULTS DOC: HCPCS | Performed by: FAMILY MEDICINE

## 2021-10-18 PROCEDURE — 99215 OFFICE O/P EST HI 40 MIN: CPT | Performed by: FAMILY MEDICINE

## 2021-10-18 RX ORDER — BUDESONIDE AND FORMOTEROL FUMARATE DIHYDRATE 160; 4.5 UG/1; UG/1
2 AEROSOL RESPIRATORY (INHALATION) 2 TIMES DAILY
Qty: 10.2 G | Refills: 3 | Status: SHIPPED | OUTPATIENT
Start: 2021-10-18

## 2021-10-18 RX ORDER — SIMVASTATIN 40 MG
40 TABLET ORAL NIGHTLY
Qty: 90 TABLET | Refills: 1 | Status: SHIPPED | OUTPATIENT
Start: 2021-10-18 | End: 2021-11-29 | Stop reason: ALTCHOICE

## 2021-10-18 RX ORDER — ESCITALOPRAM OXALATE 20 MG/1
20 TABLET ORAL DAILY
Qty: 90 TABLET | Refills: 1 | Status: SHIPPED | OUTPATIENT
Start: 2021-10-18 | End: 2022-09-19

## 2021-10-18 NOTE — PROGRESS NOTES
Vaccine Information Sheet, \"Influenza - Inactivated\"  given to Weston Kayser, or parent/legal guardian of  Weston Kayser and verbalized understanding. Patient responses:    Have you ever had a reaction to a flu vaccine? No  Do you have any current illness? No  Have you ever had Guillian Sundown Syndrome? No  Do you have a serious allergy to any of the follow: Neomycin, Polymyxin, Thimerosal, eggs or egg products? No    Flu vaccine given per order. Please see immunization tab. Risks and benefits explained. Current VIS given.

## 2021-10-18 NOTE — PROGRESS NOTES
Low Dose CT (LDCT) Lung Screening criteria met:     Age 55-77(Medicare) or 50-80 ([facility])   Pack year smoking >30 (Medicare) or >20 ([facility])   Still smoking or less than 15 year since quit   No sign or symptoms of lung cancer   > 11 months since last LDCT     Risks and benefits of lung cancer screening with LDCT scans discussed:    Significance of positive screen - False-positive LDCT results often occur. 95% of all positive results do not lead to a diagnosis of cancer. Usually further imaging can resolve most false-positive results; however, some patients may require invasive procedures. Over diagnosis risk - 10% to 12% of screen-detected lung cancer cases are over diagnosed--that is, the cancer would not have been detected in the patient's lifetime without the screening. Need for follow up screens annually to continue lung cancer screening effectiveness     Risks associated with radiation from annual LDCT- Radiation exposure is about the same as for a mammogram, which is about 1/3 of the annual background radiation exposure from everyday life. Starting screening at age 54 is not likely to increase cancer risk from radiation exposure. Patients with comorbidities resulting in life expectancy of < 10 years, or that would preclude treatment of an abnormality identified on CT, should not be screened due to lack of benefit. To obtain maximal benefit from this screening, smoking cessation and long-term abstinence from smoking is critical    Jaime Theodore presents for   Chief Complaint   Patient presents with    Diabetes     pt states that she is here fora  3 month f/u, is fasting for bw     Hyperlipidemia    Hypertension    Back Pain    Depression        ASSESSMENT:   Diagnosis Orders   1. Type 2 diabetes mellitus without complication, without long-term current use of insulin ([address] A1c the same at 7.2. Patient will continue current dose of Metformin POCT glycosylated hemoglobin (Hb A1C)   2. Essential hypertension, stable on current regimen Comprehensive Metabolic Panel   3. Other depression, stable continue Lexapro escitalopram (LEXAPRO) 20 MG tablet   4. Aortic valve stenosis, etiology of cardiac valve disease unspecified, monitored by echocardiogram    5. Dyslipidemia with high LDL and low HDL, labs are pending continue statin simvastatin (ZOCOR) 40 MG tablet    Lipid Panel   6. DDD (degenerative disc disease), lumbar, plan per orthopedics    7. Need for influenza vaccination  INFLUENZA, QUADV, ADJUVANTED, 65 YRS =, IM, PF, PREFILL SYR, 0.5ML (FLUAD)   8. Chest pain, unspecified type, patient has this persistent pain that starts in her chest and goes through to her back she has had this for quite some time now. She was evaluated by GI. Esophagogastroduodenoscopy results and biopsy results reviewed. Patient had a chest x-ray at the last appointment that was normal lab including CBC was normal.  Uncertain etiology of this symptom. We will further pursue with chest CT given her extensive smoking history. We will follow up after chest CT results are completed    9. Personal history of tobacco use, patient had a baseline in 2019. These results were reviewed [place] VISIT TO DISCUSS LUNG CA SCREEN W LDCT        Plan:  1)  Medication: continue current medication regimen unchanged, medications are working and tolerated, continue as listed  2)  Recheck in 3 months, sooner should new symptoms or problems arise. 3) LLR      I spent a total of 40* minutes with the patient, reviewing previous notes, consults, test results, imaging ,discussing chronic and acute conditions      SUBJECTIVE:  Tree Pineda is a 80 y.o. female who presents for evaluation of diabetes, depression, murmur, chronic back pain, hypertension and hyperlipidemia. She indicates that she is feeling well and denies any symptoms referable to her elevated blood pressure.    Specifically denies chest pain, palpitations, , orthopnea, PND or peripheral edema or neuro sx. No anorexia,  or leg cramps noted. Current medication regimen is as listed below. She denies any side effects of medication, and has been taking it regularly. Lab Results   Component Value Date    LDLCALC 77 03/17/2021    LDLDIRECT 123 (H) 01/03/2018       Patient was last seen in June. At that time her A1c was 7.2. She says that her glucometer meter broke a month ago so she has not been monitoring her sugars. She is currently taking Metformin at 1000 mg twice a day. Her A1c was the same at 7.2. Patient does not monitor her blood pressure. Her depression is stable on Lexapro 20 mg. She has a heart murmur, she had a echocardiogram done in April which shows aortic stenosis mild. She is fasting for her cholesterol. Patient has had shortness of breath. At her last appointment she was given a sample of Breztri. She says this medicine did help. Unfortunately, this is not covered under her insurance so she was given a new prescription for Symbicort 160. Patient sees orthopedics for management of chronic lower back pain for which she takes hydrocodone as needed. Since patient's last appointment she had an esophagogastroduodenoscopy done by Dr. Elba Ganser. Biopsies of her esophagus and stomach were negative. She was told to continue on her PPI. There are 2 PPIs listed in her chart she is unsure which ones she is taking. After we reviewed that her esophagogastroduodenoscopy was normal.  She wants to know why she continues to have this epigastric/chest pain. She says it goes straight through to her back. Patient last had a lung cancer screening CAT scan done in 2019. We will repeat this. Her discomfort is right at the lower sternal area.       Current Outpatient Medications   Medication Sig Dispense Refill    simvastatin (ZOCOR) 40 MG tablet Take 1 tablet by mouth nightly 90 tablet 1    escitalopram (LEXAPRO) 20 MG tablet Take 1 tablet by mouth daily 90 tablet 1    budesonide-formoterol (SYMBICORT) 160-4.5 MCG/ACT AERO Inhale 2 puffs into the lungs 2 times daily 10.2 g 3    metFORMIN (GLUCOPHAGE) 1000 MG tablet TAKE ONE TABLET BY MOUTH TWICE A DAY WITH MEALS 60 tablet 1    losartan-hydroCHLOROthiazide (HYZAAR) 100-12.5 MG per tablet TAKE ONE TABLET BY MOUTH DAILY 90 tablet 0    [START ON 10/25/2021] HYDROcodone-acetaminophen (NORCO) 5-325 MG per tablet Take 1 tablet by mouth daily as needed for Pain for up to 30 days. 30 tablet 0    pantoprazole (PROTONIX) 40 MG tablet       ondansetron (ZOFRAN-ODT) 4 MG disintegrating tablet       valACYclovir (VALTREX) 1 g tablet Take 1 tablet by mouth every 12 hours 20 tablet 1    omeprazole (PRILOSEC) 20 MG delayed release capsule Take 1 capsule by mouth daily 30 capsule 3    Collagen Hydrolysate POWD by Does not apply route      Omega-3 Fatty Acids (FISH OIL) 875 MG CAPS Take by mouth      dicyclomine (BENTYL) 10 MG capsule Take 1 capsule by mouth 4 times daily as needed (cramping) 90 capsule 0    Budeson-Glycopyrrol-Formoterol (BREZTRI AEROSPHERE) 160-9-4.8 MCG/ACT AERO Inhale 2 puffs into the lungs 2 times daily 1 Inhaler 0     No current facility-administered medications for this visit. Allergies   Allergen Reactions    Diovan [Valsartan] Anaphylaxis     Throat swelling      Bee Venom     Formaldehyde Itching    Iodine Hives     IVP dye    Lisinopril      Throat swelling  Angioedema      Niaspan [Niacin Er]      facial rash and itching    Amoxicillin Rash, Hives and Itching       Social History     Tobacco Use    Smoking status: Former Smoker     Packs/day: 1.00     Years: 50.00     Pack years: 50.00     Types: Cigarettes     Start date: 5     Quit date: 2017     Years since quittin.4    Smokeless tobacco: Never Used    Tobacco comment: 16- using chantix again as starting to sneak a few cigarettes. quit again with Chantix in . smoking again 19.  \"quit\" 17, but still occ cigarette   Substance Use Topics    Alcohol use: No     Alcohol/week: 0.0 standard drinks       OBJECTIVE:   /60   Pulse 85   Ht 4' 11.5\" (1.511 m)   Wt 171 lb (77.6 kg)   SpO2 97%   BMI 33.96 kg/m²   NAD  Neck no bruit or JVD  S1 and S2 normal, 2/6 murmurs, no  clicks, gallops or rubs. Regular rate and rhythm. Chest is clear; no wheezes or rales. No edema or JVD. Abdomen obese soft she has some tenderness in the epigastric area with light palpation. She is also tender on the left lateral paraspinal area in the thoracic region  Neuro alert, no CN or motor deficits  Psych nl mood, thought and judgement                EMR Dragon/transcription disclaimer:  Much of this encounter note is electronic transcription/translation of spoken language to printed texts. The electronic translation of spoken language may be erroneous, or at times, nonsensical words or phrases may be inadvertently transcribed.   Although I have reviewed the note for such errors, some may still exist.

## 2021-10-28 ENCOUNTER — HOSPITAL ENCOUNTER (OUTPATIENT)
Dept: CT IMAGING | Age: 81
Discharge: HOME | End: 2021-10-28
Payer: MEDICARE

## 2021-10-28 DIAGNOSIS — Z87.891 PERSONAL HISTORY OF TOBACCO USE: ICD-10-CM

## 2021-10-28 PROCEDURE — 71271 CT THORAX LUNG CANCER SCR C-: CPT

## 2021-10-29 DIAGNOSIS — I25.10 CORONARY ARTERY DISEASE DUE TO CALCIFIED CORONARY LESION: Primary | ICD-10-CM

## 2021-10-29 DIAGNOSIS — I25.84 CORONARY ARTERY DISEASE DUE TO CALCIFIED CORONARY LESION: Primary | ICD-10-CM

## 2021-11-03 ENCOUNTER — TELEPHONE (OUTPATIENT)
Dept: CASE MANAGEMENT | Age: 81
End: 2021-11-03

## 2021-11-03 ENCOUNTER — TELEPHONE (OUTPATIENT)
Dept: PULMONOLOGY | Age: 81
End: 2021-11-03

## 2021-11-03 DIAGNOSIS — R91.1 LUNG NODULE SEEN ON IMAGING STUDY: Primary | ICD-10-CM

## 2021-11-03 NOTE — PROGRESS NOTES
Message was sent from the nurse navigator for lung cancer screening. It is recommended that the patient have a follow-up CAT scan in 3 months. I spoke with the patient and reviewed these results with her. I am also referring her to Dr. Edson Meyers with pulmonology for further management of her lung nodule.   She will call his office to schedule

## 2021-11-03 NOTE — TELEPHONE ENCOUNTER
Spoke to Sofía Haywood and to the patient. Patient was given a referral to pulmonology. She has an appointment with me in January.  We will order the 3-month follow-up CAT scan at that time

## 2021-11-03 NOTE — TELEPHONE ENCOUNTER
Per imaging report ANNUAL LUNG SCREENING 10/28/2021 with F/U imging recommendations:    New noncalcified nodule in the left lower lobe measuring 7 mm.  There is mild   underlying emphysema. Follow Up LDCT in 3 Months      May also consider Pulmonology Referral for lung nodule greater than 6mm.     Thank you,  Abigail Glynn RN  [facility] Lung Navigator  [phone]

## 2021-11-04 ENCOUNTER — OFFICE VISIT (OUTPATIENT)
Dept: PULMONOLOGY | Age: 81
End: 2021-11-04
Payer: MEDICARE

## 2021-11-04 VITALS
HEART RATE: 88 BPM | RESPIRATION RATE: 18 BRPM | BODY MASS INDEX: 34 KG/M2 | TEMPERATURE: 95.6 F | DIASTOLIC BLOOD PRESSURE: 72 MMHG | SYSTOLIC BLOOD PRESSURE: 138 MMHG | HEIGHT: 60 IN | WEIGHT: 173.2 LBS | OXYGEN SATURATION: 96 %

## 2021-11-04 DIAGNOSIS — R91.1 LUNG NODULE: Primary | ICD-10-CM

## 2021-11-04 DIAGNOSIS — G47.10 HYPERSOMNOLENCE: ICD-10-CM

## 2021-11-04 DIAGNOSIS — G47.33 OBSTRUCTIVE SLEEP APNEA: ICD-10-CM

## 2021-11-04 DIAGNOSIS — J44.9 CHRONIC OBSTRUCTIVE PULMONARY DISEASE, UNSPECIFIED COPD TYPE (HCC): ICD-10-CM

## 2021-11-04 DIAGNOSIS — Z87.891 HISTORY OF TOBACCO ABUSE: ICD-10-CM

## 2021-11-04 DIAGNOSIS — J32.9 SINUSITIS, UNSPECIFIED CHRONICITY, UNSPECIFIED LOCATION: ICD-10-CM

## 2021-11-04 PROCEDURE — 99204 OFFICE O/P NEW MOD 45 MIN: CPT | Performed by: INTERNAL MEDICINE

## 2021-11-04 PROCEDURE — G8427 DOCREV CUR MEDS BY ELIG CLIN: HCPCS | Performed by: INTERNAL MEDICINE

## 2021-11-04 PROCEDURE — G8400 PT W/DXA NO RESULTS DOC: HCPCS | Performed by: INTERNAL MEDICINE

## 2021-11-04 PROCEDURE — G8484 FLU IMMUNIZE NO ADMIN: HCPCS | Performed by: INTERNAL MEDICINE

## 2021-11-04 PROCEDURE — 1090F PRES/ABSN URINE INCON ASSESS: CPT | Performed by: INTERNAL MEDICINE

## 2021-11-04 PROCEDURE — G8417 CALC BMI ABV UP PARAM F/U: HCPCS | Performed by: INTERNAL MEDICINE

## 2021-11-04 PROCEDURE — G8926 SPIRO NO PERF OR DOC: HCPCS | Performed by: INTERNAL MEDICINE

## 2021-11-04 PROCEDURE — 4040F PNEUMOC VAC/ADMIN/RCVD: CPT | Performed by: INTERNAL MEDICINE

## 2021-11-04 PROCEDURE — 1036F TOBACCO NON-USER: CPT | Performed by: INTERNAL MEDICINE

## 2021-11-04 PROCEDURE — 1123F ACP DISCUSS/DSCN MKR DOCD: CPT | Performed by: INTERNAL MEDICINE

## 2021-11-04 PROCEDURE — 3023F SPIROM DOC REV: CPT | Performed by: INTERNAL MEDICINE

## 2021-11-04 RX ORDER — ZOLPIDEM TARTRATE 10 MG/1
10 TABLET ORAL NIGHTLY PRN
Qty: 3 TABLET | Refills: 0 | Status: SHIPPED | OUTPATIENT
Start: 2021-11-04 | End: 2021-11-07

## 2021-11-04 RX ORDER — IPRATROPIUM BROMIDE 21 UG/1
2 SPRAY, METERED NASAL EVERY 12 HOURS
Qty: 30 ML | Refills: 3 | Status: SHIPPED | OUTPATIENT
Start: 2021-11-04

## 2021-11-04 ASSESSMENT — ENCOUNTER SYMPTOMS
SPUTUM PRODUCTION: 0
ABDOMINAL PAIN: 0
ORTHOPNEA: 0
ALLERGIC/IMMUNOLOGIC NEGATIVE: 1
SWOLLEN GLANDS: 0
WHEEZING: 0
EYES NEGATIVE: 1
SORE THROAT: 0
GASTROINTESTINAL NEGATIVE: 1
SHORTNESS OF BREATH: 1
VOMITING: 0
HEMOPTYSIS: 0
RHINORRHEA: 0

## 2021-11-04 NOTE — PROGRESS NOTES
are normal. There is no distension. Tenderness: There is no abdominal tenderness. There is no guarding. Musculoskeletal:         General: No swelling or tenderness. Normal range of motion. Cervical back: Normal range of motion and neck supple. No rigidity. Skin:     General: Skin is warm and dry. Coloration: Skin is not jaundiced. Neurological:      General: No focal deficit present. Mental Status: She is alert and oriented to person, place, and time. Mental status is at baseline. Cranial Nerves: No cranial nerve deficit. Sensory: No sensory deficit. Motor: No weakness. Gait: Gait normal.   Psychiatric:         Mood and Affect: Mood normal.         Thought Content:  Thought content normal.         Judgment: Judgment normal.                  An electronic signature was used to authenticate this note.    --Chandu Fraser MD

## 2021-11-04 NOTE — PATIENT INSTRUCTIONS
ASSESSMENT/PLAN:  1. Lung nodule  2. History of tobacco abuse  3. Sinusitis, unspecified chronicity, unspecified location  4. Chronic obstructive pulmonary disease, unspecified COPD type ([facility] [address]  5. Obstructive sleep apnea  6. Hypersomnolence          Low-dose CT scan done 10/28/2021     Impression   New noncalcified nodule in the left lower lobe measuring 7 mm.  There is mild   underlying emphysema.       Severe coronary artery calcification       LUNG RADS:   Per ACR Lung-RADS Version 1.1       Category 4A,   This is the 7 mm nodule                Will get ct scan in 3 months      Sinusitis  Will give  Nasal atrovent 0.03% and 1-2 sprays, at night and up to 3-4 times if  Needed      MICHAEL/hypersomnolence  ESS is 12/24  Neck 15.5 inches  Will get sleep study  I will call with results  Will give ambien 10 mg 3 tab to use if needed        COPD  Will get PFT  Call back for results        RTC in 3 months    Remember to bring a list of pulmonary medications and any CPAP or BiPAP machines to your next appointment with the office. Please keep all of your future appointments scheduled by [address]. Out of respect for other patients and providers, you may be asked to reschedule your appointment if you arrive later than your scheduled appointment time. Appointments cancelled less than 24hrs in advance will be considered a no show. Patients with three missed appointments within 1 year or four missed appointments within 2 years can be dismissed from the practice. Please be aware that our physicians are required to work in the Intensive Care Unit at [facility].  Your appointment may need to be rescheduled if they are designated to work during your appointment time. You may receive a survey regarding the care you received during your visit. Your input is valuable to us. We encourage you to complete and return your survey.   We hope you will choose us in the future

## 2021-11-04 NOTE — LETTER
11/4/21        Jairo Hernandez      I have seen this patient in the office today and wanted to communicate my findings and recommendations. Patient Instructions     ASSESSMENT/PLAN:  1. Lung nodule  2. History of tobacco abuse  3. Sinusitis, unspecified chronicity, unspecified location  4. Chronic obstructive pulmonary disease, unspecified COPD type ([facility] [address]  5. Obstructive sleep apnea  6.  Hypersomnolence          Low-dose CT scan done 10/28/2021     Impression   New noncalcified nodule in the left lower lobe measuring 7 mm.  There is mild   underlying emphysema.       Severe coronary artery calcification       LUNG RADS:   Per ACR Lung-RADS Version 1.1       Category 4A,   This is the 7 mm nodule                Will get ct scan in 3 months      Sinusitis  Will give  Nasal atrovent 0.03% and 1-2 sprays, at night and up to 3-4 times if  Needed      MICHAEL/hypersomnolence  ESS is 12/24  Neck 15.5 inches  Will get sleep study  I will call with results  Will give ambien 10 mg 3 tab to use if needed        COPD  Will get PFT  Call back for results        RTC in 3 months                     Thank you for allowing me to assist in the care of the Shanda Warner MD

## 2021-11-19 ENCOUNTER — HOSPITAL ENCOUNTER (OUTPATIENT)
Dept: PULMONOLOGY | Age: 81
Discharge: HOME | End: 2021-11-19
Payer: MEDICARE

## 2021-11-19 VITALS — OXYGEN SATURATION: 95 %

## 2021-11-19 DIAGNOSIS — J44.9 CHRONIC OBSTRUCTIVE PULMONARY DISEASE, UNSPECIFIED COPD TYPE (HCC): ICD-10-CM

## 2021-11-19 DIAGNOSIS — R91.1 LUNG NODULE: ICD-10-CM

## 2021-11-19 DIAGNOSIS — Z87.891 HISTORY OF TOBACCO ABUSE: ICD-10-CM

## 2021-11-19 LAB
DLCO %PRED: 79 %
DLCO PRED: NORMAL
DLCO/VA %PRED: NORMAL
DLCO/VA PRED: NORMAL
DLCO/VA: NORMAL
DLCO: NORMAL
EXPIRATORY TIME-POST: NORMAL
EXPIRATORY TIME: NORMAL
FEF 25-75% %CHNG: NORMAL
FEF 25-75% %PRED-POST: NORMAL
FEF 25-75% %PRED-PRE: NORMAL
FEF 25-75% PRED: NORMAL
FEF 25-75%-POST: NORMAL
FEF 25-75%-PRE: NORMAL
FEV1 %PRED-POST: 88 %
FEV1 %PRED-PRE: 82 %
FEV1 PRED: NORMAL
FEV1-POST: NORMAL
FEV1-PRE: NORMAL
FEV1/FVC %PRED-POST: NORMAL
FEV1/FVC %PRED-PRE: NORMAL
FEV1/FVC PRED: NORMAL
FEV1/FVC-POST: 68 %
FEV1/FVC-PRE: 70 %
FVC %PRED-POST: 95 L
FVC %PRED-PRE: 86 %
FVC PRED: NORMAL
FVC-POST: NORMAL
FVC-PRE: NORMAL
GAW %PRED: NORMAL
GAW PRED: NORMAL
GAW: NORMAL
IC %PRED: NORMAL
IC PRED: NORMAL
IC: NORMAL
MEP: NORMAL
MIP: NORMAL
MVV %PRED-PRE: NORMAL
MVV PRED: NORMAL
MVV-PRE: NORMAL
PEF %PRED-POST: NORMAL
PEF %PRED-PRE: NORMAL
PEF PRED: NORMAL
PEF%CHNG: NORMAL
PEF-POST: NORMAL
PEF-PRE: NORMAL
RAW %PRED: NORMAL
RAW PRED: NORMAL
RAW: NORMAL
RV %PRED: NORMAL
RV PRED: NORMAL
RV: NORMAL
SVC %PRED: NORMAL
SVC PRED: NORMAL
SVC: NORMAL
TLC %PRED: 101 %
TLC PRED: NORMAL
TLC: NORMAL
VA %PRED: NORMAL
VA PRED: NORMAL
VA: NORMAL
VTG %PRED: NORMAL
VTG PRED: NORMAL
VTG: NORMAL

## 2021-11-19 PROCEDURE — 94760 N-INVAS EAR/PLS OXIMETRY 1: CPT

## 2021-11-19 PROCEDURE — 94060 EVALUATION OF WHEEZING: CPT

## 2021-11-19 PROCEDURE — 6370000000 HC RX 637 (ALT 250 FOR IP): Performed by: INTERNAL MEDICINE

## 2021-11-19 PROCEDURE — 94726 PLETHYSMOGRAPHY LUNG VOLUMES: CPT

## 2021-11-19 PROCEDURE — 94729 DIFFUSING CAPACITY: CPT

## 2021-11-19 RX ORDER — ALBUTEROL SULFATE 90 UG/1
4 AEROSOL, METERED RESPIRATORY (INHALATION) ONCE
Status: COMPLETED | OUTPATIENT
Start: 2021-11-19 | End: 2021-11-19

## 2021-11-19 RX ADMIN — Medication 4 PUFF: at 10:49

## 2021-11-19 ASSESSMENT — PULMONARY FUNCTION TESTS
FEV1/FVC_POST: 68
FEV1_PERCENT_PREDICTED_PRE: 82
FVC_PERCENT_PREDICTED_PRE: 86
FVC_PERCENT_PREDICTED_POST: 95
FEV1_PERCENT_PREDICTED_POST: 88
FEV1/FVC_PRE: 70

## 2021-11-22 NOTE — PROCEDURES
[address] [address]                               PULMONARY FUNCTION    PATIENT NAME: Chelsea Molina                     :        1940  MED REC NO:   [id number]                          ROOM:  ACCOUNT NO:   [de-identified]                           ADMIT DATE: 2021  PROVIDER:     Monica Hinds MD    DATE OF PROCEDURE:  2021    PFT INTERPRETATION    The patient is a 30-year-old female who underwent a PFT for lung nodule. Spirometry shows FVC to be 86%, FEV1 to be 82%, FEV1 to FVC ratio was  94%, FEF25%-75% was 59%. The patient did not have any significant  postbronchodilator improvement. Lung volume shows the total lung  capacity was normal.  The patient does have some air trapping. The  patient also has decrease in ERV. The patient's diffusion capacity when  adjusted for volume was normal.  The patient's flow-volume loop was  normal.  The patient also had a PFT done in 2018. At that time, the  patient's FVC was 72%, FEV1 was 70%, FEV1 to FVC ratio of 97%,  FEF25%-75% was 57% at that time. The patient's total lung capacity was  97% at that time and the patient's diffusion capacity when adjusted for  volume was increased at that time. On the basis of this PFT, the  patient has no significant obstructive airway disease in the large  airways or any reactive airway disease. The patient has slight air  trapping,, which may be suggestive of emphysema or erythema, has a  tendency towards emphysema or asthma and diffusion capacity was normal  and as compared to 2018, the patient's FVC and FEV1 have improved. Please correlate clinically.         Tony Charles MD    D: 2021 9:16:09       T: 2021 9:18:37     [id number]  Job#: [id number]     Doc#: [id number]    CC:

## 2021-11-23 ENCOUNTER — HOSPITAL ENCOUNTER (OUTPATIENT)
Dept: SLEEP CENTER | Age: 81
Discharge: HOME | End: 2021-11-25
Payer: MEDICARE

## 2021-11-23 DIAGNOSIS — G47.33 OBSTRUCTIVE SLEEP APNEA: ICD-10-CM

## 2021-11-23 DIAGNOSIS — G47.10 HYPERSOMNOLENCE: ICD-10-CM

## 2021-11-23 PROCEDURE — 95810 POLYSOM 6/> YRS 4/> PARAM: CPT

## 2021-11-24 ENCOUNTER — TELEPHONE (OUTPATIENT)
Dept: PULMONOLOGY | Age: 81
End: 2021-11-24

## 2021-11-24 PROCEDURE — 95810 POLYSOM 6/> YRS 4/> PARAM: CPT | Performed by: INTERNAL MEDICINE

## 2021-11-24 NOTE — TELEPHONE ENCOUNTER
DATE OF PROCEDURE:  11/19/2021     PFT INTERPRETATION     The patient is a 68-year-old female who underwent a PFT for lung nodule. Spirometry shows FVC to be 86%, FEV1 to be 82%, FEV1 to FVC ratio was  94%, FEF25%-75% was 59%. The patient did not have any significant  postbronchodilator improvement. Lung volume shows the total lung  capacity was normal.  The patient does have some air trapping. The  patient also has decrease in ERV. The patient's diffusion capacity when  adjusted for volume was normal.  The patient's flow-volume loop was  normal.  The patient also had a PFT done in 2018. At that time, the  patient's FVC was 72%, FEV1 was 70%, FEV1 to FVC ratio of 97%,  FEF25%-75% was 57% at that time. The patient's total lung capacity was  97% at that time and the patient's diffusion capacity when adjusted for  volume was increased at that time. On the basis of this PFT, the  patient has no significant obstructive airway disease in the large  airways or any reactive airway disease. The patient has slight air  trapping,, which may be suggestive of emphysema or erythema, has a  tendency towards emphysema or asthma and diffusion capacity was normal  and as compared to 2018, the patient's FVC and FEV1 have improved.    Please correlate clinically.           Patient was called and I went over the PFT and the sleep study with her  She does not have any significant PFT findings of COPD may have some air trapping  The sleep study does not show any sleep apnea  Does show some PLM's  However she sleeps with 8 cats in the bed  The nasal Atrovent that had given her worked very well for her sinus issues    I talked to her about seeing her in 3 months after the CAT scan

## 2021-11-24 NOTE — PROGRESS NOTES
[address]      Patient Name:  Trudy Brewster  Requesting Physician: No admitting provider for patient encounter. Primary Care Physician: Angelique Lebron MD    Reason for Consultation/Chief Complaint: Preventive cardiac evaluation, coronary artery calcification    History of Present Illness:    Trudy Brewster is a 80 y.o. patient presenting today for preventive care care visit. The patient had a routine lung CT screening recently performed with incidental findings of aortic valve and severe coronary artery calcification. Today, she reports she is doing well. She is following with pulmonology for the lung nodules and has repeat CT scan planned February. She reports that she has quit smoking since 2017. Previously smoked roughly 60 years 1.5-2 packs/day. She admits to sedentary lifestyle today. She reports she has arthritis which limits her. Reports she used to walk with a friend who is since passed away and she has not gotten back into the habit. She has an elliptical in her home but does not use this. She has no chest pain/pressure/heaviness with the activity she is performing. She is able to perform 13 stairs in the home without limiting cardiac symptoms. Notes mildly dyspneic at the top with no need to stop. No dyspnea at rest. Denies palpitations, near-syncope or angel syncope. She notes some \"dizziness\" every morning which makes her feel unbalanced. No recent falls. Denies paroxysmal nocturnal dyspnea, orthopnea, bendopnea, increasing lower extremity edema or rapid weight gain. She reports that all of this started when she started having pain, in her upper central abdomen. She states she did have an endoscopy which did not show anything. History of Crohn's though she is unsure of this diagnosis.          The patient's risk factors for cardiac disease include the following:  Hypertension  Hyperlipidemia  Diabetes mellitus  Obesity  Sedentary lifestyle   Inflammatory disorder/Crohn's disease  History of rheumatic heart disease        Past Medical History:   has a past medical history of Arthritis, Crohn's disease ([facility] [address] Fracture, foot, Fracture, wrist, Hyperlipidemia, Hypertension, Macular degeneration, Murmur, and Rheumatic fever. Surgical History:   has a past surgical history that includes Tonsillectomy and adenoidectomy (child); Appendectomy; Hysterectomy (age 41); Cervical disc surgery (4/08); joint replacement (2/06); joint replacement (7/05); joint replacement (2/10); Spine surgery (1/10); Cataract removal with implant (8/10); Cataract removal with implant (9/10); Facial cosmetic surgery (11/19/2010); other surgical history (2/12); Abdomen surgery (1967); Shoulder arthroscopy (9/5/12); joint replacement (Right, 12/10/13); other surgical history (Right, 1/23/14 & 2/10/14); Colonoscopy (03/14/2017); shoulder surgery (Left, 07/2017); and Total shoulder arthroplasty (12/11/2017). Social History:   reports that she quit smoking about 4 years ago. Her smoking use included cigarettes. She started smoking about 54 years ago. She has a 50.00 pack-year smoking history. She has never used smokeless tobacco. She reports that she does not drink alcohol and does not use drugs. Family History:  family history includes Cancer in her mother and sister; Diabetes in her maternal grandmother; Hemochromatosis in her maternal grandfather. Current Medications:  Current Outpatient Medications on File Prior to Visit   Medication Sig Dispense Refill    HYDROcodone-acetaminophen (NORCO) 5-325 MG per tablet Take 1 tablet by mouth as needed for Pain.       ipratropium (ATROVENT) 0.03 % nasal spray 2 sprays by Each Nostril route every 12 hours 30 mL 3    simvastatin (ZOCOR) 40 MG tablet Take 1 tablet by mouth nightly 90 tablet 1    escitalopram (LEXAPRO) 20 MG tablet Take 1 tablet by mouth daily 90 tablet 1    budesonide-formoterol (SYMBICORT) 160-4.5 MCG/ACT AERO Inhale 2 puffs into the lungs 2 times daily 10.2 g 3    metFORMIN (GLUCOPHAGE) 1000 MG tablet TAKE ONE TABLET BY MOUTH TWICE A DAY WITH MEALS 60 tablet 1    losartan-hydroCHLOROthiazide (HYZAAR) 100-12.5 MG per tablet TAKE ONE TABLET BY MOUTH DAILY 90 tablet 0    pantoprazole (PROTONIX) 40 MG tablet       ondansetron (ZOFRAN-ODT) 4 MG disintegrating tablet       valACYclovir (VALTREX) 1 g tablet Take 1 tablet by mouth every 12 hours 20 tablet 1    Budeson-Glycopyrrol-Formoterol (BREZTRI AEROSPHERE) 160-9-4.8 MCG/ACT AERO Inhale 2 puffs into the lungs 2 times daily 1 Inhaler 0    omeprazole (PRILOSEC) 20 MG delayed release capsule Take 1 capsule by mouth daily 30 capsule 3    Collagen Hydrolysate POWD by Does not apply route      Omega-3 Fatty Acids (FISH OIL) 875 MG CAPS Take by mouth      dicyclomine (BENTYL) 10 MG capsule Take 1 capsule by mouth 4 times daily as needed (cramping) 90 capsule 0     No current facility-administered medications on file prior to visit. Allergies:  Diovan [valsartan], Bee venom, Formaldehyde, Iodine, Lisinopril, Niaspan [niacin er], and Amoxicillin     Review of Systems:   A 14 point review of symptoms completed. Pertinent positives identified in the HPI, all other review of symptoms negative as below. Objective:  Vitals:    11/29/21 1302 11/29/21 1306   BP: 139/74 (!) 140/74   Pulse: 82    SpO2: 96%    Weight: 175 lb (79.4 kg)    Height: 4' 11.5\" (1.511 m)       Weight: 175 lb (79.4 kg)    There were no vitals filed for this visit. PHYSICAL EXAM:      General:  Alert, cooperative, no distress, appears stated age   Head:  Normocephalic, atraumatic   Eyes:  Conjunctiva/corneas clear, anicteric sclerae    Nose: Nares normal, no drainage or sinus tenderness   Throat: No abnormalities of the lips, oral mucosa or tongue. Moist mucous membranes. Neck: Trachea midline.  Neck supple with no lymphadenopathy, thyroid not enlarged, symmetric, no tenderness/mass/nodules, no Jugular venous pressure elevation    Lungs:   Clear to auscultation bilaterally, no wheezes, no rales, no respiratory distress   Chest Wall:  No deformity or tenderness to palpation   Heart:  Regular rate and rhythm, normal S1, normal S2, 2/6 systolic murmur, right sternal border, no rub, no S3/S4, PMI non-displaced. Abdomen:    Obese abdomen, soft, non-tender, with normoactive bowel sounds. No masses, no hepatosplenomegaly   Extremities: No cyanosis, clubbing, trace pitting edema. Vascular: 2+ radial, dorsalis pedis and posterior tibial pulses bilaterally. Brisk carotid upstrokes without carotid bruit. Skin: Skin color, texture, turgor are normal with no rashes or ulceration. Psych: Euthymic mood, appropriate affect   Neurologic: Oriented to person, place and time. No slurred speech or facial asymmetry. No motor or sensory deficits on gross examination.         Labs:  CBC:    Lab Results   Component Value Date    WBC 9.1 06/30/2021    RBC 4.37 06/30/2021    HGB 12.9 06/30/2021    HCT 37.7 06/30/2021    MCV 86.3 06/30/2021    RDW 13.8 06/30/2021     06/30/2021     CMP:  Lab Results   Component Value Date     10/18/2021    K 4.0 10/18/2021    CL 96 10/18/2021    CO2 27 10/18/2021    BUN 15 10/18/2021    CREATININE 0.5 10/18/2021    GFRAA >60 10/18/2021    GFRAA >60 06/16/2010    AGRATIO 1.6 10/18/2021    LABGLOM >60 10/18/2021    LABGLOM 93 10/03/2014    GLUCOSE 140 10/18/2021    GLUCOSE 88 07/22/2011    PROT 7.7 10/18/2021    PROT 7.6 02/08/2013    CALCIUM 10.0 10/18/2021    BILITOT 0.3 10/18/2021    ALKPHOS 55 10/18/2021    AST 15 10/18/2021    ALT 20 10/18/2021     PT/INR:  No results found for: PTINR  HgBA1c:    Lab Results   Component Value Date    LABA1C 7.2 10/18/2021     Lipid:    Lab Results   Component Value Date    HDL 32 10/18/2021    HDL 47 06/16/2010    LDLCALC 69 10/18/2021    LABVLDL 60 10/18/2021     No results found for: CKTOTAL, CKMB, CKMBINDEX, TROPONINI  CRP:  No results found for: CRP    Cardiovascular Data:  EKG 6/30/21: Personally reviewed with my interpretation: Normal sinus rhythm with heart rate 68 bpm, no abnormalities. Echo 4/2/21:    Summary   LV systolic function is hyperdynamic with an estimated EF of >= 65%. The left ventricle is normal in size with mild concentric hypertrophy. Type I diastolic dysfunction with normal LV filling pressures. Mild mitral annular calcification noted. The maximal transaortic velocity is 2.83m/s which gives peak pressure   gradient= 32mmHg and mean pressure gradient= 18mmHG which is c/w mild aortic   stenosis. Mild aortic regurgitation. Systolic pulmonary artery pressure (SPAP) is estimated at 32 mmHg (right   atrial pressure 8 mmHg). CT lung screening 10/28/21:   FINDINGS: Mediastinum:  Thyroid gland appears normal.  Small mediastinal and hilar nodes are noted. Atherosclerotic change seen in aorta. Severe coronary artery calcification is seen. Aortic valve calcification is seen. Trace pericardial fluid is seen     Coronary calcium score:  No results found for this or any previous visit. Ankle-brachial index:  No results found for this or any previous visit. Carotid ultrasound screening:  No results found for this or any previous visit. Abdominal aortic aneurysm screening:   No results found for this or any previous visit.       The 10-year ASCVD risk score (Earnest Ponce, et al., 2013) is: 54.8%    Values used to calculate the score:      Age: 80 years      Sex: Female      Is Non- : No      Diabetic: Yes      Tobacco smoker: No      Systolic Blood Pressure: 775 mmHg      Is BP treated: Yes      HDL Cholesterol: 32 mg/dL      Total Cholesterol: 161 mg/dL     In adults at borderline risk (5% to <7.5% 10-year ASCVD risk) or intermediate risk (7.5% to <20% 10-  year ASCVD risk), the following risk-enhancing factors and/or testing has been reviewed:        Ted et al. Circulation 2019 High-Intensity Moderate-Intensity    Percent LDL-C Reduction ³50% ³30-49%   Primary statins Atorvastatin 40-80 mg Atorvastatin 10-20 mg    Rosuvastatin 20-40 mg Rosuvastatin 5-10 mg   Secondary statins   Pravastatin 40-80 mg     Simvastatin 20-40 mg     Impression and Plan:     Preventive cardiac evaluation   Hypertriglyceridemia  Essential hypertension  Osteoarthritis of both shoulders, unspecified osteoarthritis type  Dyslipidemia with high LDL and low HDL  Murmur  Mild aortic regurgitation  Obstructive sleep apnea  Type 2 diabetes mellitus without complication, without long-term current use of insulin (HCC)  Coronary artery disease by CT, without angina   Mild aortic stenosis   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 We encouraged modest weight loss through implementing appropriate dietary measures as well as initiation of a graded exercise program with the ultimate goal of 150 minutes of aerobic exercise weekly. 2.  STOP taking simvastatin, low-dose over-the-counter fish oil  3. START taking atorvastatin, fish oil 2 g twice daily  4. Check Lipid panel in 6 weeks  5. START taking a Aspirin 81mg daily  6. Monitor blood pressure at home, and keep blood pressure log for next visit. 7.  Consider repeat echocardiogram 3-5 years for surveillance of mixed valvular disease; educated on diagnosis    Follow up with me in 3 months; will call with lipid results 6 weeks    Scribe's attestation: This note was scribed in the presence of Dr. Jassi Vanegas M.D. By Callie Tay RN    The scribes documentation has been prepared under my direction and personally reviewed by me in its entirety. I confirm that the note above accurately reflects all work, treatment, procedures, and medical decision making performed by me.   Chata Jameson MD, personally performed the services described in this documentation as scribed by Callie Tay RN in my presence, and it is both accurate and complete to the best of our ability. I will address the patient's cardiac risk factors and adjusted pharmacologic treatment as needed. In addition, I have reinforced the need for patient directed risk factor modification. All questions and concerns were addressed to the patient/family. Alternatives to my treatment were discussed. Patient verbalized understanding. Thank you for allowing us to participate in the care of this patient.

## 2021-11-29 ENCOUNTER — OFFICE VISIT (OUTPATIENT)
Dept: CARDIOLOGY CLINIC | Age: 81
End: 2021-11-29
Payer: MEDICARE

## 2021-11-29 VITALS
WEIGHT: 175 LBS | BODY MASS INDEX: 34.36 KG/M2 | DIASTOLIC BLOOD PRESSURE: 74 MMHG | HEIGHT: 60 IN | OXYGEN SATURATION: 96 % | SYSTOLIC BLOOD PRESSURE: 140 MMHG | HEART RATE: 82 BPM

## 2021-11-29 DIAGNOSIS — I10 ESSENTIAL HYPERTENSION: Chronic | ICD-10-CM

## 2021-11-29 DIAGNOSIS — R01.1 MURMUR: ICD-10-CM

## 2021-11-29 DIAGNOSIS — G47.33 OBSTRUCTIVE SLEEP APNEA: ICD-10-CM

## 2021-11-29 DIAGNOSIS — E78.5 DYSLIPIDEMIA WITH HIGH LDL AND LOW HDL: ICD-10-CM

## 2021-11-29 DIAGNOSIS — M19.012 OSTEOARTHRITIS OF BOTH SHOULDERS, UNSPECIFIED OSTEOARTHRITIS TYPE: Chronic | ICD-10-CM

## 2021-11-29 DIAGNOSIS — M19.011 OSTEOARTHRITIS OF BOTH SHOULDERS, UNSPECIFIED OSTEOARTHRITIS TYPE: Chronic | ICD-10-CM

## 2021-11-29 DIAGNOSIS — E78.1 HYPERTRIGLYCERIDEMIA: Primary | Chronic | ICD-10-CM

## 2021-11-29 DIAGNOSIS — I35.1 MILD AORTIC REGURGITATION: ICD-10-CM

## 2021-11-29 DIAGNOSIS — E11.9 TYPE 2 DIABETES MELLITUS WITHOUT COMPLICATION, WITHOUT LONG-TERM CURRENT USE OF INSULIN (HCC): ICD-10-CM

## 2021-11-29 PROCEDURE — G8427 DOCREV CUR MEDS BY ELIG CLIN: HCPCS | Performed by: INTERNAL MEDICINE

## 2021-11-29 PROCEDURE — 4040F PNEUMOC VAC/ADMIN/RCVD: CPT | Performed by: INTERNAL MEDICINE

## 2021-11-29 PROCEDURE — 1123F ACP DISCUSS/DSCN MKR DOCD: CPT | Performed by: INTERNAL MEDICINE

## 2021-11-29 PROCEDURE — 1036F TOBACCO NON-USER: CPT | Performed by: INTERNAL MEDICINE

## 2021-11-29 PROCEDURE — 3051F HG A1C>EQUAL 7.0%<8.0%: CPT | Performed by: INTERNAL MEDICINE

## 2021-11-29 PROCEDURE — G8400 PT W/DXA NO RESULTS DOC: HCPCS | Performed by: INTERNAL MEDICINE

## 2021-11-29 PROCEDURE — G8484 FLU IMMUNIZE NO ADMIN: HCPCS | Performed by: INTERNAL MEDICINE

## 2021-11-29 PROCEDURE — G8417 CALC BMI ABV UP PARAM F/U: HCPCS | Performed by: INTERNAL MEDICINE

## 2021-11-29 PROCEDURE — 1090F PRES/ABSN URINE INCON ASSESS: CPT | Performed by: INTERNAL MEDICINE

## 2021-11-29 PROCEDURE — 99204 OFFICE O/P NEW MOD 45 MIN: CPT | Performed by: INTERNAL MEDICINE

## 2021-11-29 RX ORDER — HYDROCODONE BITARTRATE AND ACETAMINOPHEN 5; 325 MG/1; MG/1
1 TABLET ORAL PRN
COMMUNITY
End: 2021-12-13 | Stop reason: SDUPTHER

## 2021-11-29 RX ORDER — ATORVASTATIN CALCIUM 40 MG/1
40 TABLET, FILM COATED ORAL DAILY
Qty: 90 TABLET | Refills: 1 | Status: SHIPPED | OUTPATIENT
Start: 2021-11-29 | End: 2022-06-09

## 2021-11-29 RX ORDER — CHLORAL HYDRATE 500 MG
2000 CAPSULE ORAL 2 TIMES DAILY
Qty: 90 CAPSULE | Refills: 3 | Status: SHIPPED | OUTPATIENT
Start: 2021-11-29 | End: 2022-04-06

## 2021-11-29 NOTE — LETTER
[address]  1940    PREVENTIVE CARDIOLOGY CONSULTATION      Patient Name:  Paz Real  Requesting Physician: No admitting provider for patient encounter. Primary Care Physician: Toni Rowley MD    Reason for Consultation/Chief Complaint: Preventive cardiac evaluation, coronary artery calcification    History of Present Illness:    Paz Real is a 80 y.o. patient presenting today for preventive care care visit. The patient had a routine lung CT screening recently performed with incidental findings of aortic valve and severe coronary artery calcification. Today, she reports she is doing well. She is following with pulmonology for the lung nodules and has repeat CT scan planned February. She reports that she has quit smoking since 2017. Previously smoked roughly 60 years 1.5-2 packs/day. She admits to sedentary lifestyle today. She reports she has arthritis which limits her. Reports she used to walk with a friend who is since passed away and she has not gotten back into the habit. She has an elliptical in her home but does not use this. She has no chest pain/pressure/heaviness with the activity she is performing. She is able to perform 13 stairs in the home without limiting cardiac symptoms. Notes mildly dyspneic at the top with no need to stop. No dyspnea at rest. Denies palpitations, near-syncope or angel syncope. She notes some \"dizziness\" every morning which makes her feel unbalanced. No recent falls. Denies paroxysmal nocturnal dyspnea, orthopnea, bendopnea, increasing lower extremity edema or rapid weight gain. She reports that all of this started when she started having pain, in her upper central abdomen. She states she did have an endoscopy which did not show anything. History of Crohn's though she is unsure of this diagnosis.          The patient's risk factors for cardiac disease include the following:  Hypertension  Hyperlipidemia  Diabetes mellitus  Obesity  Sedentary lifestyle   Inflammatory disorder/Crohn's disease  History of rheumatic heart disease        Past Medical History:   has a past medical history of Arthritis, Crohn's disease ([address] Fracture, foot, Fracture, wrist, Hyperlipidemia, Hypertension, Macular degeneration, Murmur, and Rheumatic fever. Surgical History:   has a past surgical history that includes Tonsillectomy and adenoidectomy (child); Appendectomy; Hysterectomy (age 41); Cervical disc surgery (4/08); joint replacement (2/06); joint replacement (7/05); joint replacement (2/10); Spine surgery (1/10); Cataract removal with implant (8/10); Cataract removal with implant (9/10); Facial cosmetic surgery (11/19/2010); other surgical history (2/12); Abdomen surgery (1967); Shoulder arthroscopy (9/5/12); joint replacement (Right, 12/10/13); other surgical history (Right, 1/23/14 & 2/10/14); Colonoscopy (03/14/2017); shoulder surgery (Left, 07/2017); and Total shoulder arthroplasty (12/11/2017). Social History:   reports that she quit smoking about 4 years ago. Her smoking use included cigarettes. She started smoking about 54 years ago. She has a 50.00 pack-year smoking history. She has never used smokeless tobacco. She reports that she does not drink alcohol and does not use drugs. Family History:  family history includes Cancer in her mother and sister; Diabetes in her maternal grandmother; Hemochromatosis in her maternal grandfather. Current Medications:  Current Outpatient Medications on File Prior to Visit   Medication Sig Dispense Refill    HYDROcodone-acetaminophen (NORCO) 5-325 MG per tablet Take 1 tablet by mouth as needed for Pain.       ipratropium (ATROVENT) 0.03 % nasal spray 2 sprays by Each Nostril route every 12 hours 30 mL 3    simvastatin (ZOCOR) 40 MG tablet Take 1 tablet by mouth nightly 90 tablet 1    escitalopram (LEXAPRO) 20 MG tablet Take 1 tablet by mouth daily 90 tablet 1    budesonide-formoterol (SYMBICORT) 160-4.5 MCG/ACT AERO Inhale 2 puffs into the lungs 2 times daily 10.2 g 3    metFORMIN (GLUCOPHAGE) 1000 MG tablet TAKE ONE TABLET BY MOUTH TWICE A DAY WITH MEALS 60 tablet 1    losartan-hydroCHLOROthiazide (HYZAAR) 100-12.5 MG per tablet TAKE ONE TABLET BY MOUTH DAILY 90 tablet 0    pantoprazole (PROTONIX) 40 MG tablet       ondansetron (ZOFRAN-ODT) 4 MG disintegrating tablet       valACYclovir (VALTREX) 1 g tablet Take 1 tablet by mouth every 12 hours 20 tablet 1    Budeson-Glycopyrrol-Formoterol (BREZTRI AEROSPHERE) 160-9-4.8 MCG/ACT AERO Inhale 2 puffs into the lungs 2 times daily 1 Inhaler 0    omeprazole (PRILOSEC) 20 MG delayed release capsule Take 1 capsule by mouth daily 30 capsule 3    Collagen Hydrolysate POWD by Does not apply route      Omega-3 Fatty Acids (FISH OIL) 875 MG CAPS Take by mouth      dicyclomine (BENTYL) 10 MG capsule Take 1 capsule by mouth 4 times daily as needed (cramping) 90 capsule 0     No current facility-administered medications on file prior to visit. Allergies:  Diovan [valsartan], Bee venom, Formaldehyde, Iodine, Lisinopril, Niaspan [niacin er], and Amoxicillin     Review of Systems:   A 14 point review of symptoms completed. Pertinent positives identified in the HPI, all other review of symptoms negative as below. Objective:  Vitals:    11/29/21 1302 11/29/21 1306   BP: 139/74 (!) 140/74   Pulse: 82    SpO2: 96%    Weight: 175 lb (79.4 kg)    Height: 4' 11.5\" (1.511 m)       Weight: 175 lb (79.4 kg)    There were no vitals filed for this visit. PHYSICAL EXAM:      General:  Alert, cooperative, no distress, appears stated age   Head:  Normocephalic, atraumatic   Eyes:  Conjunctiva/corneas clear, anicteric sclerae    Nose: Nares normal, no drainage or sinus tenderness   Throat: No abnormalities of the lips, oral mucosa or tongue. Moist mucous membranes. Neck: Trachea midline.  Neck supple with no lymphadenopathy, thyroid not enlarged, symmetric, no tenderness/mass/nodules, no Jugular venous pressure elevation    Lungs:   Clear to auscultation bilaterally, no wheezes, no rales, no respiratory distress   Chest Wall:  No deformity or tenderness to palpation   Heart:  Regular rate and rhythm, normal S1, normal S2, 2/6 systolic murmur, right sternal border, no rub, no S3/S4, PMI non-displaced. Abdomen:    Obese abdomen, soft, non-tender, with normoactive bowel sounds. No masses, no hepatosplenomegaly   Extremities: No cyanosis, clubbing, trace pitting edema. Vascular: 2+ radial, dorsalis pedis and posterior tibial pulses bilaterally. Brisk carotid upstrokes without carotid bruit. Skin: Skin color, texture, turgor are normal with no rashes or ulceration. Psych: Euthymic mood, appropriate affect   Neurologic: Oriented to person, place and time. No slurred speech or facial asymmetry. No motor or sensory deficits on gross examination.         Labs:  CBC:    Lab Results   Component Value Date    WBC 9.1 06/30/2021    RBC 4.37 06/30/2021    HGB 12.9 06/30/2021    HCT 37.7 06/30/2021    MCV 86.3 06/30/2021    RDW 13.8 06/30/2021     06/30/2021     CMP:  Lab Results   Component Value Date     10/18/2021    K 4.0 10/18/2021    CL 96 10/18/2021    CO2 27 10/18/2021    BUN 15 10/18/2021    CREATININE 0.5 10/18/2021    GFRAA >60 10/18/2021    GFRAA >60 06/16/2010    AGRATIO 1.6 10/18/2021    LABGLOM >60 10/18/2021    LABGLOM 93 10/03/2014    GLUCOSE 140 10/18/2021    GLUCOSE 88 07/22/2011    PROT 7.7 10/18/2021    PROT 7.6 02/08/2013    CALCIUM 10.0 10/18/2021    BILITOT 0.3 10/18/2021    ALKPHOS 55 10/18/2021    AST 15 10/18/2021    ALT 20 10/18/2021     PT/INR:  No results found for: PTINR  HgBA1c:    Lab Results   Component Value Date    LABA1C 7.2 10/18/2021     Lipid:    Lab Results   Component Value Date    HDL 32 10/18/2021    HDL 47 06/16/2010    LDLCALC 69 10/18/2021    LABVLDL 60 10/18/2021     No results found for: CKTOTAL, CKMB, CKMBINDEX, TROPONINI  CRP:  No results found for: CRP    Cardiovascular Data:  EKG 6/30/21: Personally reviewed with my interpretation: Normal sinus rhythm with heart rate 68 bpm, no abnormalities. Echo 4/2/21:    Summary   LV systolic function is hyperdynamic with an estimated EF of >= 65%. The left ventricle is normal in size with mild concentric hypertrophy. Type I diastolic dysfunction with normal LV filling pressures. Mild mitral annular calcification noted. The maximal transaortic velocity is 2.83m/s which gives peak pressure   gradient= 32mmHg and mean pressure gradient= 18mmHG which is c/w mild aortic   stenosis. Mild aortic regurgitation. Systolic pulmonary artery pressure (SPAP) is estimated at 32 mmHg (right   atrial pressure 8 mmHg). CT lung screening 10/28/21:   FINDINGS: Mediastinum:  Thyroid gland appears normal.  Small mediastinal and hilar nodes are noted. Atherosclerotic change seen in aorta. Severe coronary artery calcification is seen. Aortic valve calcification is seen. Trace pericardial fluid is seen     Coronary calcium score:  No results found for this or any previous visit. Ankle-brachial index:  No results found for this or any previous visit. Carotid ultrasound screening:  No results found for this or any previous visit. Abdominal aortic aneurysm screening:   No results found for this or any previous visit.       The 10-year ASCVD risk score (Earnest Ponce, et al., 2013) is: 54.8%    Values used to calculate the score:      Age: 80 years      Sex: Female      Is Non- : No      Diabetic: Yes      Tobacco smoker: No      Systolic Blood Pressure: 768 mmHg      Is BP treated: Yes      HDL Cholesterol: 32 mg/dL      Total Cholesterol: 161 mg/dL     In adults at borderline risk (5% to <7.5% 10-year ASCVD risk) or intermediate risk (7.5% to <20% 10-  year ASCVD risk), the following risk-enhancing factors and/or testing has been reviewed:        Ted et al. Circulation 2019     High-Intensity Moderate-Intensity    Percent LDL-C Reduction ³50% ³30-49%   Primary statins Atorvastatin 40-80 mg Atorvastatin 10-20 mg    Rosuvastatin 20-40 mg Rosuvastatin 5-10 mg   Secondary statins   Pravastatin 40-80 mg     Simvastatin 20-40 mg     Impression and Plan:     Preventive cardiac evaluation   Hypertriglyceridemia  Essential hypertension  Osteoarthritis of both shoulders, unspecified osteoarthritis type  Dyslipidemia with high LDL and low HDL  Murmur  Mild aortic regurgitation  Obstructive sleep apnea  Type 2 diabetes mellitus without complication, without long-term current use of insulin (HCC)  Coronary artery disease by CT, without angina   Mild aortic stenosis   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 We encouraged modest weight loss through implementing appropriate dietary measures as well as initiation of a graded exercise program with the ultimate goal of 150 minutes of aerobic exercise weekly. 2.  STOP taking simvastatin, low-dose over-the-counter fish oil  3. START taking atorvastatin, fish oil 2 g twice daily  4. Check Lipid panel in 6 weeks  5. START taking a Aspirin 81mg daily  6. Monitor blood pressure at home, and keep blood pressure log for next visit. 7.  Consider repeat echocardiogram 3-5 years for surveillance of mixed valvular disease; educated on diagnosis    Follow up with me in 3 months; will call with lipid results 6 weeks    Scribe's attestation: This note was scribed in the presence of Dr. Gerhard Mcghee M.D. By Ayaka Cintron RN    The scribes documentation has been prepared under my direction and personally reviewed by me in its entirety. I confirm that the note above accurately reflects all work, treatment, procedures, and medical decision making performed by me.   Meseret Newton MD, personally performed the services described in this documentation as scribed by Kandyce Pallas RN in my presence, and it is both accurate and complete to the best of our ability. I will address the patient's cardiac risk factors and adjusted pharmacologic treatment as needed. In addition, I have reinforced the need for patient directed risk factor modification. All questions and concerns were addressed to the patient/family. Alternatives to my treatment were discussed. Patient verbalized understanding. Thank you for allowing us to participate in the care of this patient.

## 2021-12-13 ENCOUNTER — OFFICE VISIT (OUTPATIENT)
Dept: ORTHOPEDIC SURGERY | Age: 81
End: 2021-12-13
Payer: MEDICARE

## 2021-12-13 VITALS — WEIGHT: 175 LBS | BODY MASS INDEX: 34.36 KG/M2 | HEIGHT: 60 IN

## 2021-12-13 DIAGNOSIS — M51.36 DDD (DEGENERATIVE DISC DISEASE), LUMBAR: ICD-10-CM

## 2021-12-13 DIAGNOSIS — G89.4 CHRONIC PAIN SYNDROME: Primary | ICD-10-CM

## 2021-12-13 DIAGNOSIS — M54.16 LUMBAR RADICULITIS: ICD-10-CM

## 2021-12-13 PROCEDURE — G8427 DOCREV CUR MEDS BY ELIG CLIN: HCPCS | Performed by: PHYSICIAN ASSISTANT

## 2021-12-13 PROCEDURE — 1123F ACP DISCUSS/DSCN MKR DOCD: CPT | Performed by: PHYSICIAN ASSISTANT

## 2021-12-13 PROCEDURE — 1036F TOBACCO NON-USER: CPT | Performed by: PHYSICIAN ASSISTANT

## 2021-12-13 PROCEDURE — G8484 FLU IMMUNIZE NO ADMIN: HCPCS | Performed by: PHYSICIAN ASSISTANT

## 2021-12-13 PROCEDURE — 1090F PRES/ABSN URINE INCON ASSESS: CPT | Performed by: PHYSICIAN ASSISTANT

## 2021-12-13 PROCEDURE — 4040F PNEUMOC VAC/ADMIN/RCVD: CPT | Performed by: PHYSICIAN ASSISTANT

## 2021-12-13 PROCEDURE — 99213 OFFICE O/P EST LOW 20 MIN: CPT | Performed by: PHYSICIAN ASSISTANT

## 2021-12-13 PROCEDURE — G8417 CALC BMI ABV UP PARAM F/U: HCPCS | Performed by: PHYSICIAN ASSISTANT

## 2021-12-13 PROCEDURE — G8400 PT W/DXA NO RESULTS DOC: HCPCS | Performed by: PHYSICIAN ASSISTANT

## 2021-12-13 RX ORDER — HYDROCODONE BITARTRATE AND ACETAMINOPHEN 5; 325 MG/1; MG/1
1 TABLET ORAL PRN
Qty: 30 TABLET | Refills: 0 | Status: SHIPPED | OUTPATIENT
Start: 2022-01-31 | End: 2022-03-02

## 2021-12-13 RX ORDER — HYDROCODONE BITARTRATE AND ACETAMINOPHEN 5; 325 MG/1; MG/1
1 TABLET ORAL PRN
Qty: 30 TABLET | Refills: 0 | Status: SHIPPED | OUTPATIENT
Start: 2022-03-01 | End: 2022-03-14 | Stop reason: SDUPTHER

## 2021-12-13 RX ORDER — HYDROCODONE BITARTRATE AND ACETAMINOPHEN 5; 325 MG/1; MG/1
1 TABLET ORAL PRN
Qty: 30 TABLET | Refills: 0 | Status: SHIPPED | OUTPATIENT
Start: 2022-01-01 | End: 2022-01-31

## 2021-12-13 RX ORDER — ASPIRIN 81 MG/1
81 TABLET ORAL DAILY
COMMUNITY

## 2021-12-13 NOTE — PROGRESS NOTES
SPINE: Follow Up     CHIEF COMPLAINT:    Chief Complaint   Patient presents with    Follow-up     CHECK BACK       HISTORY OF PRESENT ILLNESS:                The patient is a 80 y.o. female well known to me, here for medication maintenance for chronic back pain. She still reports chronic achy stable LBP, R>L. Periodically she will experience radiating pain into the right lateral thigh/calf. Her low back pain is most bothersome. Symptoms are increased with prolonged activity bending lifting. She notes difficulties on Wednesdays when her garbage needs to go out. She still reports relief with rest and heat.  Pain is well managed with Norco 5/325 I po qd PRN without side effects. Medication does allow her to be more functional--able to perform ADLs independently, travel. She has plans to travel to [facility] next year. She is typically able to sleep >6hr/night. She is able to care for her 9 cats. She denies any progressive numbness tingling or weakness. Pain is overall chronic and stable at this time. She was recently diagnosed with a pulmonary nodule and is following with pulmonologist.  She has plans for repeat chest CT in February.     Current/Past Treatment:   · Physical Therapy: YES  · Chiropractic: no  · Injection: Multiple prior injections & procedures: ESIs, RFN: Dr. Patel Bhatti, R IA hip inj--Dr. Woods Precise  · Medications: Norco 5mg I po qd PRN, prior oral steroids, NSAIDs, post-op percocet (Dr. Howard Pena)    · Surgery/Consult: h/o lumbar decompression, Dr. Amber Bernal      Function-Does the pain medication improve your ability to do:   ? Personal care: Yes  ? Housework: Yes   ? Physical activity: Yes  ? Social activity: Yes     Pain Scale: 1-10  With Meds:  0-1/10  Pain Scale: 1-10 Without Meds: 8-10/10     Potential aberrant drug-related behavior:  ? Aberrant behavior identified? NO  ? Potential aberrant behavior identified? NO  ? Reports loss are stolen prescriptions? NO  ? Insist on certain medications by name? ulcerations or lesions. · Reflexes: Reflexes are symmetrically trace to 1+ at the patellar and ankle tendons. Clonus absent bilaterally at the feet. · Gait & station: Slightly forward flexed unassisted     · Additional Examinations:   · RIGHT LOWER EXTREMITY: Inspection/examination of the right lower extremity does not show any tenderness, deformity or injury. Range of motion is full. There is no gross instability. There are no rashes, ulcerations or lesions. Strength and tone are normal.  · LEFT LOWER EXTREMITY:  Inspection/examination of the left lower extremity does not show any tenderness, deformity or injury. Range of motion is full. There is no gross instability. There are no rashes, ulcerations or lesions. Strength and tone are normal.    Diagnostic Testing:   UDS 9/13/2021  +MOP as expected (new cups)    Lumbar MRI scan and report independently reviewed from June 2021 showing L3-4 disc bulging with mild central stenosis, disc bulging with left foraminal stenosis L4-5, multilevel facet arthropathy/synovitis. There is no high-grade central or foraminal stenosis. Findings at L3-4 have worsened since prior MRI scan. UDS 4/26/2021 +MOP as expected (new cups)    UDS 3-2-2020 negative for all, taking PRN low dosing      UDS April 2, 2019 +MOP as expected      UDS February 28, 2018 negative for all (last dose 3 days prior)     ORT 5-2018=1     UDS 4-28-17 + for OPI as expected      UDS 4-8-16: Negative for all as expected, last dose was last week (taking PRN)      Lumbar radiographs 3-27-15 2 views AP/LAT: L4-5 spondy. Moderate DDD L4-5 & L5-S1      MRI 11/28/2011:  IMPRESSION-         1. Stable L4 and L5 left pedicle edema of uncertain etiology.         2.  L4-L5 facet arthropathy, particularly on the left where there    is a left L4 and L5 nerve root compression.              Impression:    1) Chronic LBP, intermittent right lumbar radiculitis--stable  2) H/o lumbar decompression, Dr. Tamra Gamble   3) H/o multiple interventional procedures, Dr. Nirmala Cole  4) Right hip pain, OA, Dr. Alexandre Mcgovern  5) Opioid maintenance, low risk per ORT =1  6) S/p R TSR, Dr. Ethan Edge:  1) Refill:   Orders Placed This Encounter   Medications    HYDROcodone-acetaminophen (NORCO) 5-325 MG per tablet     Sig: Take 1 tablet by mouth as needed for Pain for up to 30 days. Dispense:  30 tablet     Refill:  0     Reduce doses taken as pain becomes manageable    HYDROcodone-acetaminophen (NORCO) 5-325 MG per tablet     Sig: Take 1 tablet by mouth as needed for Pain for up to 30 days. Dispense:  30 tablet     Refill:  0     Reduce doses taken as pain becomes manageable    HYDROcodone-acetaminophen (NORCO) 5-325 MG per tablet     Sig: Take 1 tablet by mouth as needed for Pain for up to 30 days. Dispense:  30 tablet     Refill:  0     Reduce doses taken as pain becomes manageable     2) Cont HEP  3) F/u 3mo, sooner if needed     Controlled Substance Monitoring:    Acute and Chronic Pain Monitoring:   RX Monitoring 12/13/2021   Attestation -   Periodic Controlled Substance Monitoring Possible medication side effects, risk of tolerance/dependence & alternative treatments discussed. ;No signs of potential drug abuse or diversion identified. ;Assessed functional status. ;Obtaining appropriate analgesic effect of treatment. Chronic Pain > 50 MEDD Re-evaluated the status of the patient's underlying condition causing pain.;Obtained or confirmed \"Consent for Opioid Use\" on file. Chronic Pain > 80 MEDD Naloxone script offered, but declined by patient. The risks and use of maintenance opiate medication were reviewed. These include the risk of tolerance, addition, and abuse. Potential side effects were also discussed. Informed verbal consent was obtained. Medications are to be taken as prescribed and not escalated without prior agreement. OARRS/NITA reviewed & appropriate.    Opiate medications will only be prescribed through the office of Laurie Kruger PA-C, [facility] unless notified otherwise         Goals of the current treatment regimen include: improvement in pain, improvement in overall in physical performance, ability to perform daily activities, work or disability, emotional distress, health care utilization and decreased medication consumption. Progress will be monitored towards achieving/maintaining therapeutic goals:    1. Improving perceived interference of pain with ADL's, ability to perform HEP, continue to improve in overall flexibility, ROM, strength and endurance, ability to do household activities indoor and/or outdoor, work and social/leisure activities. Improve psychosocial and physical functioning. 2. Improving sleep to 6-7 hours a night. Improve mood/ anxiety and depression symptoms    3. Reduction of reliance on opioid analgesia/more appropriate opioid use. Utilization of adjuvant medications as appropriate. Risks and benefits of the medications and alternative treatments have been discussed with the patient. The patient was advised against drinking alcohol with the opioid pain medicines, advised against driving or handling machinery when starting or adjusting the dose of medicines, feeling groggy or drowsy, or if having any cognitive issues related to the current medications. The patient is fully aware of the risk of potential addiction, overdose and death, if medicines are misused and not taken as prescribed. Planned duration of treatment until patient is able to be functional with less pain. Patient will follow up every 3 months or sooner, if needed. Discussed patient's responsibility to safely store and appropriately dispose up medication. Prescription for Narcan offered. If the patient develops new symptoms or if the symptoms worsen, the patient was told to call the office.       Fantasma Jj PA-C, [facility]  Board Certified by the [facility]

## 2021-12-26 DIAGNOSIS — I10 ESSENTIAL HYPERTENSION: Chronic | ICD-10-CM

## 2021-12-27 RX ORDER — LOSARTAN POTASSIUM AND HYDROCHLOROTHIAZIDE 12.5; 1 MG/1; MG/1
TABLET ORAL
Qty: 90 TABLET | Refills: 1 | Status: SHIPPED | OUTPATIENT
Start: 2021-12-27 | End: 2022-07-18 | Stop reason: SDUPTHER

## 2021-12-27 NOTE — TELEPHONE ENCOUNTER
Darren Vargas is requesting refill(s) losartan/hctz  Last OV 10/18/21 (pertaining to medication)  LR 9/24/21 (per medication requested)  Next office visit scheduled or attempted Yes   If no, reason:  1/18/22    Darren Vargas is requesting refill(s) metformin  Last OV 10/18/21 (pertaining to medication)  LR 10/5/21 (per medication requested)  Next office visit scheduled or attempted Yes   If no, reason:  1/18/22

## 2022-01-06 ENCOUNTER — TELEPHONE (OUTPATIENT)
Dept: ORTHOPEDIC SURGERY | Age: 82
End: 2022-01-06

## 2022-01-06 NOTE — TELEPHONE ENCOUNTER
Other PHARMACY CALLED TO GET CLARIFICATION ON THE DIRECTIONS FOR THE MEDICATION. NEED TO KNOW THE FREQUENCY.  PATIENT USUALLY TAKES ON TABLET BY MOUTHCONTACT PHARMACY TO CONFIRM

## 2022-01-07 NOTE — TELEPHONE ENCOUNTER
Called & spoke with the pharmacy about the patient's pharmacy about the clarification needed on the prescription. Pharmacy states the medication was dispensed as written to take 1 tablet daily as needed for pain for up to 30 days. I informed the pharmacy this was correct.

## 2022-01-18 ENCOUNTER — OFFICE VISIT (OUTPATIENT)
Dept: FAMILY MEDICINE CLINIC | Age: 82
End: 2022-01-18
Payer: MEDICARE

## 2022-01-18 VITALS
HEIGHT: 60 IN | WEIGHT: 173.6 LBS | SYSTOLIC BLOOD PRESSURE: 130 MMHG | DIASTOLIC BLOOD PRESSURE: 70 MMHG | HEART RATE: 79 BPM | BODY MASS INDEX: 34.08 KG/M2 | OXYGEN SATURATION: 94 %

## 2022-01-18 DIAGNOSIS — J44.9 CHRONIC OBSTRUCTIVE PULMONARY DISEASE, UNSPECIFIED COPD TYPE (HCC): ICD-10-CM

## 2022-01-18 DIAGNOSIS — I35.0 AORTIC VALVE STENOSIS, ETIOLOGY OF CARDIAC VALVE DISEASE UNSPECIFIED: ICD-10-CM

## 2022-01-18 DIAGNOSIS — I10 ESSENTIAL HYPERTENSION: ICD-10-CM

## 2022-01-18 DIAGNOSIS — I25.10 CORONARY ARTERY DISEASE DUE TO CALCIFIED CORONARY LESION: ICD-10-CM

## 2022-01-18 DIAGNOSIS — F32.89 OTHER DEPRESSION: ICD-10-CM

## 2022-01-18 DIAGNOSIS — M51.36 DDD (DEGENERATIVE DISC DISEASE), LUMBAR: ICD-10-CM

## 2022-01-18 DIAGNOSIS — I25.84 CORONARY ARTERY DISEASE DUE TO CALCIFIED CORONARY LESION: ICD-10-CM

## 2022-01-18 DIAGNOSIS — E11.69 TYPE 2 DIABETES MELLITUS WITH OTHER SPECIFIED COMPLICATION, WITHOUT LONG-TERM CURRENT USE OF INSULIN (HCC): Primary | ICD-10-CM

## 2022-01-18 DIAGNOSIS — E78.5 DYSLIPIDEMIA WITH HIGH LDL AND LOW HDL: ICD-10-CM

## 2022-01-18 PROBLEM — E11.9 TYPE 2 DIABETES MELLITUS WITHOUT COMPLICATION, WITHOUT LONG-TERM CURRENT USE OF INSULIN (HCC): Status: RESOLVED | Noted: 2021-06-30 | Resolved: 2022-01-18

## 2022-01-18 LAB
A/G RATIO: 1.6 (ref 1.1–2.2)
ALBUMIN SERPL-MCNC: 4.6 G/DL (ref 3.4–5)
ALP BLD-CCNC: 53 U/L (ref 40–129)
ALT SERPL-CCNC: 21 U/L (ref 10–40)
ANION GAP SERPL CALCULATED.3IONS-SCNC: 15 MMOL/L (ref 3–16)
AST SERPL-CCNC: 15 U/L (ref 15–37)
BILIRUB SERPL-MCNC: <0.2 MG/DL (ref 0–1)
BUN BLDV-MCNC: 16 MG/DL (ref 7–20)
CALCIUM SERPL-MCNC: 9.7 MG/DL (ref 8.3–10.6)
CHLORIDE BLD-SCNC: 100 MMOL/L (ref 99–110)
CHOLESTEROL, TOTAL: 129 MG/DL (ref 0–199)
CO2: 23 MMOL/L (ref 21–32)
CREAT SERPL-MCNC: <0.5 MG/DL (ref 0.6–1.2)
GFR AFRICAN AMERICAN: >60
GFR NON-AFRICAN AMERICAN: >60
GLUCOSE BLD-MCNC: 130 MG/DL (ref 70–99)
HBA1C MFR BLD: 7.4 %
HDLC SERPL-MCNC: 34 MG/DL (ref 40–60)
LDL CHOLESTEROL CALCULATED: 63 MG/DL
POTASSIUM SERPL-SCNC: 4.3 MMOL/L (ref 3.5–5.1)
SODIUM BLD-SCNC: 138 MMOL/L (ref 136–145)
TOTAL PROTEIN: 7.5 G/DL (ref 6.4–8.2)
TRIGL SERPL-MCNC: 161 MG/DL (ref 0–150)
VLDLC SERPL CALC-MCNC: 32 MG/DL

## 2022-01-18 PROCEDURE — 36415 COLL VENOUS BLD VENIPUNCTURE: CPT | Performed by: FAMILY MEDICINE

## 2022-01-18 PROCEDURE — 1090F PRES/ABSN URINE INCON ASSESS: CPT | Performed by: FAMILY MEDICINE

## 2022-01-18 PROCEDURE — G8400 PT W/DXA NO RESULTS DOC: HCPCS | Performed by: FAMILY MEDICINE

## 2022-01-18 PROCEDURE — 83036 HEMOGLOBIN GLYCOSYLATED A1C: CPT | Performed by: FAMILY MEDICINE

## 2022-01-18 PROCEDURE — G8484 FLU IMMUNIZE NO ADMIN: HCPCS | Performed by: FAMILY MEDICINE

## 2022-01-18 PROCEDURE — G8417 CALC BMI ABV UP PARAM F/U: HCPCS | Performed by: FAMILY MEDICINE

## 2022-01-18 PROCEDURE — G8427 DOCREV CUR MEDS BY ELIG CLIN: HCPCS | Performed by: FAMILY MEDICINE

## 2022-01-18 PROCEDURE — 99215 OFFICE O/P EST HI 40 MIN: CPT | Performed by: FAMILY MEDICINE

## 2022-01-18 PROCEDURE — 1036F TOBACCO NON-USER: CPT | Performed by: FAMILY MEDICINE

## 2022-01-18 PROCEDURE — 1123F ACP DISCUSS/DSCN MKR DOCD: CPT | Performed by: FAMILY MEDICINE

## 2022-01-18 PROCEDURE — 3023F SPIROM DOC REV: CPT | Performed by: FAMILY MEDICINE

## 2022-01-18 PROCEDURE — 4040F PNEUMOC VAC/ADMIN/RCVD: CPT | Performed by: FAMILY MEDICINE

## 2022-01-18 ASSESSMENT — PATIENT HEALTH QUESTIONNAIRE - PHQ9
10. IF YOU CHECKED OFF ANY PROBLEMS, HOW DIFFICULT HAVE THESE PROBLEMS MADE IT FOR YOU TO DO YOUR WORK, TAKE CARE OF THINGS AT HOME, OR GET ALONG WITH OTHER PEOPLE: 0
5. POOR APPETITE OR OVEREATING: 0
8. MOVING OR SPEAKING SO SLOWLY THAT OTHER PEOPLE COULD HAVE NOTICED. OR THE OPPOSITE, BEING SO FIGETY OR RESTLESS THAT YOU HAVE BEEN MOVING AROUND A LOT MORE THAN USUAL: 0
9. THOUGHTS THAT YOU WOULD BE BETTER OFF DEAD, OR OF HURTING YOURSELF: 0
SUM OF ALL RESPONSES TO PHQ QUESTIONS 1-9: 9
6. FEELING BAD ABOUT YOURSELF - OR THAT YOU ARE A FAILURE OR HAVE LET YOURSELF OR YOUR FAMILY DOWN: 3
SUM OF ALL RESPONSES TO PHQ9 QUESTIONS 1 & 2: 0
1. LITTLE INTEREST OR PLEASURE IN DOING THINGS: 0
SUM OF ALL RESPONSES TO PHQ QUESTIONS 1-9: 9
2. FEELING DOWN, DEPRESSED OR HOPELESS: 0
SUM OF ALL RESPONSES TO PHQ QUESTIONS 1-9: 9
4. FEELING TIRED OR HAVING LITTLE ENERGY: 3
SUM OF ALL RESPONSES TO PHQ QUESTIONS 1-9: 9
3. TROUBLE FALLING OR STAYING ASLEEP: 3
7. TROUBLE CONCENTRATING ON THINGS, SUCH AS READING THE NEWSPAPER OR WATCHING TELEVISION: 0

## 2022-01-18 NOTE — PROGRESS NOTES
Car Vigil presents for   Chief Complaint   Patient presents with    Diabetes     Pt is here for 3 month follow up and FBW    Hypertension    Hyperlipidemia    Coronary Artery Disease        ASSESSMENT:   Diagnosis Orders   1. Type 2 diabetes mellitus with other specified complication, without long-term current use of insulin ([facility] [address] A1c up today at 7.4. We will continue metformin at 1000 mg twice a day, we will add Farxiga 5 mg. She will continue to monitor her blood sugars dapagliflozin (FARXIGA) 5 MG tablet   2. Essential hypertension, stable on losartan HCTZ Comprehensive Metabolic Panel   3. Coronary artery disease due to calcified coronary lesion, continue aspirin and statin. Patient has follow-up appointment scheduled for March keep that appointment    4. Chronic obstructive pulmonary disease, unspecified COPD type ([facility] [address] stable continue current regimen keep appointment in February with pulmonology    5. Aortic valve stenosis, etiology of cardiac valve disease unspecified, stable continue follow-up with cardiology    6. Dyslipidemia with high LDL and low HDL, patient now on Lipitor. We will check her lipids for her today Lipid Panel   7. DDD (degenerative disc disease), lumbar, continue follow-up with orthopedics    8. Depression, stable continue Lexapro    Plan:  1)  Medication: Farxiga 5 mg was added to her regimen. We talked about potential side effects. ,  Other medications are working and tolerated, continue as listed  2)  Recheck in 3 months, sooner should new symptoms or problems arise. 3) LLR    Patient currently has no contacts for emergency listed. After further questioning she does have a sister that lives in [place]. After she understood that the person did not need to necessarily live in [place].   She was asked this stop and get her chart updated with those emergency contact    I spent a total of 40* minutes with the patient, reviewing previous notes, consults, test results, imaging ,discussing chronic and acute conditions        SUBJECTIVE:  Aleksandar Gillette is a 80 y.o. female who presents for evaluation of diabetes, COPD, CAD, aortic valve stenosis, hypertension, depression, hypertension and hyperlipidemia. She denies any symptoms referable to her elevated blood pressure. Specifically denies chest pain, palpitations, , orthopnea, PND or peripheral edema or neuro sx. No anorexia,  or leg cramps noted. Current medication regimen is as listed below. She denies any side effects of medication, and has been taking it regularly. Lab Results   Component Value Date    LDLCALC 69 10/18/2021    LDLDIRECT 123 (H) 01/03/2018       Patient was seen in October 2021. After that appointment, she had a low-dose lung cancer screening CAT scan. Nodules were identified, she was seen by pulmonology for her pulmonary nodules and COPD. She has a follow-up appointment and a follow-up CAT scan ordered for next month. When reviewing her medicines I was asking the patient what inhaler she is using. She is unable to tell me. She did not bring her medications with her. She also had severe coronary artery stenosis and aortic calcifications noted on the CAT scan. She was seen by cardiology. They changed her statin to Lipitor and started on fish oil and a baby aspirin. She has a follow-up appointment with them in March. Patient has been monitoring her blood pressure at home. She did not bring her cuff with her. Her numbers that were reviewed were consistently above 140, however my blood pressure reading here in the office today was 130/68. She was asked to bring her blood pressure cuff with her to her next appointment to check for accuracy. Patient's depression is stable on Lexapro. Patient's last A1c was 7.2 taking metformin 1000 mg twice a day. Today her A1c was 7.4. As a result, we will add Farxiga 5 mg to her regimen. We talked about potential side effects of UTIs or yeast infections.   If she tolerates this we may increase the dose to 10 mg to get the cardiac and renal protection effects. Patient continues follow-up with orthopedics for management of her chronic pain    Current Outpatient Medications   Medication Sig Dispense Refill    dapagliflozin (FARXIGA) 5 MG tablet Take 1 tablet by mouth every morning 30 tablet 5    metFORMIN (GLUCOPHAGE) 1000 MG tablet TAKE ONE TABLET BY MOUTH TWICE A DAY WITH MEALS 180 tablet 1    losartan-hydroCHLOROthiazide (HYZAAR) 100-12.5 MG per tablet TAKE ONE TABLET BY MOUTH DAILY 90 tablet 1    aspirin 81 MG EC tablet Take 81 mg by mouth daily      [START ON 3/1/2022] HYDROcodone-acetaminophen (NORCO) 5-325 MG per tablet Take 1 tablet by mouth as needed for Pain for up to 30 days.  30 tablet 0    atorvastatin (LIPITOR) 40 MG tablet Take 1 tablet by mouth daily 90 tablet 1    Omega-3 Fatty Acids (FISH OIL) 1000 MG CAPS Take 2 capsules by mouth 2 times daily 90 capsule 3    ipratropium (ATROVENT) 0.03 % nasal spray 2 sprays by Each Nostril route every 12 hours 30 mL 3    escitalopram (LEXAPRO) 20 MG tablet Take 1 tablet by mouth daily 90 tablet 1    budesonide-formoterol (SYMBICORT) 160-4.5 MCG/ACT AERO Inhale 2 puffs into the lungs 2 times daily 10.2 g 3    pantoprazole (PROTONIX) 40 MG tablet       ondansetron (ZOFRAN-ODT) 4 MG disintegrating tablet       valACYclovir (VALTREX) 1 g tablet Take 1 tablet by mouth every 12 hours 20 tablet 1    Budeson-Glycopyrrol-Formoterol (BREZTRI AEROSPHERE) 160-9-4.8 MCG/ACT AERO Inhale 2 puffs into the lungs 2 times daily 1 Inhaler 0    omeprazole (PRILOSEC) 20 MG delayed release capsule Take 1 capsule by mouth daily 30 capsule 3    Collagen Hydrolysate POWD by Does not apply route      dicyclomine (BENTYL) 10 MG capsule Take 1 capsule by mouth 4 times daily as needed (cramping) 90 capsule 0    [START ON 1/31/2022] HYDROcodone-acetaminophen (NORCO) 5-325 MG per tablet Take 1 tablet by mouth as needed for Pain for up to 30 days. (Patient not taking: Reported on 2022) 30 tablet 0    HYDROcodone-acetaminophen (NORCO) 5-325 MG per tablet Take 1 tablet by mouth as needed for Pain for up to 30 days. (Patient not taking: Reported on 2022) 30 tablet 0     No current facility-administered medications for this visit. Allergies   Allergen Reactions    Diovan [Valsartan] Anaphylaxis     Throat swelling      Bee Venom     Formaldehyde Itching    Iodine Hives     IVP dye    Lisinopril      Throat swelling  Angioedema      Niaspan [Niacin Er]      facial rash and itching    Amoxicillin Rash, Hives and Itching       Social History     Tobacco Use    Smoking status: Former Smoker     Packs/day: 1.00     Years: 50.00     Pack years: 50.00     Types: Cigarettes     Start date:      Quit date: 2017     Years since quittin.7    Smokeless tobacco: Never Used    Tobacco comment: 16- using chantix again as starting to sneak a few cigarettes. quit again with Chantix in . smoking again 19. \"quit\" 17, but still occ cigarette   Substance Use Topics    Alcohol use: No     Alcohol/week: 0.0 standard drinks       OBJECTIVE:   /70   Pulse 79   Ht 4' 11.5\" (1.511 m)   Wt 173 lb 9.6 oz (78.7 kg)   SpO2 94%   BMI 34.48 kg/m²   NAD  Neck no bruit or JVD  S1 and S2 normal, aortic murmur ,  noclicks, gallops or rubs. Regular rate and rhythm. Chest is clear; no wheezes or rales. No edema or JVD. Neuro alert, no CN or motor deficits  Psych nl mood, thought and judgement                EMR Dragon/transcription disclaimer:  Much of this encounter note is electronic transcription/translation of spoken language to printed texts. The electronic translation of spoken language may be erroneous, or at times, nonsensical words or phrases may be inadvertently transcribed.   Although I have reviewed the note for such errors, some may still exist.

## 2022-01-19 ENCOUNTER — TELEPHONE (OUTPATIENT)
Dept: PULMONOLOGY | Age: 82
End: 2022-01-19

## 2022-01-19 NOTE — TELEPHONE ENCOUNTER
Office cancelled appointment on 2/10/22 with Dr. Erin Price for 3 mo fu. We LM on 1/19/22 and informed pt we would need to cancel appt and requested pt to call office back to reschedule appt.

## 2022-01-24 ENCOUNTER — TELEPHONE (OUTPATIENT)
Dept: ORTHOPEDIC SURGERY | Age: 82
End: 2022-01-24

## 2022-01-24 NOTE — TELEPHONE ENCOUNTER
The patient is wanting to let Noemy Hawkins know she can't get her prescriptions filled. She states they won't fill them. Please call.

## 2022-01-24 NOTE — TELEPHONE ENCOUNTER
Called & spoke with the patient informing her that I would discuss the medication issue with Hoa Martínez PA-C. Patient was informed [place] [facility] pharmacy tomorrow to get clarification on the prescription and what it needs to say for the patient. Patient will be called back tomorrow for an update. Patient may call back at [phone] with any questions or concerns. Patient voiced understanding.

## 2022-02-09 ENCOUNTER — HOSPITAL ENCOUNTER (OUTPATIENT)
Dept: CT IMAGING | Age: 82
Discharge: HOME | End: 2022-02-09
Payer: MEDICARE

## 2022-02-09 DIAGNOSIS — Z87.891 HISTORY OF TOBACCO ABUSE: ICD-10-CM

## 2022-02-09 DIAGNOSIS — R91.1 LUNG NODULE: ICD-10-CM

## 2022-02-09 PROCEDURE — G1010 CDSM STANSON: HCPCS

## 2022-02-15 ENCOUNTER — TELEPHONE (OUTPATIENT)
Dept: PULMONOLOGY | Age: 82
End: 2022-02-15

## 2022-02-15 DIAGNOSIS — R91.1 LUNG NODULE: Primary | ICD-10-CM

## 2022-02-15 NOTE — TELEPHONE ENCOUNTER
Impression   The 7 mm nodule in the left lower lobe has not resolved since October 20 21. Given that it is a new finding since more remote studies, this could   represent an early finding of lung carcinoma.       Moderate coronary artery calcification             Talked with the patient over the phone regarding the lung nodule, it is the exact same size as it was back in October 7 mm unfortunately she misunderstood it as being another new nodule.   We will go ahead and repeat CT scan in 3 months  Orders been placed    Went over the fact that there was a hiatal hernia but we would not do anything about it  There is a small cyst that was seen on the kidney  We will see how the cyst turns out in the next CT scan in 3 months if it is growing then I would defer to primary care physician to send to nephrology or urology

## 2022-02-23 ENCOUNTER — TELEPHONE (OUTPATIENT)
Dept: FAMILY MEDICINE CLINIC | Age: 82
End: 2022-02-23

## 2022-02-23 ENCOUNTER — TELEMEDICINE (OUTPATIENT)
Dept: FAMILY MEDICINE CLINIC | Age: 82
End: 2022-02-23
Payer: MEDICARE

## 2022-02-23 DIAGNOSIS — Z00.00 MEDICARE ANNUAL WELLNESS VISIT, SUBSEQUENT: Primary | ICD-10-CM

## 2022-02-23 PROCEDURE — 4040F PNEUMOC VAC/ADMIN/RCVD: CPT | Performed by: FAMILY MEDICINE

## 2022-02-23 PROCEDURE — 1123F ACP DISCUSS/DSCN MKR DOCD: CPT | Performed by: FAMILY MEDICINE

## 2022-02-23 PROCEDURE — G0439 PPPS, SUBSEQ VISIT: HCPCS | Performed by: FAMILY MEDICINE

## 2022-02-23 SDOH — ECONOMIC STABILITY: FOOD INSECURITY: WITHIN THE PAST 12 MONTHS, YOU WORRIED THAT YOUR FOOD WOULD RUN OUT BEFORE YOU GOT MONEY TO BUY MORE.: NEVER TRUE

## 2022-02-23 SDOH — ECONOMIC STABILITY: FOOD INSECURITY: WITHIN THE PAST 12 MONTHS, THE FOOD YOU BOUGHT JUST DIDN'T LAST AND YOU DIDN'T HAVE MONEY TO GET MORE.: NEVER TRUE

## 2022-02-23 ASSESSMENT — PATIENT HEALTH QUESTIONNAIRE - PHQ9
SUM OF ALL RESPONSES TO PHQ QUESTIONS 1-9: 15
7. TROUBLE CONCENTRATING ON THINGS, SUCH AS READING THE NEWSPAPER OR WATCHING TELEVISION: 0
SUM OF ALL RESPONSES TO PHQ QUESTIONS 1-9: 15
8. MOVING OR SPEAKING SO SLOWLY THAT OTHER PEOPLE COULD HAVE NOTICED. OR THE OPPOSITE, BEING SO FIGETY OR RESTLESS THAT YOU HAVE BEEN MOVING AROUND A LOT MORE THAN USUAL: 0
10. IF YOU CHECKED OFF ANY PROBLEMS, HOW DIFFICULT HAVE THESE PROBLEMS MADE IT FOR YOU TO DO YOUR WORK, TAKE CARE OF THINGS AT HOME, OR GET ALONG WITH OTHER PEOPLE: 1
2. FEELING DOWN, DEPRESSED OR HOPELESS: 3
1. LITTLE INTEREST OR PLEASURE IN DOING THINGS: 3
SUM OF ALL RESPONSES TO PHQ9 QUESTIONS 1 & 2: 6
3. TROUBLE FALLING OR STAYING ASLEEP: 3
5. POOR APPETITE OR OVEREATING: 3
9. THOUGHTS THAT YOU WOULD BE BETTER OFF DEAD, OR OF HURTING YOURSELF: 0
4. FEELING TIRED OR HAVING LITTLE ENERGY: 3

## 2022-02-23 ASSESSMENT — LIFESTYLE VARIABLES: HOW OFTEN DO YOU HAVE A DRINK CONTAINING ALCOHOL: NEVER

## 2022-02-23 ASSESSMENT — SOCIAL DETERMINANTS OF HEALTH (SDOH): HOW HARD IS IT FOR YOU TO PAY FOR THE VERY BASICS LIKE FOOD, HOUSING, MEDICAL CARE, AND HEATING?: NOT HARD AT ALL

## 2022-02-23 NOTE — TELEPHONE ENCOUNTER
Pt states valtrex  No longer helping with her cold sores- last time  She had an outbreak had to end up taking 8 pills  Over the course of 4 days   Had 3 fever blisters in a row back to back  just last month  Denies any changes in medications or health

## 2022-02-23 NOTE — PROGRESS NOTES
Medicare Annual Wellness Visit    Wilton Keller is here for Medicare [id number] [address] Kwadwo was seen today for medicare awv. Diagnoses and all orders for this visit:    Medicare annual wellness visit, subsequent         Recommendations for Preventive Services Due: see orders and patient instructions/AVS.  Recommended screening schedule for the next 5-10 years is provided to the patient in written form: see Patient Instructions/AVS.     No follow-ups on file. Reviewed and updated this visit:  Tobacco  Allergies  Meds  Med Hx  Surg Hx  Soc Hx  Fam Hx      Subjective       Patient's complete Health Risk Assessment and screening values have been reviewed and are found in Flowsheets. The following problems were reviewed today and where indicated follow up appointments were made and/or referrals ordered. Positive Risk Factor Screenings with Interventions:    Fall Risk:  Do you feel unsteady or are you worried about falling? : (!) yes  2 or more falls in past year?: no  Fall with injury in past year?: no     Fall Risk Interventions:    · Home safety tips provided     Depression:  PHQ-2 Score: 6  PHQ-9 Total Score: 15    Severity:1-4 = minimal depression, 5-9 = mild depression, 10-14 = moderate depression, 15-19 = moderately severe depression, 20-27 = severe depression    Depression Interventions:  · Patient declines any further evaluation/treatment for this issue         Opioid Risk: (Low risk score ? 55)  Opioid risk score: 7    Any opioid medication usage will be addressed by their licensed provider at a future visit.     General Health and ACP:  General  In general, how would you say your health is?: Good  In the past 7 days, have you experienced any of the following: New or Increased Pain, New or Increased Fatigue, Loneliness, Social Isolation, Stress or Anger?: (!) Yes  Select all that apply: (!) Loneliness  Do you get the social and emotional support that you need?: (!) No  Do you have a Living Will?: Yes    Advance Directives     Power of [address] ACP-Advance Directive ACP-Power of     Not on File Not on File Filed Not on File      General Health Risk Interventions:  · No Living Will: ACP documents already completed- patient asked to provide copy to the office    Health Habits/Nutrition:     Physical Activity: Inactive    Days of Exercise per Week: 0 days    Minutes of Exercise per Session: 0 min     Have you lost any weight without trying in the past 3 months?: No     Have you seen the dentist within the past year?: N/A - wear dentures    Health Habits/Nutrition Interventions:  · Inadequate physical activity:  patient is not ready to increase his/her physical activity level at this time     Safety:  Do you have working smoke detectors?: Yes  Do you have any tripping hazards - loose or unsecured carpets or rugs?: No  Do you have any tripping hazards - clutter in doorways, halls, or stairs?: (!) Yes (clutter on stairs)  Do you have either shower bars, grab bars, non-slip mats or non-slip surfaces in your shower or bathtub?: Yes  Do all of your stairways have a railing or banister?: Yes  Do you always fasten your seatbelt when you are in a car?: Yes    Safety Interventions:  · Home safety tips provided        Objective      Patient-Reported Vitals  Patient-Reported Systolic (Top): 069 mmHg  Patient-Reported Diastolic (Bottom): 61 mmHg  BP Observations: Yes, BP was taken on electronic monitoring device with digital readout  Patient-Reported Pulse: 73  Patient-Reported Weight: 173lb  Patient-Reported Height: 4' 11.5              Allergies   Allergen Reactions    Diovan [Valsartan] Anaphylaxis     Throat swelling      Bee Venom     Formaldehyde Itching    Iodine Hives     IVP dye    Lisinopril      Throat swelling  Angioedema      Niaspan [Niacin Er]      facial rash and itching    Amoxicillin Rash, Hives and Itching     Prior to Visit Medications    Medication Sig Taking? Authorizing Provider   dapagliflozin (FARXIGA) 5 MG tablet Take 1 tablet by mouth every morning  Tim Phillips MD   metFORMIN (GLUCOPHAGE) 1000 MG tablet TAKE ONE TABLET BY MOUTH TWICE A DAY WITH MEALS  Tim Phillips MD   losartan-hydroCHLOROthiazide (HYZAAR) 100-12.5 MG per tablet TAKE ONE TABLET BY Dodie Andrea MD   aspirin 81 MG EC tablet Take 81 mg by mouth daily  Historical Provider, MD   HYDROcodone-acetaminophen (NORCO) 5-325 MG per tablet Take 1 tablet by mouth as needed for Pain for up to 30 days. Jolie Young PA-C   HYDROcodone-acetaminophen [facility]) 5-325 MG per tablet Take 1 tablet by mouth as needed for Pain for up to 30 days.   Patient not taking: Reported on 1/18/2022  Shazia Dsouza PA-C   atorvastatin (LIPITOR) 40 MG tablet Take 1 tablet by mouth daily  Karyle Ivory, MD   Omega-3 Fatty Acids (FISH OIL) 1000 MG CAPS Take 2 capsules by mouth 2 times daily  Karyle Ivory, MD   ipratropium (ATROVENT) 0.03 % nasal spray 2 sprays by Each Nostril route every 12 hours  Jarett Mathews MD   escitalopram (LEXAPRO) 20 MG tablet Take 1 tablet by mouth daily  Tim Phillips MD   budesonide-formoterol (SYMBICORT) 160-4.5 MCG/ACT AERO Inhale 2 puffs into the lungs 2 times daily  Tim Phillips MD   pantoprazole (PROTONIX) 40 MG tablet   Historical Provider, MD   ondansetron (ZOFRAN-ODT) 4 MG disintegrating tablet   Historical Provider, MD   valACYclovir (VALTREX) 1 g tablet Take 1 tablet by mouth every 12 hours  Tim Phillips MD   Budeson-Glycopyrrol-Formoterol (BREZTRI AEROSPHERE) 160-9-4.8 MCG/ACT AERO Inhale 2 puffs into the lungs 2 times daily  Tim Phillips MD   omeprazole (PRILOSEC) 20 MG delayed release capsule Take 1 capsule by mouth daily  Tim Phillips MD   Collagen Hydrolysate POWD by Does not apply route  Historical Provider, MD   dicyclomine (BENTYL) 10 MG capsule Take 1 capsule by mouth 4 times daily as needed (cramping)  Tim Phillips MD       CareTeam (Including outside providers/suppliers regularly involved in providing care):   Patient Care Team:  Natasha Hagen MD as PCP - General (Family Medicine)  Natasha Hagen MD as PCP - [facility] Empaneled Provider  Elmo Hendrix RN as Registered Nurse       Kelvin Mccann, was evaluated through a synchronous (real-time) audio-video encounter. The patient (or guardian if applicable) is aware that this is a billable service, which includes applicable co-pays. This Virtual Visit was conducted with patient's (and/or legal guardian's) consent. The visit was conducted pursuant to the emergency declaration under the [address] [place] [address], [address] [place] [address] authority and the [employer] and [employer] General Act. Patient identification was verified, and a caregiver was present when appropriate. The patient was located at home in a state where the provider was licensed to provide care. Lang Griffith LPN, [id number]/[birth year]66, performed the documented evaluation under the direct supervision of the attending physician. This encounter was performed under my, Mihai Zuñiga, direct supervision, 2/23/2022.

## 2022-02-23 NOTE — PATIENT INSTRUCTIONS
Personalized Preventive Plan for [facility] - 2/23/2022  Medicare offers a range of preventive health benefits. Some of the tests and screenings are paid in full while other may be subject to a deductible, co-insurance, and/or copay. Some of these benefits include a comprehensive review of your medical history including lifestyle, illnesses that may run in your family, and various assessments and screenings as appropriate. After reviewing your medical record and screening and assessments performed today your provider may have ordered immunizations, labs, imaging, and/or referrals for you. A list of these orders (if applicable) as well as your Preventive Care list are included within your After Visit Summary for your review. Other Preventive Recommendations:    · A preventive eye exam performed by an eye specialist is recommended every 1-2 years to screen for glaucoma; cataracts, macular degeneration, and other eye disorders. · A preventive dental visit is recommended every 6 months. · Try to get at least 150 minutes of exercise per week or 10,000 steps per day on a pedometer . · Order or download the FREE \"Exercise & Physical Activity: Your Everyday Guide\" from [employer]. Call 9-346.896.1411 or search [employer] online. · You need 2377-9325 mg of calcium and 4819-3836 IU of vitamin D per day. It is possible to meet your calcium requirement with diet alone, but a vitamin D supplement is usually necessary to meet this goal.  · When exposed to the sun, use a sunscreen that protects against both UVA and UVB radiation with an SPF of 30 or greater. Reapply every 2 to 3 hours or after sweating, drying off with a towel, or swimming. · Always wear a seat belt when traveling in a car. Always wear a helmet when riding a bicycle or motorcycle.   Patient Education        Preventing Falls: Care Instructions  Your Care Instructions     Getting around your home safely can be a challenge if you have injuries or health problems that make it easy for you to fall. Loose rugs and furniture in walkways are among the dangers for many older people who have problems walking or who have poor eyesight. People who have conditions such as arthritis, osteoporosis, or dementia also have to be careful not to fall. You can make your home safer with a few simple measures. Follow-up care is a key part of your treatment and safety. Be sure to make and go to all appointments, and call your doctor if you are having problems. It's also a good idea to know your test results and keep a list of the medicines you take. How can you care for yourself at home? Taking care of yourself  You may get dizzy if you do not drink enough water. To prevent dehydration, drink plenty of fluids. Choose water and other clear liquids. If you have kidney, heart, or liver disease and have to limit fluids, talk with your doctor before you increase the amount of fluids you drink. Exercise regularly to improve your strength, muscle tone, and balance. Walk if you can. Swimming may be a good choice if you cannot walk easily. Have your vision and hearing checked each year or any time you notice a change. If you have trouble seeing and hearing, you might not be able to avoid objects and could lose your balance. Know the side effects of the medicines you take. Ask your doctor or pharmacist whether the medicines you take can affect your balance. Sleeping pills or sedatives can affect your balance. Limit the amount of alcohol you drink. Alcohol can impair your balance and other senses. Ask your doctor whether calluses or corns on your feet need to be removed. If you wear loose-fitting shoes because of calluses or corns, you can lose your balance and fall. Talk to your doctor if you have numbness in your feet. Preventing falls at home  Remove raised doorway thresholds, throw rugs, and clutter.  Repair loose carpet or raised areas in the floor. Move furniture and electrical cords to keep them out of walking paths. Use nonskid floor wax, and wipe up spills right away, especially on ceramic tile floors. If you use a walker or cane, put rubber tips on it. If you use crutches, clean the bottoms of them regularly with an abrasive pad, such as steel wool. Keep your house well lit, especially stairways, porches, and outside walkways. Use night-lights in areas such as hallways and bathrooms. Add extra light switches or use remote switches (such as switches that go on or off when you clap your hands) to make it easier to turn lights on if you have to get up during the night. Install sturdy handrails on stairways. Move items in your cabinets so that the things you use a lot are on the lower shelves (about waist level). Keep a cordless phone and a flashlight with new batteries by your bed. If possible, put a phone in each of the main rooms of your house, or carry a cell phone in case you fall and cannot reach a phone. Or, you can wear a device around your neck or wrist. You push a button that sends a signal for help. Wear low-heeled shoes that fit well and give your feet good support. Use footwear with nonskid soles. Check the heels and soles of your shoes for wear. Repair or replace worn heels or soles. Do not wear socks without shoes on wood floors. Walk on the grass when the sidewalks are slippery. If you live in an area that gets snow and ice in the winter, sprinkle salt on slippery steps and sidewalks. Preventing falls in the bath  Install grab bars and nonskid mats inside and outside your shower or tub and near the toilet and sinks. Use shower chairs and bath benches. Use a hand-held shower head that will allow you to sit while showering.   Get into a tub or shower by putting the weaker leg in first. Get out of a tub or shower with your strong side first.  Repair loose toilet seats and consider installing a raised toilet seat to make getting on and off the toilet easier. Keep your bathroom door unlocked while you are in the shower. Where can you learn more? Go to [email or web address] and sign in to your [employer] account. Enter [phone] in the [facility] box to learn more about \"Preventing Falls: Care Instructions. \"     If you do not have an account, please click on the \"Sign Up Now\" link. Current as of: September 8, 2021               Content Version: 13.1  © 7565-3173 [employer]. Care instructions adapted under license by [facility] ([facility]). If you have questions about a medical condition or this instruction, always ask your healthcare professional. [address] any warranty or liability for your use of this information.

## 2022-03-03 NOTE — PROGRESS NOTES
PREVENTIVE CARDIOLOGY FOLLOW-UP      Patient Name:  Arlene Figueroa  Requesting Physician: No admitting provider for patient encounter. Primary Care Physician: Linus Favre, MD    Reason for Consultation/Chief Complaint: Preventive cardiac evaluation, coronary artery calcification by CT    History of Present Illness:    Arlene Figueroa is a 80 y.o. patient presenting today for preventive care care visit. The patient had a routine lung CT screening recently performed with incidental findings of aortic valve and severe coronary artery calcification. LOV 11/29/21,   She is following with pulmonology for the lung nodules and has repeat CT scan planned February. She reported that she had quit smoking since 2017. Previously smoked roughly 60 years 1.5-2 packs/day. She reports arthritis which limits her. She was able to perform 13 stairs in the home without limiting cardiac symptoms. Notes mildly dyspneic at the top with no need to stop. Today 3/3/22 she presents to the office with continued shortness of breath. Progressive. Dyspneic on walking in from parking lot today. No chest pain/pressure/heaviness with exertion. No palpitations or irregular heartbeat. Denies syncope. She states that she has been feeling off balance when she wakes up in the mornings and has had some \"minor falls\" at home as well due to this. Rises slowly from bed. No recent medication changes. Denies paroxysmal nocturnal dyspnea, orthopnea, bendopnea, increasing lower extremity edema or weight gain.         The patient's risk factors for cardiac disease include the following:  Hypertension  Hyperlipidemia  Diabetes mellitus  Obesity  Sedentary lifestyle   Inflammatory disorder/Crohn's disease  History of rheumatic heart disease        Past Medical History:   has a past medical history of Arthritis, Crohn's disease ([address] Fracture, foot, Fracture, wrist, Hyperlipidemia, Hypertension, Macular degeneration, Murmur, and Rheumatic fever.    Surgical History:   has a past surgical history that includes Tonsillectomy and adenoidectomy (child); Appendectomy; Hysterectomy (age 41); Cervical disc surgery (4/08); joint replacement (2/06); joint replacement (7/05); joint replacement (2/10); Spine surgery (1/10); Cataract removal with implant (8/10); Cataract removal with implant (9/10); Facial cosmetic surgery (11/19/2010); other surgical history (2/12); Abdomen surgery (1967); Shoulder arthroscopy (9/5/12); joint replacement (Right, 12/10/13); other surgical history (Right, 1/23/14 & 2/10/14); Colonoscopy (03/14/2017); shoulder surgery (Left, 07/2017); and Total shoulder arthroplasty (12/11/2017). Social History:   reports that she quit smoking about 4 years ago. Her smoking use included cigarettes. She started smoking about 55 years ago. She has a 50.00 pack-year smoking history. She has never used smokeless tobacco. She reports that she does not drink alcohol and does not use drugs. Family History:  family history includes Cancer in her mother and sister; Diabetes in her maternal grandmother; Hemochromatosis in her maternal grandfather. Current Medications:  Current Outpatient Medications on File Prior to Visit   Medication Sig Dispense Refill    dapagliflozin (FARXIGA) 5 MG tablet Take 1 tablet by mouth every morning 30 tablet 5    metFORMIN (GLUCOPHAGE) 1000 MG tablet TAKE ONE TABLET BY MOUTH TWICE A DAY WITH MEALS 180 tablet 1    losartan-hydroCHLOROthiazide (HYZAAR) 100-12.5 MG per tablet TAKE ONE TABLET BY MOUTH DAILY 90 tablet 1    aspirin 81 MG EC tablet Take 81 mg by mouth daily      HYDROcodone-acetaminophen (NORCO) 5-325 MG per tablet Take 1 tablet by mouth as needed for Pain for up to 30 days.  30 tablet 0    atorvastatin (LIPITOR) 40 MG tablet Take 1 tablet by mouth daily 90 tablet 1    Omega-3 Fatty Acids (FISH OIL) 1000 MG CAPS Take 2 capsules by mouth 2 times daily 90 capsule 3    ipratropium (ATROVENT) 0.03 % nasal spray 2 sprays by Each Nostril route every 12 hours 30 mL 3    escitalopram (LEXAPRO) 20 MG tablet Take 1 tablet by mouth daily 90 tablet 1    budesonide-formoterol (SYMBICORT) 160-4.5 MCG/ACT AERO Inhale 2 puffs into the lungs 2 times daily 10.2 g 3    pantoprazole (PROTONIX) 40 MG tablet       ondansetron (ZOFRAN-ODT) 4 MG disintegrating tablet       valACYclovir (VALTREX) 1 g tablet Take 1 tablet by mouth every 12 hours 20 tablet 1    Budeson-Glycopyrrol-Formoterol (BREZTRI AEROSPHERE) 160-9-4.8 MCG/ACT AERO Inhale 2 puffs into the lungs 2 times daily 1 Inhaler 0    omeprazole (PRILOSEC) 20 MG delayed release capsule Take 1 capsule by mouth daily 30 capsule 3    Collagen Hydrolysate POWD by Does not apply route      dicyclomine (BENTYL) 10 MG capsule Take 1 capsule by mouth 4 times daily as needed (cramping) 90 capsule 0     No current facility-administered medications on file prior to visit. Allergies:  Diovan [valsartan], Bee venom, Formaldehyde, Iodine, Lisinopril, Niaspan [niacin er], and Amoxicillin     Review of Systems:   A 14 point review of symptoms completed. Pertinent positives identified in the HPI, all other review of symptoms negative as below. Objective:  Vitals:    03/04/22 0755   BP: 120/66   Pulse: 82   SpO2: 96%   Weight: 172 lb (78 kg)   Height: 4' 11\" (1.499 m)      Weight: 172 lb (78 kg)    There were no vitals filed for this visit. PHYSICAL EXAM:      General:  Alert, cooperative, no distress, appears stated age   Head:  Normocephalic, atraumatic   Eyes:  Conjunctiva/corneas clear, anicteric sclerae    Nose: Nares normal, no drainage or sinus tenderness   Throat: No abnormalities of the lips, oral mucosa or tongue. Moist mucous membranes. Neck: Trachea midline.  Neck supple with no lymphadenopathy, thyroid not enlarged, symmetric, no tenderness/mass/nodules, no Jugular venous pressure elevation    Lungs:   Clear to auscultation bilaterally, no wheezes, no rales, no respiratory distress   Chest Wall:  No deformity or tenderness to palpation   Heart:  Regular rate and rhythm, normal S1, normal S2, 2/6 systolic murmur radiating to the neck, right sternal border, no rub, no S3/S4, PMI non-displaced. Abdomen:    Obese abdomen, soft, non-tender, with normoactive bowel sounds. No masses, no hepatosplenomegaly   Extremities: No cyanosis, clubbing, trace pitting edema. Vascular: 2+ radial, dorsalis pedis and posterior tibial pulses bilaterally. Brisk carotid upstrokes without carotid bruit. Skin: Skin color, texture, turgor are normal with no rashes or ulceration. Psych: Euthymic mood, appropriate affect   Neurologic: Oriented to person, place and time. No slurred speech or facial asymmetry. No motor or sensory deficits on gross examination.         Labs:  CBC:    Lab Results   Component Value Date    WBC 9.1 06/30/2021    RBC 4.37 06/30/2021    HGB 12.9 06/30/2021    HCT 37.7 06/30/2021    MCV 86.3 06/30/2021    RDW 13.8 06/30/2021     06/30/2021     CMP:  Lab Results   Component Value Date     01/18/2022    K 4.3 01/18/2022     01/18/2022    CO2 23 01/18/2022    BUN 16 01/18/2022    CREATININE <0.5 01/18/2022    GFRAA >60 01/18/2022    GFRAA >60 06/16/2010    AGRATIO 1.6 01/18/2022    LABGLOM >60 01/18/2022    LABGLOM 93 10/03/2014    GLUCOSE 130 01/18/2022    GLUCOSE 88 07/22/2011    PROT 7.5 01/18/2022    PROT 7.6 02/08/2013    CALCIUM 9.7 01/18/2022    BILITOT <0.2 01/18/2022    ALKPHOS 53 01/18/2022    AST 15 01/18/2022    ALT 21 01/18/2022     PT/INR:  No results found for: PTINR  HgBA1c:    Lab Results   Component Value Date    LABA1C 7.4 01/18/2022     Lipid:    Lab Results   Component Value Date    HDL 34 01/18/2022    HDL 47 06/16/2010    LDLCALC 63 01/18/2022    LABVLDL 32 01/18/2022     No results found for: CKTOTAL, CKMB, CKMBINDEX, TROPONINI  CRP:  No results found for: CRP    Cardiovascular Data:  EKG 6/30/21: Personally reviewed with my interpretation: Normal sinus rhythm with heart rate 68 bpm, no abnormalities. Echo 4/2/21:    Summary   LV systolic function is hyperdynamic with an estimated EF of >= 65%. The left ventricle is normal in size with mild concentric hypertrophy. Type I diastolic dysfunction with normal LV filling pressures. Mild mitral annular calcification noted. The maximal transaortic velocity is 2.83m/s which gives peak pressure   gradient= 32mmHg and mean pressure gradient= 18mmHG which is c/w mild aortic   stenosis. Mild aortic regurgitation. Systolic pulmonary artery pressure (SPAP) is estimated at 32 mmHg (right   atrial pressure 8 mmHg). Additional studies:    CT lung screening 10/28/21:   FINDINGS: Mediastinum:  Thyroid gland appears normal.  Small mediastinal and hilar nodes are noted. Atherosclerotic change seen in aorta. Severe coronary artery calcification is seen. Aortic valve calcification is seen. Trace pericardial fluid is seen     Coronary calcium score:  No results found for this or any previous visit. Ankle-brachial index:  No results found for this or any previous visit. Carotid ultrasound screening:  No results found for this or any previous visit. Abdominal aortic aneurysm screening:   No results found for this or any previous visit. The ASCVD Risk score (Jewels Tan., et al., 2013) failed to calculate for the following reasons:     The valid total cholesterol range is 130 to 320 mg/dL     In adults at borderline risk (5% to <7.5% 10-year ASCVD risk) or intermediate risk (7.5% to <20% 10-  year ASCVD risk), the following risk-enhancing factors and/or testing has been reviewed:        Ted et al. Circulation 2019     High-Intensity Moderate-Intensity    Percent LDL-C Reduction ³50% ³30-49%   Primary statins Atorvastatin 40-80 mg Atorvastatin 10-20 mg    Rosuvastatin 20-40 mg Rosuvastatin 5-10 mg   Secondary statins   Pravastatin 40-80 mg     Simvastatin 20-40 mg     Impression and Plan:     Preventive cardiac evaluation   Dyspnea with exertion, progressive  Coronary artery disease by CT, without angina   Mild aortic stenosis/regurgitation    Mitral annular calcification without mitral stenosis   Obesity   Former smoker, now quit  Emphysema    PLAN:   1. Proceed with GXT Myoview for further evaluation of progressive dyspnea   2. Lipids are improved. Continue Lipitor and fish oil   3. Continue baby aspirin daily   4. Continue dapagliflozin for prevention in this diabetic patient, high risk for events      Follow up with me in 1 year tentatively pending stress results. Scribe's attestation: This note was scribed in the presence of Dr. Major Garcia M.D. By Aziza Cornejo RN    The scribes documentation has been prepared under my direction and personally reviewed by me in its entirety. I confirm that the note above accurately reflects all work, treatment, procedures, and medical decision making performed by me. Leena Shipley MD, personally performed the services described in this documentation as scribed by Aziza Cornejo RN in my presence, and it is both accurate and complete to the best of our ability. I will address the patient's cardiac risk factors and adjusted pharmacologic treatment as needed. In addition, I have reinforced the need for patient directed risk factor modification. All questions and concerns were addressed to the patient/family. Alternatives to my treatment were discussed. Patient verbalized understanding. Thank you for allowing us to participate in the care of this patient.     Kelly Pacheco MD, [address] [facility]  [phone] [address] [facility]  [phone] [facility]

## 2022-03-04 ENCOUNTER — OFFICE VISIT (OUTPATIENT)
Dept: CARDIOLOGY CLINIC | Age: 82
End: 2022-03-04
Payer: MEDICARE

## 2022-03-04 VITALS
HEIGHT: 59 IN | WEIGHT: 172 LBS | HEART RATE: 82 BPM | SYSTOLIC BLOOD PRESSURE: 120 MMHG | BODY MASS INDEX: 34.68 KG/M2 | DIASTOLIC BLOOD PRESSURE: 66 MMHG | OXYGEN SATURATION: 96 %

## 2022-03-04 DIAGNOSIS — R06.02 SHORTNESS OF BREATH: Primary | ICD-10-CM

## 2022-03-04 PROCEDURE — 99214 OFFICE O/P EST MOD 30 MIN: CPT | Performed by: INTERNAL MEDICINE

## 2022-03-04 PROCEDURE — G8484 FLU IMMUNIZE NO ADMIN: HCPCS | Performed by: INTERNAL MEDICINE

## 2022-03-04 PROCEDURE — G8417 CALC BMI ABV UP PARAM F/U: HCPCS | Performed by: INTERNAL MEDICINE

## 2022-03-04 PROCEDURE — 1036F TOBACCO NON-USER: CPT | Performed by: INTERNAL MEDICINE

## 2022-03-04 PROCEDURE — 1123F ACP DISCUSS/DSCN MKR DOCD: CPT | Performed by: INTERNAL MEDICINE

## 2022-03-04 PROCEDURE — 1090F PRES/ABSN URINE INCON ASSESS: CPT | Performed by: INTERNAL MEDICINE

## 2022-03-04 PROCEDURE — G8400 PT W/DXA NO RESULTS DOC: HCPCS | Performed by: INTERNAL MEDICINE

## 2022-03-04 PROCEDURE — 4040F PNEUMOC VAC/ADMIN/RCVD: CPT | Performed by: INTERNAL MEDICINE

## 2022-03-04 PROCEDURE — G8427 DOCREV CUR MEDS BY ELIG CLIN: HCPCS | Performed by: INTERNAL MEDICINE

## 2022-03-04 NOTE — PATIENT INSTRUCTIONS
PLAN:  1. We encouraged modest weight loss through implementing appropriate dietary measures as well as initiation of a graded exercise program with the ultimate goal of 150 minutes of aerobic exercise weekly. 2. Stress test to further evaluate increased shortness of breath  Your provider has ordered testing for further evaluation. An order has been included in your paper work.  To schedule outpatient testing, contact Central Scheduling by calling [employer] ([phone]).       Follow up with me in 1 year

## 2022-03-04 NOTE — LETTER
[address]  1940  PREVENTIVE CARDIOLOGY FOLLOW-UP      Patient Name:  Dinoλωdayοchaz Real  Requesting Physician: No admitting provider for patient encounter. Primary Care Physician: Tori Esqueda MD    Reason for Consultation/Chief Complaint: Preventive cardiac evaluation, coronary artery calcification by CT    History of Present Illness:    Paz Real is a 80 y.o. patient presenting today for preventive care care visit. The patient had a routine lung CT screening recently performed with incidental findings of aortic valve and severe coronary artery calcification. LOV 11/29/21,   She is following with pulmonology for the lung nodules and has repeat CT scan planned February. She reported that she had quit smoking since 2017. Previously smoked roughly 60 years 1.5-2 packs/day. She reports arthritis which limits her. She was able to perform 13 stairs in the home without limiting cardiac symptoms. Notes mildly dyspneic at the top with no need to stop. Today 3/3/22 she presents to the office with continued shortness of breath. Progressive. Dyspneic on walking in from parking lot today. No chest pain/pressure/heaviness with exertion. No palpitations or irregular heartbeat. Denies syncope. She states that she has been feeling off balance when she wakes up in the mornings and has had some \"minor falls\" at home as well due to this. Rises slowly from bed. No recent medication changes. Denies paroxysmal nocturnal dyspnea, orthopnea, bendopnea, increasing lower extremity edema or weight gain.         The patient's risk factors for cardiac disease include the following:  Hypertension  Hyperlipidemia  Diabetes mellitus  Obesity  Sedentary lifestyle   Inflammatory disorder/Crohn's disease  History of rheumatic heart disease        Past Medical History:   has a past medical history of Arthritis, Crohn's disease ([address] Fracture, foot, Fracture, wrist, Hyperlipidemia, Hypertension, Macular degeneration, Murmur, and Rheumatic fever. Surgical History:   has a past surgical history that includes Tonsillectomy and adenoidectomy (child); Appendectomy; Hysterectomy (age 41); Cervical disc surgery (4/08); joint replacement (2/06); joint replacement (7/05); joint replacement (2/10); Spine surgery (1/10); Cataract removal with implant (8/10); Cataract removal with implant (9/10); Facial cosmetic surgery (11/19/2010); other surgical history (2/12); Abdomen surgery (1967); Shoulder arthroscopy (9/5/12); joint replacement (Right, 12/10/13); other surgical history (Right, 1/23/14 & 2/10/14); Colonoscopy (03/14/2017); shoulder surgery (Left, 07/2017); and Total shoulder arthroplasty (12/11/2017). Social History:   reports that she quit smoking about 4 years ago. Her smoking use included cigarettes. She started smoking about 55 years ago. She has a 50.00 pack-year smoking history. She has never used smokeless tobacco. She reports that she does not drink alcohol and does not use drugs. Family History:  family history includes Cancer in her mother and sister; Diabetes in her maternal grandmother; Hemochromatosis in her maternal grandfather. Current Medications:  Current Outpatient Medications on File Prior to Visit   Medication Sig Dispense Refill    dapagliflozin (FARXIGA) 5 MG tablet Take 1 tablet by mouth every morning 30 tablet 5    metFORMIN (GLUCOPHAGE) 1000 MG tablet TAKE ONE TABLET BY MOUTH TWICE A DAY WITH MEALS 180 tablet 1    losartan-hydroCHLOROthiazide (HYZAAR) 100-12.5 MG per tablet TAKE ONE TABLET BY MOUTH DAILY 90 tablet 1    aspirin 81 MG EC tablet Take 81 mg by mouth daily      HYDROcodone-acetaminophen (NORCO) 5-325 MG per tablet Take 1 tablet by mouth as needed for Pain for up to 30 days.  30 tablet 0    atorvastatin (LIPITOR) 40 MG tablet Take 1 tablet by mouth daily 90 tablet 1    Omega-3 Fatty Acids (FISH OIL) 1000 MG CAPS Take 2 capsules by mouth 2 times daily 90 capsule 3    ipratropium (ATROVENT) 0.03 % nasal spray 2 sprays by Each Nostril route every 12 hours 30 mL 3    escitalopram (LEXAPRO) 20 MG tablet Take 1 tablet by mouth daily 90 tablet 1    budesonide-formoterol (SYMBICORT) 160-4.5 MCG/ACT AERO Inhale 2 puffs into the lungs 2 times daily 10.2 g 3    pantoprazole (PROTONIX) 40 MG tablet       ondansetron (ZOFRAN-ODT) 4 MG disintegrating tablet       valACYclovir (VALTREX) 1 g tablet Take 1 tablet by mouth every 12 hours 20 tablet 1    Budeson-Glycopyrrol-Formoterol (BREZTRI AEROSPHERE) 160-9-4.8 MCG/ACT AERO Inhale 2 puffs into the lungs 2 times daily 1 Inhaler 0    omeprazole (PRILOSEC) 20 MG delayed release capsule Take 1 capsule by mouth daily 30 capsule 3    Collagen Hydrolysate POWD by Does not apply route      dicyclomine (BENTYL) 10 MG capsule Take 1 capsule by mouth 4 times daily as needed (cramping) 90 capsule 0     No current facility-administered medications on file prior to visit. Allergies:  Diovan [valsartan], Bee venom, Formaldehyde, Iodine, Lisinopril, Niaspan [niacin er], and Amoxicillin     Review of Systems:   A 14 point review of symptoms completed. Pertinent positives identified in the HPI, all other review of symptoms negative as below. Objective:  Vitals:    03/04/22 0755   BP: 120/66   Pulse: 82   SpO2: 96%   Weight: 172 lb (78 kg)   Height: 4' 11\" (1.499 m)      Weight: 172 lb (78 kg)    There were no vitals filed for this visit. PHYSICAL EXAM:      General:  Alert, cooperative, no distress, appears stated age   Head:  Normocephalic, atraumatic   Eyes:  Conjunctiva/corneas clear, anicteric sclerae    Nose: Nares normal, no drainage or sinus tenderness   Throat: No abnormalities of the lips, oral mucosa or tongue. Moist mucous membranes. Neck: Trachea midline.  Neck supple with no lymphadenopathy, thyroid not enlarged, symmetric, no tenderness/mass/nodules, no Jugular venous pressure elevation    Lungs:   Clear to auscultation bilaterally, no wheezes, no rales, no respiratory distress   Chest Wall:  No deformity or tenderness to palpation   Heart:  Regular rate and rhythm, normal S1, normal S2, 2/6 systolic murmur radiating to the neck, right sternal border, no rub, no S3/S4, PMI non-displaced. Abdomen:    Obese abdomen, soft, non-tender, with normoactive bowel sounds. No masses, no hepatosplenomegaly   Extremities: No cyanosis, clubbing, trace pitting edema. Vascular: 2+ radial, dorsalis pedis and posterior tibial pulses bilaterally. Brisk carotid upstrokes without carotid bruit. Skin: Skin color, texture, turgor are normal with no rashes or ulceration. Psych: Euthymic mood, appropriate affect   Neurologic: Oriented to person, place and time. No slurred speech or facial asymmetry. No motor or sensory deficits on gross examination.         Labs:  CBC:    Lab Results   Component Value Date    WBC 9.1 06/30/2021    RBC 4.37 06/30/2021    HGB 12.9 06/30/2021    HCT 37.7 06/30/2021    MCV 86.3 06/30/2021    RDW 13.8 06/30/2021     06/30/2021     CMP:  Lab Results   Component Value Date     01/18/2022    K 4.3 01/18/2022     01/18/2022    CO2 23 01/18/2022    BUN 16 01/18/2022    CREATININE <0.5 01/18/2022    GFRAA >60 01/18/2022    GFRAA >60 06/16/2010    AGRATIO 1.6 01/18/2022    LABGLOM >60 01/18/2022    LABGLOM 93 10/03/2014    GLUCOSE 130 01/18/2022    GLUCOSE 88 07/22/2011    PROT 7.5 01/18/2022    PROT 7.6 02/08/2013    CALCIUM 9.7 01/18/2022    BILITOT <0.2 01/18/2022    ALKPHOS 53 01/18/2022    AST 15 01/18/2022    ALT 21 01/18/2022     PT/INR:  No results found for: PTINR  HgBA1c:    Lab Results   Component Value Date    LABA1C 7.4 01/18/2022     Lipid:    Lab Results   Component Value Date    HDL 34 01/18/2022    HDL 47 06/16/2010    LDLCALC 63 01/18/2022    LABVLDL 32 01/18/2022     No results found for: CKTOTAL, CKMB, CKMBINDEX, TROPONINI  CRP:  No results found for: CRP    Cardiovascular Data:  EKG 6/30/21: Personally reviewed with my interpretation: Normal sinus rhythm with heart rate 68 bpm, no abnormalities. Echo 4/2/21:    Summary   LV systolic function is hyperdynamic with an estimated EF of >= 65%. The left ventricle is normal in size with mild concentric hypertrophy. Type I diastolic dysfunction with normal LV filling pressures. Mild mitral annular calcification noted. The maximal transaortic velocity is 2.83m/s which gives peak pressure   gradient= 32mmHg and mean pressure gradient= 18mmHG which is c/w mild aortic   stenosis. Mild aortic regurgitation. Systolic pulmonary artery pressure (SPAP) is estimated at 32 mmHg (right   atrial pressure 8 mmHg). Additional studies:    CT lung screening 10/28/21:   FINDINGS: Mediastinum:  Thyroid gland appears normal.  Small mediastinal and hilar nodes are noted. Atherosclerotic change seen in aorta. Severe coronary artery calcification is seen. Aortic valve calcification is seen. Trace pericardial fluid is seen     Coronary calcium score:  No results found for this or any previous visit. Ankle-brachial index:  No results found for this or any previous visit. Carotid ultrasound screening:  No results found for this or any previous visit. Abdominal aortic aneurysm screening:   No results found for this or any previous visit. The ASCVD Risk score (Christophe Hancock., et al., 2013) failed to calculate for the following reasons:     The valid total cholesterol range is 130 to 320 mg/dL     In adults at borderline risk (5% to <7.5% 10-year ASCVD risk) or intermediate risk (7.5% to <20% 10-  year ASCVD risk), the following risk-enhancing factors and/or testing has been reviewed:        Ted et al. Circulation 2019     High-Intensity Moderate-Intensity    Percent LDL-C Reduction ³50% ³30-49%   Primary statins Atorvastatin 40-80 mg Atorvastatin 10-20 mg    Rosuvastatin 20-40 mg Rosuvastatin 5-10 mg   Secondary statins Pravastatin 40-80 mg     Simvastatin 20-40 mg     Impression and Plan:     Preventive cardiac evaluation   Dyspnea with exertion, progressive  Coronary artery disease by CT, without angina   Mild aortic stenosis/regurgitation    Mitral annular calcification without mitral stenosis   Obesity   Former smoker, now quit  Emphysema    PLAN:   1. Proceed with GXT Myoview for further evaluation of progressive dyspnea   2. Lipids are improved. Continue Lipitor and fish oil   3. Continue baby aspirin daily   4. Continue dapagliflozin for prevention in this diabetic patient, high risk for events      Follow up with me in 1 year tentatively pending stress results. Scribe's attestation: This note was scribed in the presence of Dr. Ariana Serrano M.D. By Jimena Petty RN    The scribes documentation has been prepared under my direction and personally reviewed by me in its entirety. I confirm that the note above accurately reflects all work, treatment, procedures, and medical decision making performed by me. Suzy Alejandra MD, personally performed the services described in this documentation as scribed by Jimena Petty RN in my presence, and it is both accurate and complete to the best of our ability. I will address the patient's cardiac risk factors and adjusted pharmacologic treatment as needed. In addition, I have reinforced the need for patient directed risk factor modification. All questions and concerns were addressed to the patient/family. Alternatives to my treatment were discussed. Patient verbalized understanding. Thank you for allowing us to participate in the care of this patient.     Kwame King MD, [address] [facility]  [phone] [facility]  [phone] [facility]

## 2022-03-14 ENCOUNTER — OFFICE VISIT (OUTPATIENT)
Dept: ORTHOPEDIC SURGERY | Age: 82
End: 2022-03-14
Payer: MEDICARE

## 2022-03-14 VITALS — WEIGHT: 172 LBS | BODY MASS INDEX: 34.68 KG/M2 | HEIGHT: 59 IN

## 2022-03-14 DIAGNOSIS — M54.16 LUMBAR RADICULITIS: ICD-10-CM

## 2022-03-14 DIAGNOSIS — G89.4 CHRONIC PAIN SYNDROME: ICD-10-CM

## 2022-03-14 DIAGNOSIS — M51.36 DDD (DEGENERATIVE DISC DISEASE), LUMBAR: ICD-10-CM

## 2022-03-14 PROCEDURE — 99213 OFFICE O/P EST LOW 20 MIN: CPT | Performed by: PHYSICIAN ASSISTANT

## 2022-03-14 PROCEDURE — 1090F PRES/ABSN URINE INCON ASSESS: CPT | Performed by: PHYSICIAN ASSISTANT

## 2022-03-14 PROCEDURE — G8417 CALC BMI ABV UP PARAM F/U: HCPCS | Performed by: PHYSICIAN ASSISTANT

## 2022-03-14 PROCEDURE — 1036F TOBACCO NON-USER: CPT | Performed by: PHYSICIAN ASSISTANT

## 2022-03-14 PROCEDURE — G8484 FLU IMMUNIZE NO ADMIN: HCPCS | Performed by: PHYSICIAN ASSISTANT

## 2022-03-14 PROCEDURE — 1123F ACP DISCUSS/DSCN MKR DOCD: CPT | Performed by: PHYSICIAN ASSISTANT

## 2022-03-14 PROCEDURE — 4040F PNEUMOC VAC/ADMIN/RCVD: CPT | Performed by: PHYSICIAN ASSISTANT

## 2022-03-14 PROCEDURE — G8400 PT W/DXA NO RESULTS DOC: HCPCS | Performed by: PHYSICIAN ASSISTANT

## 2022-03-14 PROCEDURE — G8427 DOCREV CUR MEDS BY ELIG CLIN: HCPCS | Performed by: PHYSICIAN ASSISTANT

## 2022-03-14 RX ORDER — HYDROCODONE BITARTRATE AND ACETAMINOPHEN 5; 325 MG/1; MG/1
1 TABLET ORAL DAILY
Qty: 30 TABLET | Refills: 0 | Status: SHIPPED | OUTPATIENT
Start: 2022-03-14 | End: 2022-04-13

## 2022-03-14 RX ORDER — HYDROCODONE BITARTRATE AND ACETAMINOPHEN 5; 325 MG/1; MG/1
1 TABLET ORAL PRN
Qty: 30 TABLET | Refills: 0 | Status: SHIPPED | OUTPATIENT
Start: 2022-05-10 | End: 2022-06-27 | Stop reason: SDUPTHER

## 2022-03-14 RX ORDER — HYDROCODONE BITARTRATE AND ACETAMINOPHEN 5; 325 MG/1; MG/1
1 TABLET ORAL PRN
Qty: 30 TABLET | Refills: 0 | Status: SHIPPED
Start: 2022-04-12 | End: 2022-04-18 | Stop reason: SDUPTHER
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
## 2022-03-18 ENCOUNTER — HOSPITAL ENCOUNTER (OUTPATIENT)
Dept: NON INVASIVE DIAGNOSTICS | Age: 82
Discharge: HOME | End: 2022-03-18
Payer: MEDICARE

## 2022-03-18 ENCOUNTER — HOSPITAL ENCOUNTER (OUTPATIENT)
Dept: NUCLEAR MEDICINE | Age: 82
Discharge: HOME | End: 2022-03-18
Payer: MEDICARE

## 2022-03-18 DIAGNOSIS — R06.02 SHORTNESS OF BREATH: ICD-10-CM

## 2022-03-18 LAB
LV EF: 60 %
LVEF MODALITY: NORMAL

## 2022-03-18 PROCEDURE — 93017 CV STRESS TEST TRACING ONLY: CPT

## 2022-03-18 PROCEDURE — 78452 HT MUSCLE IMAGE SPECT MULT: CPT

## 2022-03-18 PROCEDURE — 6360000002 HC RX W HCPCS: Performed by: INTERNAL MEDICINE

## 2022-03-18 PROCEDURE — 3430000000 HC RX DIAGNOSTIC RADIOPHARMACEUTICAL: Performed by: INTERNAL MEDICINE

## 2022-03-18 PROCEDURE — A9502 TC99M TETROFOSMIN: HCPCS | Performed by: INTERNAL MEDICINE

## 2022-03-18 RX ADMIN — REGADENOSON 0.4 MG: 0.08 INJECTION, SOLUTION INTRAVENOUS at 14:10

## 2022-03-18 RX ADMIN — TETROFOSMIN 33.9 MILLICURIE: 1.38 INJECTION, POWDER, LYOPHILIZED, FOR SOLUTION INTRAVENOUS at 14:10

## 2022-03-18 RX ADMIN — TETROFOSMIN 11.6 MILLICURIE: 1.38 INJECTION, POWDER, LYOPHILIZED, FOR SOLUTION INTRAVENOUS at 14:00

## 2022-03-21 ENCOUNTER — TELEPHONE (OUTPATIENT)
Dept: CARDIOLOGY CLINIC | Age: 82
End: 2022-03-21

## 2022-03-21 NOTE — TELEPHONE ENCOUNTER
----- Message from Don Barrett MD sent at 3/20/2022  7:07 PM EDT -----  Pls let Mady Gonzalez know her stress test was reassuring. No evidence of underlying blockage. Follow up in 1 year as instructed prior. No changes.

## 2022-04-04 DIAGNOSIS — E78.1 HYPERTRIGLYCERIDEMIA: Chronic | ICD-10-CM

## 2022-04-04 DIAGNOSIS — E78.5 DYSLIPIDEMIA WITH HIGH LDL AND LOW HDL: ICD-10-CM

## 2022-04-06 RX ORDER — CHLORAL HYDRATE 500 MG
CAPSULE ORAL
Qty: 180 CAPSULE | Refills: 3 | Status: SHIPPED | OUTPATIENT
Start: 2022-04-06

## 2022-04-18 ENCOUNTER — OFFICE VISIT (OUTPATIENT)
Dept: FAMILY MEDICINE CLINIC | Age: 82
End: 2022-04-18
Payer: MEDICARE

## 2022-04-18 VITALS
BODY MASS INDEX: 34.64 KG/M2 | SYSTOLIC BLOOD PRESSURE: 140 MMHG | OXYGEN SATURATION: 99 % | WEIGHT: 171.8 LBS | DIASTOLIC BLOOD PRESSURE: 60 MMHG | HEART RATE: 71 BPM | HEIGHT: 59 IN

## 2022-04-18 DIAGNOSIS — I25.10 CORONARY ARTERY DISEASE DUE TO CALCIFIED CORONARY LESION: ICD-10-CM

## 2022-04-18 DIAGNOSIS — E11.69 TYPE 2 DIABETES MELLITUS WITH OTHER SPECIFIED COMPLICATION, WITHOUT LONG-TERM CURRENT USE OF INSULIN (HCC): Primary | ICD-10-CM

## 2022-04-18 DIAGNOSIS — I35.0 AORTIC VALVE STENOSIS, ETIOLOGY OF CARDIAC VALVE DISEASE UNSPECIFIED: ICD-10-CM

## 2022-04-18 DIAGNOSIS — F32.89 OTHER DEPRESSION: ICD-10-CM

## 2022-04-18 DIAGNOSIS — E78.5 DYSLIPIDEMIA WITH HIGH LDL AND LOW HDL: ICD-10-CM

## 2022-04-18 DIAGNOSIS — I10 ESSENTIAL HYPERTENSION: ICD-10-CM

## 2022-04-18 DIAGNOSIS — M51.36 DDD (DEGENERATIVE DISC DISEASE), LUMBAR: ICD-10-CM

## 2022-04-18 DIAGNOSIS — I25.84 CORONARY ARTERY DISEASE DUE TO CALCIFIED CORONARY LESION: ICD-10-CM

## 2022-04-18 DIAGNOSIS — J44.9 CHRONIC OBSTRUCTIVE PULMONARY DISEASE, UNSPECIFIED COPD TYPE (HCC): ICD-10-CM

## 2022-04-18 LAB — HBA1C MFR BLD: 7.7 %

## 2022-04-18 PROCEDURE — 99215 OFFICE O/P EST HI 40 MIN: CPT | Performed by: FAMILY MEDICINE

## 2022-04-18 PROCEDURE — G8400 PT W/DXA NO RESULTS DOC: HCPCS | Performed by: FAMILY MEDICINE

## 2022-04-18 PROCEDURE — 4040F PNEUMOC VAC/ADMIN/RCVD: CPT | Performed by: FAMILY MEDICINE

## 2022-04-18 PROCEDURE — G8427 DOCREV CUR MEDS BY ELIG CLIN: HCPCS | Performed by: FAMILY MEDICINE

## 2022-04-18 PROCEDURE — 1123F ACP DISCUSS/DSCN MKR DOCD: CPT | Performed by: FAMILY MEDICINE

## 2022-04-18 PROCEDURE — 3023F SPIROM DOC REV: CPT | Performed by: FAMILY MEDICINE

## 2022-04-18 PROCEDURE — G8417 CALC BMI ABV UP PARAM F/U: HCPCS | Performed by: FAMILY MEDICINE

## 2022-04-18 PROCEDURE — 83036 HEMOGLOBIN GLYCOSYLATED A1C: CPT | Performed by: FAMILY MEDICINE

## 2022-04-18 PROCEDURE — 1036F TOBACCO NON-USER: CPT | Performed by: FAMILY MEDICINE

## 2022-04-18 PROCEDURE — 3051F HG A1C>EQUAL 7.0%<8.0%: CPT | Performed by: FAMILY MEDICINE

## 2022-04-18 PROCEDURE — 1090F PRES/ABSN URINE INCON ASSESS: CPT | Performed by: FAMILY MEDICINE

## 2022-04-18 RX ORDER — DICYCLOMINE HYDROCHLORIDE 10 MG/1
10 CAPSULE ORAL 4 TIMES DAILY PRN
Qty: 90 CAPSULE | Refills: 0 | Status: SHIPPED | OUTPATIENT
Start: 2022-04-18

## 2022-04-18 RX ORDER — FERROUS SULFATE 325(65) MG
325 TABLET ORAL
COMMUNITY

## 2022-04-18 NOTE — PROGRESS NOTES
Arrie Bamberger presents for   Chief Complaint   Patient presents with    Diabetes     pt states that she is here for a 3 motnh f/u, is fasting for bw    Hypertension    Hyperlipidemia    Depression    COPD     pt states that she andino not need to use the [place] too often.  Mouth Lesions     pt states that the Valacyclovir is no longer helping for the cold sores        ASSESSMENT:   Diagnosis Orders   1. Type 2 diabetes mellitus with other specified complication, without long-term current use of insulin ([address] worsened, A1c today 7.7. Farxiga dose was increased to 10 mg. She will continue on metformin 1000 mg twice a day. She will work harder on a low-carb heart healthy diet POCT glycosylated hemoglobin (Hb A1C)    dapagliflozin (FARXIGA) 10 MG tablet   2. Essential hypertension, stable continue losartan 100 hydrochlorothiazide 12.5    3. Coronary artery disease due to calcified coronary lesion, stable continue aspirin, Lipitor 40 mg and follow-up with cardiology    4. Chronic obstructive pulmonary disease, unspecified COPD type ([address] stable continue inhalers, she has a CAT scan scheduled for next month and pulmonology appointment that is well    5. Aortic valve stenosis, etiology of cardiac valve disease unspecified, stable asymptomatic    6. Dyslipidemia with high LDL and low HDL, well controlled continue Lipitor 40 mg    7. DDD (degenerative disc disease), lumbar, stable continue follow-up with pain managed    8. Other depression, stable continue Lexapro 20 mg         Plan:  1)  Medication: Persico dose increased to 10 mg daily, other medications are working and tolerated, continue as listed  2)  Recheck in 3 months, sooner should new symptoms or problems arise.   3) LLR, reviewed    I spent a total of 40* minutes with the patient, reviewing previous notes, consults, test results, imaging ,discussing chronic and acute conditions  She is given written instructions about her [place] adjustment  SUBJECTIVE:  Jessee Blank is a [de-identified] y.o. female who presents for evaluation of diabetes, CAD, COPD, aortic valve stenosis, degenerative disc disease, depression hypertension and hyperlipidemia. She denies any symptoms referable to her elevated blood pressure. Specifically denies chest pain, palpitations, , orthopnea, PND or peripheral edema or neuro sx. No anorexia,  or leg cramps noted. Current medication regimen is as listed below. She denies any side effects of medication, and has been taking it regularly. Lab Results   Component Value Date    LDLCALC 63 01/18/2022    LDLDIRECT 123 (H) 01/03/2018       3 months ago, patient's A1c was 7.4. At that time Farxiga 5 mg was added to metformin 1000 mg twice a day. She does not check her blood sugars very often. She says she likes to eat 1-2 oranges every day. Her A1c today was 7.7. As result we increase the dose of her Farxiga to 10 mg. She will continue metformin 1000 mg twice a day. She will work harder on diabetic food choices. She brought her blood pressure cuff from home. Her blood pressure cuff was reading a little higher than our numbers here in the office. She will continue on losartan/HCTZ 100/12. 5. Her CAD is stable. She saw cardiology she had nuclear medicine stress test that was reviewed. She is due back to see them in February 2023. She will continue on aspirin and Lipitor 40 mg. Her cholesterol was checked at last appointment and is under good control she will continue this. She follows pulmonology for her COPD and a pulmonary nodule. She had a CAT scan done in February she has another one scheduled for May. She is a follow-up appointment as well. She will continue on her inhalers. Her aortic valve stenosis is stable asymptomatic she will continue to follow-up with cardiology. Her back pain is controlled by Vicodin and she sees pain management consistently.   She says her depression is about the same she will continue on Lexapro 20 mg    Current Outpatient Medications   Medication Sig Dispense Refill    ferrous sulfate (IRON 325) 325 (65 Fe) MG tablet Take 325 mg by mouth daily (with breakfast)      dicyclomine (BENTYL) 10 MG capsule Take 1 capsule by mouth 4 times daily as needed (cramping) 90 capsule 0    dapagliflozin (FARXIGA) 10 MG tablet Take 1 tablet by mouth every morning 90 tablet 1    Omega-3 Fatty Acids (FISH OIL) 1000 MG CAPS TAKE TWO CAPSULES BY MOUTH TWICE A  capsule 3    [START ON 5/10/2022] HYDROcodone-acetaminophen (NORCO) 5-325 MG per tablet Take 1 tablet by mouth as needed for Pain for up to 30 days. One tablet a day as needed 30 tablet 0    metFORMIN (GLUCOPHAGE) 1000 MG tablet TAKE ONE TABLET BY MOUTH TWICE A DAY WITH MEALS 180 tablet 1    losartan-hydroCHLOROthiazide (HYZAAR) 100-12.5 MG per tablet TAKE ONE TABLET BY MOUTH DAILY 90 tablet 1    aspirin 81 MG EC tablet Take 81 mg by mouth daily      atorvastatin (LIPITOR) 40 MG tablet Take 1 tablet by mouth daily 90 tablet 1    ipratropium (ATROVENT) 0.03 % nasal spray 2 sprays by Each Nostril route every 12 hours 30 mL 3    escitalopram (LEXAPRO) 20 MG tablet Take 1 tablet by mouth daily 90 tablet 1    budesonide-formoterol (SYMBICORT) 160-4.5 MCG/ACT AERO Inhale 2 puffs into the lungs 2 times daily 10.2 g 3    pantoprazole (PROTONIX) 40 MG tablet       ondansetron (ZOFRAN-ODT) 4 MG disintegrating tablet       valACYclovir (VALTREX) 1 g tablet Take 1 tablet by mouth every 12 hours 20 tablet 1    Budeson-Glycopyrrol-Formoterol (BREZTRI AEROSPHERE) 160-9-4.8 MCG/ACT AERO Inhale 2 puffs into the lungs 2 times daily 1 Inhaler 0    Collagen Hydrolysate POWD by Does not apply route       No current facility-administered medications for this visit.        Allergies   Allergen Reactions    Diovan [Valsartan] Anaphylaxis     Throat swelling      Bee Venom     Formaldehyde Itching    Iodine Hives     IVP dye    Lisinopril      Throat swelling  Angioedema      Niaspan [Niacin Er]      facial rash and itching    Amoxicillin Rash, Hives and Itching       Social History     Tobacco Use    Smoking status: Former Smoker     Packs/day: 1.00     Years: 50.00     Pack years: 50.00     Types: Cigarettes     Start date: 5     Quit date: 2017     Years since quittin.9    Smokeless tobacco: Never Used    Tobacco comment: 16- using chantix again as starting to sneak a few cigarettes. quit again with Chantix in . smoking again 19. \"quit\" 17, but still occ cigarette   Substance Use Topics    Alcohol use: No     Alcohol/week: 0.0 standard drinks       OBJECTIVE:   BP (!) 140/60   Pulse 71   Ht 4' 11\" (1.499 m)   Wt 171 lb 12.8 oz (77.9 kg)   SpO2 99%   BMI 34.70 kg/m²   NAD  Neck no bruit or JVD  S1 and S2 normal, 2/6 murmurs, clicks, gallops or rubs. Regular rate and rhythm. Chest is clear; no wheezes or rales. No edema or JVD. Neuro alert, no CN or motor deficits  Psych nl mood, thought and judgement                EMR Dragon/transcription disclaimer:  Much of this encounter note is electronic transcription/translation of spoken language to printed texts. The electronic translation of spoken language may be erroneous, or at times, nonsensical words or phrases may be inadvertently transcribed.   Although I have reviewed the note for such errors, some may still exist.

## 2022-05-17 ENCOUNTER — HOSPITAL ENCOUNTER (OUTPATIENT)
Dept: CT IMAGING | Age: 82
Discharge: HOME | End: 2022-05-17
Payer: MEDICARE

## 2022-05-17 DIAGNOSIS — R91.1 LUNG NODULE: ICD-10-CM

## 2022-05-17 PROCEDURE — 71250 CT THORAX DX C-: CPT

## 2022-05-23 ENCOUNTER — OFFICE VISIT (OUTPATIENT)
Dept: PULMONOLOGY | Age: 82
End: 2022-05-23
Payer: MEDICARE

## 2022-05-23 VITALS
TEMPERATURE: 97.6 F | DIASTOLIC BLOOD PRESSURE: 72 MMHG | RESPIRATION RATE: 18 BRPM | WEIGHT: 170 LBS | OXYGEN SATURATION: 95 % | SYSTOLIC BLOOD PRESSURE: 134 MMHG | HEIGHT: 59 IN | BODY MASS INDEX: 34.27 KG/M2 | HEART RATE: 77 BPM

## 2022-05-23 DIAGNOSIS — J44.9 CHRONIC OBSTRUCTIVE PULMONARY DISEASE, UNSPECIFIED COPD TYPE (HCC): ICD-10-CM

## 2022-05-23 DIAGNOSIS — R91.1 LUNG NODULE: Primary | ICD-10-CM

## 2022-05-23 DIAGNOSIS — G47.10 HYPERSOMNOLENCE: ICD-10-CM

## 2022-05-23 DIAGNOSIS — J32.9 SINUSITIS, UNSPECIFIED CHRONICITY, UNSPECIFIED LOCATION: ICD-10-CM

## 2022-05-23 DIAGNOSIS — Z87.891 HISTORY OF TOBACCO ABUSE: ICD-10-CM

## 2022-05-23 PROCEDURE — 99214 OFFICE O/P EST MOD 30 MIN: CPT | Performed by: INTERNAL MEDICINE

## 2022-05-23 PROCEDURE — 3023F SPIROM DOC REV: CPT | Performed by: INTERNAL MEDICINE

## 2022-05-23 PROCEDURE — 1123F ACP DISCUSS/DSCN MKR DOCD: CPT | Performed by: INTERNAL MEDICINE

## 2022-05-23 PROCEDURE — G8427 DOCREV CUR MEDS BY ELIG CLIN: HCPCS | Performed by: INTERNAL MEDICINE

## 2022-05-23 PROCEDURE — 1090F PRES/ABSN URINE INCON ASSESS: CPT | Performed by: INTERNAL MEDICINE

## 2022-05-23 PROCEDURE — G8417 CALC BMI ABV UP PARAM F/U: HCPCS | Performed by: INTERNAL MEDICINE

## 2022-05-23 PROCEDURE — 1036F TOBACCO NON-USER: CPT | Performed by: INTERNAL MEDICINE

## 2022-05-23 PROCEDURE — G8400 PT W/DXA NO RESULTS DOC: HCPCS | Performed by: INTERNAL MEDICINE

## 2022-05-23 RX ORDER — IPRATROPIUM BROMIDE 42 UG/1
2 SPRAY, METERED NASAL 4 TIMES DAILY
Qty: 1 EACH | Refills: 5 | Status: SHIPPED | OUTPATIENT
Start: 2022-05-23 | End: 2022-07-18 | Stop reason: SDUPTHER

## 2022-05-23 ASSESSMENT — ENCOUNTER SYMPTOMS
GASTROINTESTINAL NEGATIVE: 1
WHEEZING: 0
SWOLLEN GLANDS: 0
ABDOMINAL PAIN: 0
HEMOPTYSIS: 0
SHORTNESS OF BREATH: 1
VOMITING: 0
EYES NEGATIVE: 1
ORTHOPNEA: 0
SORE THROAT: 0
ALLERGIC/IMMUNOLOGIC NEGATIVE: 1
RHINORRHEA: 0
SPUTUM PRODUCTION: 0

## 2022-05-23 NOTE — PROGRESS NOTES
Lorena Nolen (:  1940) is a [de-identified] y.o. female,New patient, here for evaluation of the following chief complaint(s):  Follow-up (3 month) and Pulmonary Nodule         ASSESSMENT/PLAN:  1. Lung nodule  2. History of tobacco abuse  3. Sinusitis, unspecified chronicity, unspecified location  4. Chronic obstructive pulmonary disease, unspecified COPD type ([facility] [address]  5. Hypersomnolence          Low-dose CT scan done 10/28/2021     Impression   New noncalcified nodule in the left lower lobe measuring 7 mm.  There is mild   underlying emphysema.       Severe coronary artery calcification       LUNG RADS:   Per ACR Lung-RADS Version 1.1       Category 4A,   This is the 7 mm nodule                Repeat CT scan done 2022    Impression   1. Unchanged solid subcentimeter bilateral pulmonary nodules including a 9 mm   solid left lower lobe pulmonary nodule.  Recommend PET-CT for further   evaluation. At this point the options are as follows  1. Repeat CT scan in 3 months  2. PET CT scan    Will get repeat ct scan of 3 months    Sinusitis  Will give  Nasal atrovent 0.06% and 1-2 sprays, at night and up to 3-4 times if  Needed  Will increase to 0.06%    Hypersomnolence        Insomnia  Could use melatonin up to 20 mg at night   however, will need to take everynight            COPD- not  PFT done 2021  Spirometry shows FVC to be 86%, FEV1 to be 82%, FEV1 to FVC ratio was  94%, FEF25%-75% was 59%. The patient did not have any significant  postbronchodilator improvement. Lung volume shows the total lung  capacity was normal.  The patient does have some air trapping. The  patient also has decrease in ERV. The patient's diffusion capacity when  adjusted for volume was normal.    No inhalers needed          RTC in 3 months        No follow-ups on file.          Subjective   SUBJECTIVE/OBJECTIVE:  Consult from Dr. Nissa Claire  Initially seen 2021  Reason for consult is lung nodule        Had screening Lung ct  Stop smoking in 2017      Fhx:  Lung cancer bro, mother  Throat cancer: father        No issues with breathing  Only when sleeping will get sob    +snore  No headache  Dry mouth, no  Sweating at night, at times    Sleepiness/fatigue  Sleep for many years  Insomnia at initiation    Naps in the day    Sinus running      Also having  Insomnia  And snoring  Sleepiness  +sweating      Shortness of Breath  This is a chronic problem. The current episode started more than 1 year ago. The problem occurs intermittently. The problem has been waxing and waning. Pertinent negatives include no abdominal pain, chest pain, claudication, coryza, ear pain, fever, headaches, hemoptysis, leg pain, leg swelling, neck pain, orthopnea, PND, rash, rhinorrhea, sore throat, sputum production, swollen glands, syncope, vomiting or wheezing. Review of Systems   Constitutional: Negative. Negative for fever. HENT: Negative. Negative for ear pain, rhinorrhea and sore throat. Eyes: Negative. Respiratory: Positive for shortness of breath. Negative for hemoptysis, sputum production and wheezing. Cardiovascular: Negative. Negative for chest pain, orthopnea, claudication, leg swelling, syncope and PND. Gastrointestinal: Negative. Negative for abdominal pain and vomiting. Endocrine: Negative. Genitourinary: Negative. Musculoskeletal: Negative. Negative for neck pain. Skin: Negative. Negative for rash. Allergic/Immunologic: Negative. Neurological: Negative. Negative for headaches. Hematological: Negative. Psychiatric/Behavioral: Negative. Objective   Physical Exam  Vitals and nursing note reviewed. Constitutional:       General: She is not in acute distress. Appearance: Normal appearance. She is not ill-appearing. HENT:      Head: Normocephalic and atraumatic.       Right Ear: External ear normal.      Left Ear: External ear normal.      Nose: Nose normal.      Mouth/Throat:      Mouth: Mucous membranes are moist.      Pharynx: Oropharynx is clear. Comments: Mallampati 3  Eyes:      General: No scleral icterus. Extraocular Movements: Extraocular movements intact. Conjunctiva/sclera: Conjunctivae normal.      Pupils: Pupils are equal, round, and reactive to light. Cardiovascular:      Rate and Rhythm: Normal rate and regular rhythm. Pulses: Normal pulses. Heart sounds: Normal heart sounds. No murmur heard. No friction rub. Pulmonary:      Effort: No respiratory distress. Breath sounds: No stridor. No wheezing or rales. Comments: Dec bs bl  Abdominal:      General: Abdomen is flat. Bowel sounds are normal. There is no distension. Tenderness: There is no abdominal tenderness. There is no guarding. Musculoskeletal:         General: No swelling or tenderness. Normal range of motion. Cervical back: Normal range of motion and neck supple. No rigidity. Skin:     General: Skin is warm and dry. Coloration: Skin is not jaundiced. Neurological:      General: No focal deficit present. Mental Status: She is alert and oriented to person, place, and time. Mental status is at baseline. Cranial Nerves: No cranial nerve deficit. Sensory: No sensory deficit. Motor: No weakness. Gait: Gait normal.   Psychiatric:         Mood and Affect: Mood normal.         Thought Content:  Thought content normal.         Judgment: Judgment normal.                  An electronic signature was used to authenticate this note.    --Adamaris Zamarripa MD

## 2022-05-23 NOTE — PATIENT INSTRUCTIONS
ASSESSMENT/PLAN:  1. Lung nodule  2. History of tobacco abuse  3. Sinusitis, unspecified chronicity, unspecified location  4. Chronic obstructive pulmonary disease, unspecified COPD type ([facility] [address]  5. Hypersomnolence          Low-dose CT scan done 10/28/2021     Impression   New noncalcified nodule in the left lower lobe measuring 7 mm.  There is mild   underlying emphysema.       Severe coronary artery calcification       LUNG RADS:   Per ACR Lung-RADS Version 1.1       Category 4A,   This is the 7 mm nodule                Repeat CT scan done 5/17/2022    Impression   1. Unchanged solid subcentimeter bilateral pulmonary nodules including a 9 mm   solid left lower lobe pulmonary nodule.  Recommend PET-CT for further   evaluation. At this point the options are as follows  1. Repeat CT scan in 3 months  2. PET CT scan    Will get repeat ct scan of 3 months    Sinusitis  Will give  Nasal atrovent 0.06% and 1-2 sprays, at night and up to 3-4 times if  Needed  Will increase to 0.06%    Hypersomnolence        Insomnia  Could use melatonin up to 20 mg at night   however, will need to take everynight            COPD- not  PFT done 11/19/2021  Spirometry shows FVC to be 86%, FEV1 to be 82%, FEV1 to FVC ratio was  94%, FEF25%-75% was 59%. The patient did not have any significant  postbronchodilator improvement. Lung volume shows the total lung  capacity was normal.  The patient does have some air trapping. The  patient also has decrease in ERV. The patient's diffusion capacity when  adjusted for volume was normal.    No inhalers needed          RTC in 3 months    Remember to bring a list of pulmonary medications and any CPAP or BiPAP machines to your next appointment with the office. Please keep all of your future appointments scheduled by [facility] office.  Out of respect for other patients and providers, you may be asked to reschedule your appointment if you arrive later survey. We hope you will choose us in the future for your healthcare needs. Pt instructed of all future appointment dates & times, including radiology, labs, procedures & referrals. If procedures were scheduled preparation instructions provided. Instructions on future appointments with [facility] were given.

## 2022-05-23 NOTE — PROGRESS NOTES
MA Communication:   The following orders are received by verbal communication from Clint Olivarez MD    Orders include:  CT in 3 months scheduled 8/29/22       3 mo fu scheduled 8/30/22

## 2022-05-23 NOTE — LETTER
5/23/22        Trenda See      I have seen this patient in the office today and wanted to communicate my findings and recommendations. Patient Instructions          ASSESSMENT/PLAN:  1. Lung nodule  2. History of tobacco abuse  3. Sinusitis, unspecified chronicity, unspecified location  4. Chronic obstructive pulmonary disease, unspecified COPD type ([facility] [address]  5. Hypersomnolence          Low-dose CT scan done 10/28/2021     Impression   New noncalcified nodule in the left lower lobe measuring 7 mm.  There is mild   underlying emphysema.       Severe coronary artery calcification       LUNG RADS:   Per ACR Lung-RADS Version 1.1       Category 4A,   This is the 7 mm nodule                Repeat CT scan done 5/17/2022    Impression   1. Unchanged solid subcentimeter bilateral pulmonary nodules including a 9 mm   solid left lower lobe pulmonary nodule.  Recommend PET-CT for further   evaluation. At this point the options are as follows  1. Repeat CT scan in 3 months  2. PET CT scan    Will get repeat ct scan of 3 months    Sinusitis  Will give  Nasal atrovent 0.06% and 1-2 sprays, at night and up to 3-4 times if  Needed  Will increase to 0.06%    Hypersomnolence        Insomnia  Could use melatonin up to 20 mg at night   however, will need to take everynight            COPD- not  PFT done 11/19/2021  Spirometry shows FVC to be 86%, FEV1 to be 82%, FEV1 to FVC ratio was  94%, FEF25%-75% was 59%. The patient did not have any significant  postbronchodilator improvement. Lung volume shows the total lung  capacity was normal.  The patient does have some air trapping. The  patient also has decrease in ERV.   The patient's diffusion capacity when  adjusted for volume was normal.    No inhalers needed          RTC in 3 months                     Thank you for allowing me to assist in the care of the Jas Whitman MD

## 2022-06-08 DIAGNOSIS — E78.5 DYSLIPIDEMIA WITH HIGH LDL AND LOW HDL: ICD-10-CM

## 2022-06-08 DIAGNOSIS — E78.1 HYPERTRIGLYCERIDEMIA: Chronic | ICD-10-CM

## 2022-06-09 RX ORDER — ATORVASTATIN CALCIUM 40 MG/1
TABLET, FILM COATED ORAL
Qty: 90 TABLET | Refills: 2 | Status: SHIPPED | OUTPATIENT
Start: 2022-06-09

## 2022-06-27 ENCOUNTER — OFFICE VISIT (OUTPATIENT)
Dept: ORTHOPEDIC SURGERY | Age: 82
End: 2022-06-27
Payer: MEDICARE

## 2022-06-27 VITALS — HEIGHT: 59 IN | WEIGHT: 170 LBS | BODY MASS INDEX: 34.27 KG/M2

## 2022-06-27 DIAGNOSIS — G89.4 CHRONIC PAIN SYNDROME: ICD-10-CM

## 2022-06-27 DIAGNOSIS — M51.36 DDD (DEGENERATIVE DISC DISEASE), LUMBAR: ICD-10-CM

## 2022-06-27 DIAGNOSIS — M54.16 LUMBAR RADICULITIS: ICD-10-CM

## 2022-06-27 PROCEDURE — 1123F ACP DISCUSS/DSCN MKR DOCD: CPT | Performed by: PHYSICIAN ASSISTANT

## 2022-06-27 PROCEDURE — 1090F PRES/ABSN URINE INCON ASSESS: CPT | Performed by: PHYSICIAN ASSISTANT

## 2022-06-27 PROCEDURE — G8400 PT W/DXA NO RESULTS DOC: HCPCS | Performed by: PHYSICIAN ASSISTANT

## 2022-06-27 PROCEDURE — G8417 CALC BMI ABV UP PARAM F/U: HCPCS | Performed by: PHYSICIAN ASSISTANT

## 2022-06-27 PROCEDURE — G8427 DOCREV CUR MEDS BY ELIG CLIN: HCPCS | Performed by: PHYSICIAN ASSISTANT

## 2022-06-27 PROCEDURE — 99213 OFFICE O/P EST LOW 20 MIN: CPT | Performed by: PHYSICIAN ASSISTANT

## 2022-06-27 PROCEDURE — 1036F TOBACCO NON-USER: CPT | Performed by: PHYSICIAN ASSISTANT

## 2022-06-27 RX ORDER — HYDROCODONE BITARTRATE AND ACETAMINOPHEN 5; 325 MG/1; MG/1
1 TABLET ORAL PRN
Qty: 30 TABLET | Refills: 0 | Status: SHIPPED | OUTPATIENT
Start: 2022-08-23 | End: 2022-09-26 | Stop reason: SDUPTHER

## 2022-06-27 RX ORDER — HYDROCODONE BITARTRATE AND ACETAMINOPHEN 5; 325 MG/1; MG/1
1 TABLET ORAL PRN
Qty: 30 TABLET | Refills: 0 | Status: SHIPPED | OUTPATIENT
Start: 2022-07-25 | End: 2022-08-24

## 2022-06-27 RX ORDER — HYDROCODONE BITARTRATE AND ACETAMINOPHEN 5; 325 MG/1; MG/1
1 TABLET ORAL PRN
Qty: 30 TABLET | Refills: 0 | Status: SHIPPED
Start: 2022-06-27 | End: 2022-07-18 | Stop reason: SDUPTHER
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
## 2022-07-18 ENCOUNTER — OFFICE VISIT (OUTPATIENT)
Dept: FAMILY MEDICINE CLINIC | Age: 82
End: 2022-07-18
Payer: MEDICARE

## 2022-07-18 VITALS
HEART RATE: 68 BPM | WEIGHT: 165.4 LBS | BODY MASS INDEX: 33.34 KG/M2 | SYSTOLIC BLOOD PRESSURE: 158 MMHG | HEIGHT: 59 IN | OXYGEN SATURATION: 98 % | DIASTOLIC BLOOD PRESSURE: 70 MMHG

## 2022-07-18 DIAGNOSIS — E11.69 TYPE 2 DIABETES MELLITUS WITH OTHER SPECIFIED COMPLICATION, WITHOUT LONG-TERM CURRENT USE OF INSULIN (HCC): Primary | ICD-10-CM

## 2022-07-18 DIAGNOSIS — E78.5 DYSLIPIDEMIA WITH HIGH LDL AND LOW HDL: ICD-10-CM

## 2022-07-18 DIAGNOSIS — I25.84 CORONARY ARTERY DISEASE DUE TO CALCIFIED CORONARY LESION: ICD-10-CM

## 2022-07-18 DIAGNOSIS — I10 ESSENTIAL HYPERTENSION: ICD-10-CM

## 2022-07-18 DIAGNOSIS — I25.10 CORONARY ARTERY DISEASE DUE TO CALCIFIED CORONARY LESION: ICD-10-CM

## 2022-07-18 DIAGNOSIS — J44.9 CHRONIC OBSTRUCTIVE PULMONARY DISEASE, UNSPECIFIED COPD TYPE (HCC): ICD-10-CM

## 2022-07-18 DIAGNOSIS — I35.0 AORTIC VALVE STENOSIS, ETIOLOGY OF CARDIAC VALVE DISEASE UNSPECIFIED: ICD-10-CM

## 2022-07-18 DIAGNOSIS — F32.89 OTHER DEPRESSION: ICD-10-CM

## 2022-07-18 LAB
A/G RATIO: 1.7 (ref 1.1–2.2)
ALBUMIN SERPL-MCNC: 4.5 G/DL (ref 3.4–5)
ALP BLD-CCNC: 58 U/L (ref 40–129)
ALT SERPL-CCNC: 14 U/L (ref 10–40)
ANION GAP SERPL CALCULATED.3IONS-SCNC: 14 MMOL/L (ref 3–16)
AST SERPL-CCNC: 13 U/L (ref 15–37)
BILIRUB SERPL-MCNC: 0.3 MG/DL (ref 0–1)
BUN BLDV-MCNC: 10 MG/DL (ref 7–20)
CALCIUM SERPL-MCNC: 9.5 MG/DL (ref 8.3–10.6)
CHLORIDE BLD-SCNC: 101 MMOL/L (ref 99–110)
CHOLESTEROL, TOTAL: 134 MG/DL (ref 0–199)
CO2: 27 MMOL/L (ref 21–32)
CREAT SERPL-MCNC: 0.5 MG/DL (ref 0.6–1.2)
GFR AFRICAN AMERICAN: >60
GFR NON-AFRICAN AMERICAN: >60
GLUCOSE BLD-MCNC: 111 MG/DL (ref 70–99)
HBA1C MFR BLD: 6.7 %
HDLC SERPL-MCNC: 31 MG/DL (ref 40–60)
LDL CHOLESTEROL CALCULATED: 57 MG/DL
POTASSIUM SERPL-SCNC: 4 MMOL/L (ref 3.5–5.1)
SODIUM BLD-SCNC: 142 MMOL/L (ref 136–145)
TOTAL PROTEIN: 7.1 G/DL (ref 6.4–8.2)
TRIGL SERPL-MCNC: 228 MG/DL (ref 0–150)
VLDLC SERPL CALC-MCNC: 46 MG/DL

## 2022-07-18 PROCEDURE — G8427 DOCREV CUR MEDS BY ELIG CLIN: HCPCS | Performed by: FAMILY MEDICINE

## 2022-07-18 PROCEDURE — G8400 PT W/DXA NO RESULTS DOC: HCPCS | Performed by: FAMILY MEDICINE

## 2022-07-18 PROCEDURE — 99215 OFFICE O/P EST HI 40 MIN: CPT | Performed by: FAMILY MEDICINE

## 2022-07-18 PROCEDURE — G8417 CALC BMI ABV UP PARAM F/U: HCPCS | Performed by: FAMILY MEDICINE

## 2022-07-18 PROCEDURE — 83036 HEMOGLOBIN GLYCOSYLATED A1C: CPT | Performed by: FAMILY MEDICINE

## 2022-07-18 PROCEDURE — 1123F ACP DISCUSS/DSCN MKR DOCD: CPT | Performed by: FAMILY MEDICINE

## 2022-07-18 PROCEDURE — 3023F SPIROM DOC REV: CPT | Performed by: FAMILY MEDICINE

## 2022-07-18 PROCEDURE — 1090F PRES/ABSN URINE INCON ASSESS: CPT | Performed by: FAMILY MEDICINE

## 2022-07-18 PROCEDURE — 3051F HG A1C>EQUAL 7.0%<8.0%: CPT | Performed by: FAMILY MEDICINE

## 2022-07-18 PROCEDURE — 1036F TOBACCO NON-USER: CPT | Performed by: FAMILY MEDICINE

## 2022-07-18 PROCEDURE — 36415 COLL VENOUS BLD VENIPUNCTURE: CPT | Performed by: FAMILY MEDICINE

## 2022-07-18 RX ORDER — LOSARTAN POTASSIUM AND HYDROCHLOROTHIAZIDE 12.5; 1 MG/1; MG/1
TABLET ORAL
Qty: 90 TABLET | Refills: 1 | Status: SHIPPED | OUTPATIENT
Start: 2022-07-18

## 2022-07-18 NOTE — PROGRESS NOTES
Donte Multani presents for   Chief Complaint   Patient presents with    Diabetes     Pt states that is here for a 3 month f/u, is fasting for bw     Hyperlipidemia    Hypertension    Depression        ASSESSMENT:   Diagnosis Orders   1. Type 2 diabetes mellitus with other specified complication, without long-term current use of insulin ([facility] [address] much improved, A1c today 6.7 patient was encouraged to keep building on her efforts. She will continue on Farxiga 10 mg and metformin 1000 mg twice a day POCT glycosylated hemoglobin (Hb A1C)      2. Essential hypertension, uncertain control, blood pressure elevated here today. Patient is to monitor her blood pressure daily and send a [email or web address] message in 3 days with an update on her numbers. If still elevated at home will add amlodipine 5 mg to her losartan HCTZ 100/12.5 Comprehensive Metabolic Panel    losartan-hydroCHLOROthiazide (HYZAAR) 100-12.5 MG per tablet      3. Coronary artery disease due to calcified coronary lesion, continue 81 mg aspirin and Lipitor 40 mg. Due to see cardiology in March 2023       4. Chronic obstructive pulmonary disease, unspecified COPD type ([facility] [address] stable, continue follow-up with pulmonology       5. Aortic valve stenosis, etiology of cardiac valve disease unspecified, stable asymptomatic continue cardiology follow-up       6. Dyslipidemia with high LDL and low HDL, continue Lipitor 40 mg labs are pending Lipid Panel      7. Other depression, stable on Lexapro 20 mg            Plan:  1)  Medication: continue current medication regimen unchanged, medications are working and tolerated, continue as listed  2)  Recheck in 3 months, sooner should new symptoms or problems arise.   3) LLR    I spent a total of 40* minutes with the patient, reviewing previous notes, consults, test results, imaging ,discussing chronic and acute conditions        SUBJECTIVE:  Donte Multani is a [de-identified] y.o. female who presents for evaluation of diabetes, CAD, COPD, aortic valve stenosis, depression, hypertension and hyperlipidemia. She indicates that she is feeling well and denies any symptoms referable to her elevated blood pressure. Specifically denies chest pain, palpitations, dyspnea, orthopnea, PND or peripheral edema or neuro sx. No anorexia, arthralgia, or leg cramps noted. Current medication regimen is as listed below. She denies any side effects of medication, and has been taking it regularly. Lab Results   Component Value Date    LDLCALC 63 01/18/2022    LDLDIRECT 123 (H) 01/03/2018       Patient has been working very hard on her diabetes. She had a vacation in [place] with a relative that also has diabetes and she says she learned a lot from her. Patient's A1c at her last appointment was 7.7, today it is 6.7. Patient was congratulated and encouraged to continue to work on this. She will continue metformin 1000 mg twice a day and Farxiga 10 mg a day. Her blood pressure is of uncertain control. She says her blood pressure at home this morning was 114 systolic. She has not checked it in a long time. Her blood pressure was elevated here today. When patient was seen at cardiology her blood pressure was 120/68. She will monitor her blood pressure daily and send me a [employer] message later this week with an update on her numbers. If need be I will add amlodipine 5 mg to her losartan HCTZ 100/12. 5. Her CAD is stable, she is due to see cardiology in March 2023. She continues on aspirin 81 mg and Lipitor 40 mg for her CAD and hyperlipidemia. She continues to work with pulmonology, last note was reviewed, her COPD stable. she says she is not needing to use her inhalers or she is breathing well. She continues with her follow-up CAT scans to follow her pulmonary nodules. She has aortic valve stenosis for which she follows cardiology.   Her depression is stable on Lexapro 20 mg we will continue this current dose    Current Outpatient Medications   Medication Sig Dispense Refill    metFORMIN (GLUCOPHAGE) 1000 MG tablet TAKE ONE TABLET BY MOUTH TWICE A DAY WITH MEALS 180 tablet 1    losartan-hydroCHLOROthiazide (HYZAAR) 100-12.5 MG per tablet 1 po daily 90 tablet 1    [START ON 8/23/2022] HYDROcodone-acetaminophen (NORCO) 5-325 MG per tablet Take 1 tablet by mouth as needed for Pain for up to 30 days. One tablet total a day. As needed for pain. 30 tablet 0    [START ON 7/25/2022] HYDROcodone-acetaminophen (NORCO) 5-325 MG per tablet Take 1 tablet by mouth as needed for Pain for up to 30 days. One tablet total a day. As needed for pain. 30 tablet 0    atorvastatin (LIPITOR) 40 MG tablet TAKE ONE TABLET BY MOUTH DAILY 90 tablet 2    ferrous sulfate (IRON 325) 325 (65 Fe) MG tablet Take 325 mg by mouth daily (with breakfast)      dicyclomine (BENTYL) 10 MG capsule Take 1 capsule by mouth 4 times daily as needed (cramping) 90 capsule 0    dapagliflozin (FARXIGA) 10 MG tablet Take 1 tablet by mouth every morning 90 tablet 1    Omega-3 Fatty Acids (FISH OIL) 1000 MG CAPS TAKE TWO CAPSULES BY MOUTH TWICE A  capsule 3    aspirin 81 MG EC tablet Take 81 mg by mouth daily      ipratropium (ATROVENT) 0.03 % nasal spray 2 sprays by Each Nostril route every 12 hours 30 mL 3    escitalopram (LEXAPRO) 20 MG tablet Take 1 tablet by mouth daily 90 tablet 1    budesonide-formoterol (SYMBICORT) 160-4.5 MCG/ACT AERO Inhale 2 puffs into the lungs 2 times daily 10.2 g 3    pantoprazole (PROTONIX) 40 MG tablet       ondansetron (ZOFRAN-ODT) 4 MG disintegrating tablet       valACYclovir (VALTREX) 1 g tablet Take 1 tablet by mouth every 12 hours 20 tablet 1    Budeson-Glycopyrrol-Formoterol (BREZTRI AEROSPHERE) 160-9-4.8 MCG/ACT AERO Inhale 2 puffs into the lungs 2 times daily 1 Inhaler 0    Collagen Hydrolysate POWD by Does not apply route       No current facility-administered medications for this visit.        Allergies   Allergen Reactions    Diovan [Valsartan] Anaphylaxis     Throat swelling      Bee Venom     Formaldehyde Itching    Iodine Hives     IVP dye    Lisinopril      Throat swelling  Angioedema      Niaspan [Niacin Er]      facial rash and itching    Amoxicillin Rash, Hives and Itching       Social History     Tobacco Use    Smoking status: Former     Packs/day: 1.00     Years: 50.00     Pack years: 50.00     Types: Cigarettes     Start date: 5     Quit date: 2017     Years since quittin.2    Smokeless tobacco: Never    Tobacco comments:     16- using chantix again as starting to sneak a few cigarettes. quit again with Chantix in . smoking again 19. \"quit\" 19, but still occ cigarette   Substance Use Topics    Alcohol use: No     Alcohol/week: 0.0 standard drinks       OBJECTIVE:   BP (!) 158/70   Pulse 68   Ht 4' 11\" (1.499 m)   Wt 165 lb 6.4 oz (75 kg)   SpO2 98%   BMI 33.41 kg/m²   NAD  Neck murmur radiates to neck or JVD  S1 and S2 normal, 3/6 murmurs,  no clicks, gallops or rubs. Regular rate and rhythm. Chest is clear; no wheezes or rales. No edema or JVD. Neuro alert, no CN or motor deficits  Psych nl mood, thought and judgement                EMR Dragon/transcription disclaimer:  Much of this encounter note is electronic transcription/translation of spoken language to printed texts. The electronic translation of spoken language may be erroneous, or at times, nonsensical words or phrases may be inadvertently transcribed.   Although I have reviewed the note for such errors, some may still exist.

## 2022-08-29 ENCOUNTER — HOSPITAL ENCOUNTER (OUTPATIENT)
Dept: CT IMAGING | Age: 82
Discharge: HOME | End: 2022-08-29
Payer: MEDICARE

## 2022-08-29 DIAGNOSIS — R91.1 LUNG NODULE: ICD-10-CM

## 2022-08-29 PROCEDURE — 71250 CT THORAX DX C-: CPT

## 2022-08-30 ENCOUNTER — OFFICE VISIT (OUTPATIENT)
Dept: PULMONOLOGY | Age: 82
End: 2022-08-30
Payer: MEDICARE

## 2022-08-30 VITALS
WEIGHT: 168 LBS | DIASTOLIC BLOOD PRESSURE: 76 MMHG | HEART RATE: 76 BPM | HEIGHT: 59 IN | TEMPERATURE: 98.4 F | BODY MASS INDEX: 33.87 KG/M2 | OXYGEN SATURATION: 94 % | RESPIRATION RATE: 20 BRPM | SYSTOLIC BLOOD PRESSURE: 139 MMHG

## 2022-08-30 DIAGNOSIS — Z87.891 HISTORY OF TOBACCO ABUSE: ICD-10-CM

## 2022-08-30 DIAGNOSIS — J32.9 SINUSITIS, UNSPECIFIED CHRONICITY, UNSPECIFIED LOCATION: ICD-10-CM

## 2022-08-30 DIAGNOSIS — R91.1 LUNG NODULE: Primary | ICD-10-CM

## 2022-08-30 PROCEDURE — G8427 DOCREV CUR MEDS BY ELIG CLIN: HCPCS | Performed by: INTERNAL MEDICINE

## 2022-08-30 PROCEDURE — 99214 OFFICE O/P EST MOD 30 MIN: CPT | Performed by: INTERNAL MEDICINE

## 2022-08-30 PROCEDURE — G8400 PT W/DXA NO RESULTS DOC: HCPCS | Performed by: INTERNAL MEDICINE

## 2022-08-30 PROCEDURE — 1123F ACP DISCUSS/DSCN MKR DOCD: CPT | Performed by: INTERNAL MEDICINE

## 2022-08-30 PROCEDURE — G8417 CALC BMI ABV UP PARAM F/U: HCPCS | Performed by: INTERNAL MEDICINE

## 2022-08-30 PROCEDURE — 1036F TOBACCO NON-USER: CPT | Performed by: INTERNAL MEDICINE

## 2022-08-30 PROCEDURE — 1090F PRES/ABSN URINE INCON ASSESS: CPT | Performed by: INTERNAL MEDICINE

## 2022-08-30 ASSESSMENT — ENCOUNTER SYMPTOMS
RHINORRHEA: 0
SHORTNESS OF BREATH: 1
GASTROINTESTINAL NEGATIVE: 1
SWOLLEN GLANDS: 0
VOMITING: 0
HEMOPTYSIS: 0
ABDOMINAL PAIN: 0
WHEEZING: 0
SORE THROAT: 0
ALLERGIC/IMMUNOLOGIC NEGATIVE: 1
ORTHOPNEA: 0
EYES NEGATIVE: 1
SPUTUM PRODUCTION: 0

## 2022-08-30 NOTE — PATIENT INSTRUCTIONS
ASSESSMENT/PLAN:  1. Lung nodule  2. Sinusitis, unspecified chronicity, unspecified location  3. History of tobacco abuse          Low-dose CT scan done 10/28/2021     Impression   New noncalcified nodule in the left lower lobe measuring 7 mm. There is mild   underlying emphysema. Severe coronary artery calcification       LUNG RADS:   Per ACR Lung-RADS Version 1.1       Category 4A,   This is the 7 mm nodule                Repeat CT scan done 5/17/2022    Impression   1. Unchanged solid subcentimeter bilateral pulmonary nodules including a 9 mm   solid left lower lobe pulmonary nodule. Recommend PET-CT for further   evaluation. Repeat CT scan done 8/30/2022  Impression   Chronic changes with stable left lower lobe 7 mm pulmonary nodule unchanged   from prior comparison 05/17/2022. Lung rads category 3 with recommended   follow-up within 6 months               At this point the options are as follows  1. Repeat CT scan in 6 months  2. PET CT scan    Will get repeat ct scan of 6 months        Sinusitis  Continue with:  Nasal atrovent 0.06% and 1-2 sprays, at night and up to 3-4 times if  Needed          Hypersomnolence        Insomnia  Could use melatonin up to 20 mg at night   however, will need to take everynight  Continue with   Melatonin at 10mg at night          COPD- not  PFT done 11/19/2021  Spirometry shows FVC to be 86%, FEV1 to be 82%, FEV1 to FVC ratio was  94%, FEF25%-75% was 59%. The patient did not have any significant  postbronchodilator improvement. Lung volume shows the total lung  capacity was normal.  The patient does have some air trapping. The  patient also has decrease in ERV. The patient's diffusion capacity when  adjusted for volume was normal.    No inhalers needed          RTC in 6 months    Remember to bring a list of pulmonary medications and any CPAP or BiPAP machines to your next appointment with the office.      Please keep all of your future appointments scheduled by [facility] office. Out of respect for other patients and providers, you may be asked to reschedule your appointment if you arrive later than your scheduled appointment time. Appointments cancelled less than 24hrs in advance will be considered a no show. Patients with three missed appointments within 1 year or four missed appointments within 2 years can be dismissed from the practice. Please be aware that our physicians are required to work in the Intensive Care Unit at [facility].  Your appointment may need to be rescheduled if they are designated to work during your appointment time. You may receive a survey regarding the care you received during your visit. Your input is valuable to us. We encourage you to complete and return your survey. We hope you will choose us in the future for your healthcare needs. Pt instructed of all future appointment dates & times, including radiology, labs, procedures & referrals. If procedures were scheduled preparation instructions provided. Instructions on future appointments with [facility] were given.

## 2022-08-30 NOTE — LETTER
[facility]  [address] [facility] [address] [address]  Phone: [phone]  Fax: [phone]    Eitan Kuhn MD        August 30, 2022     Patient: Nadiya Harris   YOB: 1940   Date of Visit: 8/30/2022 8/30/22        Nadiya Harris      I have seen this patient in the office today and wanted to communicate my findings and recommendations. Patient Instructions          ASSESSMENT/PLAN:  1. Lung nodule  2. Sinusitis, unspecified chronicity, unspecified location  3. History of tobacco abuse          Low-dose CT scan done 10/28/2021     Impression   New noncalcified nodule in the left lower lobe measuring 7 mm. There is mild   underlying emphysema. Severe coronary artery calcification       LUNG RADS:   Per ACR Lung-RADS Version 1.1       Category 4A,   This is the 7 mm nodule                Repeat CT scan done 5/17/2022    Impression   1. Unchanged solid subcentimeter bilateral pulmonary nodules including a 9 mm   solid left lower lobe pulmonary nodule. Recommend PET-CT for further   evaluation. Repeat CT scan done 8/30/2022  Impression   Chronic changes with stable left lower lobe 7 mm pulmonary nodule unchanged   from prior comparison 05/17/2022. Lung rads category 3 with recommended   follow-up within 6 months               At this point the options are as follows  1. Repeat CT scan in 6 months  2. PET CT scan    Will get repeat ct scan of 6 months        Sinusitis  Continue with:  Nasal atrovent 0.06% and 1-2 sprays, at night and up to 3-4 times if  Needed          Hypersomnolence        Insomnia  Could use melatonin up to 20 mg at night   however, will need to take everynight  Continue with   Melatonin at 10mg at night          COPD- not  PFT done 11/19/2021  Spirometry shows FVC to be 86%, FEV1 to be 82%, FEV1 to FVC ratio was  94%, FEF25%-75% was 59%.   The patient did not have any significant  postbronchodilator improvement. Lung volume shows the total lung  capacity was normal.  The patient does have some air trapping. The  patient also has decrease in ERV.   The patient's diffusion capacity when  adjusted for volume was normal.    No inhalers needed          RTC in 6 months                   Thank you for allowing me to assist in the care of the MD Gracie Casarez MD

## 2022-08-30 NOTE — PROGRESS NOTES
MA Communication:   The following orders are received by verbal communication from Weston Lopez MD    Orders include:  CT in 6 months scheduled 3/6/23       6 mo fu scheduled 3/7/23

## 2022-08-30 NOTE — PROGRESS NOTES
Claire Krause (:  1940) is a [de-identified] y.o. female,New patient, here for evaluation of the following chief complaint(s):  Pulmonary Nodule         ASSESSMENT/PLAN:  1. Lung nodule  2. Sinusitis, unspecified chronicity, unspecified location  3. History of tobacco abuse          Low-dose CT scan done 10/28/2021     Impression   New noncalcified nodule in the left lower lobe measuring 7 mm. There is mild   underlying emphysema. Severe coronary artery calcification       LUNG RADS:   Per ACR Lung-RADS Version 1.1       Category 4A,   This is the 7 mm nodule                Repeat CT scan done 2022    Impression   1. Unchanged solid subcentimeter bilateral pulmonary nodules including a 9 mm   solid left lower lobe pulmonary nodule. Recommend PET-CT for further   evaluation. Repeat CT scan done 2022  Impression   Chronic changes with stable left lower lobe 7 mm pulmonary nodule unchanged   from prior comparison 2022. Lung rads category 3 with recommended   follow-up within 6 months               At this point the options are as follows  1. Repeat CT scan in 6 months  2. PET CT scan    Will get repeat ct scan of 6 months        Sinusitis  Continue with:  Nasal atrovent 0.06% and 1-2 sprays, at night and up to 3-4 times if  Needed          Hypersomnolence        Insomnia  Could use melatonin up to 20 mg at night   however, will need to take everynight  Continue with   Melatonin at 10mg at night          COPD- not  PFT done 2021  Spirometry shows FVC to be 86%, FEV1 to be 82%, FEV1 to FVC ratio was  94%, FEF25%-75% was 59%. The patient did not have any significant  postbronchodilator improvement. Lung volume shows the total lung  capacity was normal.  The patient does have some air trapping. The  patient also has decrease in ERV. The patient's diffusion capacity when  adjusted for volume was normal.    No inhalers needed          RTC in 6 months        No follow-ups on file. Subjective   SUBJECTIVE/OBJECTIVE:  Consult from Dr. Ten Langford  Initially seen 11/4/2021  Reason for consult is lung nodule        Had screening Lung ct  Stop smoking in 2017      Fhx:  Lung cancer bro, mother  Throat cancer: father        No issues with breathing  Only when sleeping will get sob    +snore  No headache  Dry mouth, no  Sweating at night, at times    Sleepiness/fatigue  Sleep for many years  Insomnia at initiation    Naps in the day    Sinus running      Also having  Insomnia  And snoring  Sleepiness  +sweating      Having chest pain sharp at the left chest , occ  Reproducible  And hurts with deep breath and pressing    Shortness of Breath  This is a chronic problem. The current episode started more than 1 year ago. The problem occurs intermittently. The problem has been waxing and waning. Pertinent negatives include no abdominal pain, chest pain, claudication, coryza, ear pain, fever, headaches, hemoptysis, leg pain, leg swelling, neck pain, orthopnea, PND, rash, rhinorrhea, sore throat, sputum production, swollen glands, syncope, vomiting or wheezing. Review of Systems   Constitutional: Negative. Negative for fever. HENT: Negative. Negative for ear pain, rhinorrhea and sore throat. Eyes: Negative. Respiratory:  Positive for shortness of breath. Negative for hemoptysis, sputum production and wheezing. Cardiovascular: Negative. Negative for chest pain, orthopnea, claudication, leg swelling, syncope and PND. Gastrointestinal: Negative. Negative for abdominal pain and vomiting. Endocrine: Negative. Genitourinary: Negative. Musculoskeletal: Negative. Negative for neck pain. Skin: Negative. Negative for rash. Allergic/Immunologic: Negative. Neurological: Negative. Negative for headaches. Hematological: Negative. Psychiatric/Behavioral: Negative.         Vitals:    08/30/22 1348 08/30/22 1352   BP: (!) 141/81 139/76   Site: Left Upper Arm Right Upper Arm Content:  Thought content normal.         Judgment: Judgment normal.                An electronic signature was used to authenticate this note.    --Duy Stout MD

## 2022-09-18 DIAGNOSIS — F32.89 OTHER DEPRESSION: ICD-10-CM

## 2022-09-19 RX ORDER — VALACYCLOVIR HYDROCHLORIDE 1 G/1
TABLET, FILM COATED ORAL
Qty: 20 TABLET | Refills: 0 | Status: SHIPPED | OUTPATIENT
Start: 2022-09-19

## 2022-09-19 RX ORDER — ESCITALOPRAM OXALATE 20 MG/1
TABLET ORAL
Qty: 90 TABLET | Refills: 0 | Status: SHIPPED | OUTPATIENT
Start: 2022-09-19

## 2022-09-19 NOTE — TELEPHONE ENCOUNTER
Devora Waller [phone] (home)    is requesting refill(s) of medication [facility] 7[facility]18[facility] (pertaining to medication)   Last refill 10/18/21 (per medication requested)  Next office visit scheduled or attempted Yes  Date 10/11/22  If No, reason     Valacyclovir- 6/30/21

## 2022-09-26 ENCOUNTER — OFFICE VISIT (OUTPATIENT)
Dept: ORTHOPEDIC SURGERY | Age: 82
End: 2022-09-26
Payer: MEDICARE

## 2022-09-26 VITALS — WEIGHT: 168 LBS | BODY MASS INDEX: 33.87 KG/M2 | HEIGHT: 59 IN

## 2022-09-26 DIAGNOSIS — M51.36 DDD (DEGENERATIVE DISC DISEASE), LUMBAR: ICD-10-CM

## 2022-09-26 DIAGNOSIS — G89.4 CHRONIC PAIN SYNDROME: ICD-10-CM

## 2022-09-26 DIAGNOSIS — M54.16 LUMBAR RADICULITIS: ICD-10-CM

## 2022-09-26 PROCEDURE — 1123F ACP DISCUSS/DSCN MKR DOCD: CPT | Performed by: PHYSICIAN ASSISTANT

## 2022-09-26 PROCEDURE — G8417 CALC BMI ABV UP PARAM F/U: HCPCS | Performed by: PHYSICIAN ASSISTANT

## 2022-09-26 PROCEDURE — G8427 DOCREV CUR MEDS BY ELIG CLIN: HCPCS | Performed by: PHYSICIAN ASSISTANT

## 2022-09-26 PROCEDURE — 1090F PRES/ABSN URINE INCON ASSESS: CPT | Performed by: PHYSICIAN ASSISTANT

## 2022-09-26 PROCEDURE — G8400 PT W/DXA NO RESULTS DOC: HCPCS | Performed by: PHYSICIAN ASSISTANT

## 2022-09-26 PROCEDURE — 1036F TOBACCO NON-USER: CPT | Performed by: PHYSICIAN ASSISTANT

## 2022-09-26 PROCEDURE — 80305 DRUG TEST PRSMV DIR OPT OBS: CPT | Performed by: PHYSICIAN ASSISTANT

## 2022-09-26 PROCEDURE — 99214 OFFICE O/P EST MOD 30 MIN: CPT | Performed by: PHYSICIAN ASSISTANT

## 2022-09-26 RX ORDER — HYDROCODONE BITARTRATE AND ACETAMINOPHEN 5; 325 MG/1; MG/1
1 TABLET ORAL PRN
Qty: 30 TABLET | Refills: 0 | Status: SHIPPED | OUTPATIENT
Start: 2022-11-22 | End: 2022-10-11 | Stop reason: ALTCHOICE

## 2022-09-26 RX ORDER — HYDROCODONE BITARTRATE AND ACETAMINOPHEN 5; 325 MG/1; MG/1
1 TABLET ORAL PRN
Qty: 30 TABLET | Refills: 0 | Status: SHIPPED | OUTPATIENT
Start: 2022-10-24 | End: 2022-11-23

## 2022-09-26 RX ORDER — HYDROCODONE BITARTRATE AND ACETAMINOPHEN 5; 325 MG/1; MG/1
1 TABLET ORAL PRN
Qty: 30 TABLET | Refills: 0 | Status: SHIPPED
Start: 2022-09-26 | End: 2022-10-11 | Stop reason: SDUPTHER
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
## 2022-10-11 ENCOUNTER — OFFICE VISIT (OUTPATIENT)
Dept: FAMILY MEDICINE CLINIC | Age: 82
End: 2022-10-11
Payer: MEDICARE

## 2022-10-11 VITALS
HEART RATE: 73 BPM | BODY MASS INDEX: 33.63 KG/M2 | HEIGHT: 59 IN | DIASTOLIC BLOOD PRESSURE: 84 MMHG | OXYGEN SATURATION: 96 % | SYSTOLIC BLOOD PRESSURE: 144 MMHG | WEIGHT: 166.8 LBS

## 2022-10-11 DIAGNOSIS — F32.89 OTHER DEPRESSION: ICD-10-CM

## 2022-10-11 DIAGNOSIS — I25.10 CORONARY ARTERY DISEASE DUE TO CALCIFIED CORONARY LESION: ICD-10-CM

## 2022-10-11 DIAGNOSIS — J44.9 CHRONIC OBSTRUCTIVE PULMONARY DISEASE, UNSPECIFIED COPD TYPE (HCC): ICD-10-CM

## 2022-10-11 DIAGNOSIS — E78.5 DYSLIPIDEMIA WITH HIGH LDL AND LOW HDL: ICD-10-CM

## 2022-10-11 DIAGNOSIS — I35.0 AORTIC VALVE STENOSIS, ETIOLOGY OF CARDIAC VALVE DISEASE UNSPECIFIED: ICD-10-CM

## 2022-10-11 DIAGNOSIS — Z01.818 PREOP EXAMINATION: ICD-10-CM

## 2022-10-11 DIAGNOSIS — M79.671 RIGHT FOOT PAIN: Primary | ICD-10-CM

## 2022-10-11 DIAGNOSIS — I25.84 CORONARY ARTERY DISEASE DUE TO CALCIFIED CORONARY LESION: ICD-10-CM

## 2022-10-11 DIAGNOSIS — E11.59 TYPE 2 DIABETES MELLITUS WITH OTHER CIRCULATORY COMPLICATION, WITHOUT LONG-TERM CURRENT USE OF INSULIN (HCC): ICD-10-CM

## 2022-10-11 DIAGNOSIS — I10 ESSENTIAL HYPERTENSION: ICD-10-CM

## 2022-10-11 PROBLEM — E11.9 TYPE 2 DIABETES MELLITUS (HCC): Status: ACTIVE | Noted: 2022-10-11

## 2022-10-11 LAB — HBA1C MFR BLD: 6.6 %

## 2022-10-11 PROCEDURE — G8484 FLU IMMUNIZE NO ADMIN: HCPCS | Performed by: FAMILY MEDICINE

## 2022-10-11 PROCEDURE — 99214 OFFICE O/P EST MOD 30 MIN: CPT | Performed by: FAMILY MEDICINE

## 2022-10-11 PROCEDURE — 1090F PRES/ABSN URINE INCON ASSESS: CPT | Performed by: FAMILY MEDICINE

## 2022-10-11 PROCEDURE — G8417 CALC BMI ABV UP PARAM F/U: HCPCS | Performed by: FAMILY MEDICINE

## 2022-10-11 PROCEDURE — 93000 ELECTROCARDIOGRAM COMPLETE: CPT | Performed by: FAMILY MEDICINE

## 2022-10-11 PROCEDURE — 83036 HEMOGLOBIN GLYCOSYLATED A1C: CPT | Performed by: FAMILY MEDICINE

## 2022-10-11 PROCEDURE — 1123F ACP DISCUSS/DSCN MKR DOCD: CPT | Performed by: FAMILY MEDICINE

## 2022-10-11 PROCEDURE — 3044F HG A1C LEVEL LT 7.0%: CPT | Performed by: FAMILY MEDICINE

## 2022-10-11 PROCEDURE — G8400 PT W/DXA NO RESULTS DOC: HCPCS | Performed by: FAMILY MEDICINE

## 2022-10-11 PROCEDURE — G8427 DOCREV CUR MEDS BY ELIG CLIN: HCPCS | Performed by: FAMILY MEDICINE

## 2022-10-11 PROCEDURE — 1036F TOBACCO NON-USER: CPT | Performed by: FAMILY MEDICINE

## 2022-10-11 PROCEDURE — 3023F SPIROM DOC REV: CPT | Performed by: FAMILY MEDICINE

## 2022-10-11 NOTE — PROGRESS NOTES
right total hip replacement    OTHER SURGICAL HISTORY  2/12    L3-4 radioneurotomy    OTHER SURGICAL HISTORY Right 1/23/14 & 2/10/14    Closed reduction right hip    SHOULDER ARTHROPLASTY  12/11/2017    right    SHOULDER ARTHROSCOPY  9/5/12    right biceps tenodesis/debridemwnt and loose body removal    SHOULDER SURGERY Left 07/2017    RECONSTR TOTAL SHOULDER IMPLANT REPAIR ROTATOR CUFF,CHRONIC      SPINE SURGERY  1/10    spinal decompression    TONSILLECTOMY AND ADENOIDECTOMY  child     Current Medication  Current Outpatient Medications   Medication Sig Dispense Refill    [START ON 10/24/2022] HYDROcodone-acetaminophen (NORCO) 5-325 MG per tablet Take 1 tablet by mouth as needed for Pain for up to 30 days. One tablet total a day. As needed for pain.  30 tablet 0    valACYclovir (VALTREX) 1 g tablet TAKE ONE TABLET BY MOUTH EVERY 12 HOURS 20 tablet 0    escitalopram (LEXAPRO) 20 MG tablet TAKE ONE TABLET BY MOUTH DAILY 90 tablet 0    metFORMIN (GLUCOPHAGE) 1000 MG tablet TAKE ONE TABLET BY MOUTH TWICE A DAY WITH MEALS 180 tablet 1    losartan-hydroCHLOROthiazide (HYZAAR) 100-12.5 MG per tablet 1 po daily 90 tablet 1    atorvastatin (LIPITOR) 40 MG tablet TAKE ONE TABLET BY MOUTH DAILY 90 tablet 2    ferrous sulfate (IRON 325) 325 (65 Fe) MG tablet Take 325 mg by mouth daily (with breakfast)      dicyclomine (BENTYL) 10 MG capsule Take 1 capsule by mouth 4 times daily as needed (cramping) 90 capsule 0    dapagliflozin (FARXIGA) 10 MG tablet Take 1 tablet by mouth every morning 90 tablet 1    Omega-3 Fatty Acids (FISH OIL) 1000 MG CAPS TAKE TWO CAPSULES BY MOUTH TWICE A  capsule 3    aspirin 81 MG EC tablet Take 81 mg by mouth daily      ipratropium (ATROVENT) 0.03 % nasal spray 2 sprays by Each Nostril route every 12 hours 30 mL 3    budesonide-formoterol (SYMBICORT) 160-4.5 MCG/ACT AERO Inhale 2 puffs into the lungs 2 times daily 10.2 g 3    pantoprazole (PROTONIX) 40 MG tablet       ondansetron (ZOFRAN-ODT) 4 MG disintegrating tablet       Budeson-Glycopyrrol-Formoterol (BREZTRI AEROSPHERE) 160-9-4.8 MCG/ACT AERO Inhale 2 puffs into the lungs 2 times daily 1 Inhaler 0    Collagen Hydrolysate POWD by Does not apply route       No current facility-administered medications for this visit. Allergies:  Diovan [valsartan], Bee venom, Formaldehyde, Iodine, Lisinopril, Niaspan [niacin er], and Amoxicillin  History of allergic reaction to anesthesia:  No  Teeth: upper dentures    Social History:   Social History     Tobacco Use   Smoking Status Former    Packs/day: 1.00    Years: 50.00    Pack years: 50.00    Types: Cigarettes    Start date: 5    Quit date: 2017    Years since quittin.4   Smokeless Tobacco Never   Tobacco Comments    16- using chantix again as starting to sneak a few cigarettes. quit again with Chantix in . smoking again 17. \"quit\" 17, but still occ cigarette     The patient states she drinks 0 per week.     Family History:   Family History   Problem Relation Age of Onset    Cancer Mother         lung    Cancer Sister         lymphoma    Diabetes Maternal Grandmother     Hemochromatosis Maternal Grandfather        REVIEW OF SYSTEMS:    CONSTITUTIONAL:  negative  EYES:  negative  HEENT:  negative  RESPIRATORY:  negative  CARDIOVASCULAR:  negative  GASTROINTESTINAL:  negative  GENITOURINARY:  negative  INTEGUMENT/BREAST:  negative  HEMATOLOGIC/LYMPHATIC:  negative  ALLERGIC/IMMUNOLOGIC:  negative  ENDOCRINE:  negative  MUSCULOSKELETAL:  positive for  pain  NEUROLOGICAL:  negative    PHYSICAL EXAM:      BP (!) 144/84   Pulse 73   Ht 4' 11\" (1.499 m)   Wt 166 lb 12.8 oz (75.7 kg)   SpO2 96%   BMI 33.69 kg/m²       Eyes:  Lids and lashes normal,  extra ocular muscles intact, sclera clear, conjunctiva normal    Head/ENT:  Normocephalic, without obvious abnormality, atraumatic, sinuses nontender on palpation, external ears without lesions, oral pharynx with moist mucus membranes,     Neck:  Supple, symmetrical, trachea midline, no adenopathy, thyroid symmetric, not enlarged and no tenderness, skin normal    Heart:  Normal apical impulse, regular rate and rhythm, normal S1 and S2, no S3 or S4, and 3/6 murmur noted    Lungs:  No increased work of breathing, good air exchange, clear to auscultation bilaterally, no crackles or wheezing    Abdomen:   normal bowel sounds, soft, non-distended, non-tender, no masses palpated, no hepatosplenomegally    Extremities:  No clubbing, cyanosis, or edema        DATA:  EKG:  Date:  10/11/22  I have reviewed EKG with the following interpretation:  Impression:  NSR  CMP:    Lab Results   Component Value Date/Time     07/18/2022 11:50 AM    K 4.0 07/18/2022 11:50 AM     07/18/2022 11:50 AM    CO2 27 07/18/2022 11:50 AM    BUN 10 07/18/2022 11:50 AM    CREATININE 0.5 07/18/2022 11:50 AM    GFRAA >60 07/18/2022 11:50 AM    GFRAA >60 06/16/2010 10:42 AM    AGRATIO 1.7 07/18/2022 11:50 AM    LABGLOM >60 07/18/2022 11:50 AM    LABGLOM 93 10/03/2014 02:14 PM    GLUCOSE 111 07/18/2022 11:50 AM    GLUCOSE 88 07/22/2011 12:00 PM    PROT 7.1 07/18/2022 11:50 AM    PROT 7.6 02/08/2013 12:53 PM    LABALBU 4.5 07/18/2022 11:50 AM    CALCIUM 9.5 07/18/2022 11:50 AM    BILITOT 0.3 07/18/2022 11:50 AM    ALKPHOS 58 07/18/2022 11:50 AM    AST 13 07/18/2022 11:50 AM    ALT 14 07/18/2022 11:50 AM     HgBA1c:    Lab Results   Component Value Date/Time    LABA1C 6.7 07/18/2022 11:00 AM       ASSESSMENT AND PLAN:  Encounter Diagnoses   Name Primary?     Right foot pain Yes    Preop examination     Type 2 diabetes mellitus with other circulatory complication, without long-term current use of insulin ([facility] [address] last A1c was 6.7     Essential hypertension, patient has been off her blood pressure medicine for the last several days she will restart it today     Coronary artery disease due to calcified coronary lesion     Chronic obstructive pulmonary disease, unspecified COPD type ([facility] [address] stable patient has not needed her inhalers     Aortic valve stenosis, etiology of cardiac valve disease unspecified, stable     Dyslipidemia with high LDL and low HDL     Other depression        1. Patient is a [de-identified] y.o. female with above specified procedure planned on 10/14/22 with Dr. Clarence Alcantar at [facility]. They will  not require cardiology evaluation prior to procedure. 2. Stop NSAIDS medications 7-10 days prior to procedure. 3. cleared for surgery     Patient has some cognitive impairment.   Please be clear on instructions with her following her surgery

## 2022-10-12 ENCOUNTER — TELEPHONE (OUTPATIENT)
Dept: FAMILY MEDICINE CLINIC | Age: 82
End: 2022-10-12

## 2022-10-12 NOTE — TELEPHONE ENCOUNTER
Radha called needing Ekg faxed from pt visit on 10/11/22. Fax number [phone].  EKG has been faxed

## 2022-10-14 LAB — GLUCOSE BLD-MCNC: 124 MG/DL

## 2022-11-09 LAB — DIABETIC RETINOPATHY: NEGATIVE

## 2022-11-22 DIAGNOSIS — E11.69 TYPE 2 DIABETES MELLITUS WITH OTHER SPECIFIED COMPLICATION, WITHOUT LONG-TERM CURRENT USE OF INSULIN (HCC): ICD-10-CM

## 2022-11-23 RX ORDER — DAPAGLIFLOZIN 10 MG/1
TABLET, FILM COATED ORAL
Qty: 90 TABLET | Refills: 1 | Status: SHIPPED | OUTPATIENT
Start: 2022-11-23

## 2022-11-23 NOTE — TELEPHONE ENCOUNTER
Darcie Nunez is requesting refill(s) farxiga  Last OV 7/18/22 (pertaining to medication)  LR 4/18/22 (per medication requested)  Next office visit scheduled or attempted Yes   If no, reason:  12/27/22

## 2022-11-28 ENCOUNTER — OFFICE VISIT (OUTPATIENT)
Dept: ORTHOPEDIC SURGERY | Age: 82
End: 2022-11-28
Payer: MEDICARE

## 2022-11-28 VITALS — HEIGHT: 59 IN | BODY MASS INDEX: 33.47 KG/M2 | WEIGHT: 166 LBS

## 2022-11-28 DIAGNOSIS — M54.16 LUMBAR RADICULITIS: ICD-10-CM

## 2022-11-28 DIAGNOSIS — M51.36 DDD (DEGENERATIVE DISC DISEASE), LUMBAR: ICD-10-CM

## 2022-11-28 DIAGNOSIS — G89.4 CHRONIC PAIN SYNDROME: ICD-10-CM

## 2022-11-28 PROCEDURE — 99213 OFFICE O/P EST LOW 20 MIN: CPT | Performed by: PHYSICIAN ASSISTANT

## 2022-11-28 PROCEDURE — 1090F PRES/ABSN URINE INCON ASSESS: CPT | Performed by: PHYSICIAN ASSISTANT

## 2022-11-28 PROCEDURE — G8484 FLU IMMUNIZE NO ADMIN: HCPCS | Performed by: PHYSICIAN ASSISTANT

## 2022-11-28 PROCEDURE — G8417 CALC BMI ABV UP PARAM F/U: HCPCS | Performed by: PHYSICIAN ASSISTANT

## 2022-11-28 PROCEDURE — G8400 PT W/DXA NO RESULTS DOC: HCPCS | Performed by: PHYSICIAN ASSISTANT

## 2022-11-28 PROCEDURE — G8427 DOCREV CUR MEDS BY ELIG CLIN: HCPCS | Performed by: PHYSICIAN ASSISTANT

## 2022-11-28 PROCEDURE — 1036F TOBACCO NON-USER: CPT | Performed by: PHYSICIAN ASSISTANT

## 2022-11-28 PROCEDURE — 1123F ACP DISCUSS/DSCN MKR DOCD: CPT | Performed by: PHYSICIAN ASSISTANT

## 2022-11-28 RX ORDER — HYDROCODONE BITARTRATE AND ACETAMINOPHEN 5; 325 MG/1; MG/1
1 TABLET ORAL PRN
Qty: 30 TABLET | Refills: 0 | Status: SHIPPED | OUTPATIENT
Start: 2023-01-24 | End: 2023-02-23

## 2022-11-28 RX ORDER — HYDROCODONE BITARTRATE AND ACETAMINOPHEN 5; 325 MG/1; MG/1
1 TABLET ORAL PRN
Qty: 30 TABLET | Refills: 0 | Status: CANCELLED | OUTPATIENT
Start: 2022-11-30 | End: 2022-12-30

## 2022-11-28 RX ORDER — HYDROCODONE BITARTRATE AND ACETAMINOPHEN 5; 325 MG/1; MG/1
1 TABLET ORAL PRN
Qty: 30 TABLET | Refills: 0 | Status: SHIPPED | OUTPATIENT
Start: 2022-12-26 | End: 2023-01-25
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
## 2022-12-27 ENCOUNTER — OFFICE VISIT (OUTPATIENT)
Dept: FAMILY MEDICINE CLINIC | Age: 82
End: 2022-12-27
Payer: MEDICARE

## 2022-12-27 VITALS
DIASTOLIC BLOOD PRESSURE: 62 MMHG | HEIGHT: 59 IN | SYSTOLIC BLOOD PRESSURE: 140 MMHG | HEART RATE: 68 BPM | WEIGHT: 166 LBS | OXYGEN SATURATION: 98 % | BODY MASS INDEX: 33.47 KG/M2

## 2022-12-27 DIAGNOSIS — I10 ESSENTIAL HYPERTENSION: ICD-10-CM

## 2022-12-27 DIAGNOSIS — F32.89 OTHER DEPRESSION: ICD-10-CM

## 2022-12-27 DIAGNOSIS — E78.5 DYSLIPIDEMIA WITH HIGH LDL AND LOW HDL: ICD-10-CM

## 2022-12-27 DIAGNOSIS — I25.84 CORONARY ARTERY DISEASE DUE TO CALCIFIED CORONARY LESION: ICD-10-CM

## 2022-12-27 DIAGNOSIS — R42 VERTIGO: ICD-10-CM

## 2022-12-27 DIAGNOSIS — I35.0 AORTIC VALVE STENOSIS, ETIOLOGY OF CARDIAC VALVE DISEASE UNSPECIFIED: ICD-10-CM

## 2022-12-27 DIAGNOSIS — M51.36 DDD (DEGENERATIVE DISC DISEASE), LUMBAR: ICD-10-CM

## 2022-12-27 DIAGNOSIS — I25.10 CORONARY ARTERY DISEASE DUE TO CALCIFIED CORONARY LESION: ICD-10-CM

## 2022-12-27 DIAGNOSIS — E11.69 TYPE 2 DIABETES MELLITUS WITH OTHER SPECIFIED COMPLICATION, WITHOUT LONG-TERM CURRENT USE OF INSULIN (HCC): ICD-10-CM

## 2022-12-27 DIAGNOSIS — J44.9 CHRONIC OBSTRUCTIVE PULMONARY DISEASE, UNSPECIFIED COPD TYPE (HCC): ICD-10-CM

## 2022-12-27 LAB
A/G RATIO: 1.7 (ref 1.1–2.2)
ALBUMIN SERPL-MCNC: 4.5 G/DL (ref 3.4–5)
ALP BLD-CCNC: 60 U/L (ref 40–129)
ALT SERPL-CCNC: 13 U/L (ref 10–40)
ANION GAP SERPL CALCULATED.3IONS-SCNC: 13 MMOL/L (ref 3–16)
AST SERPL-CCNC: 13 U/L (ref 15–37)
BILIRUB SERPL-MCNC: 0.4 MG/DL (ref 0–1)
BUN BLDV-MCNC: 16 MG/DL (ref 7–20)
CALCIUM SERPL-MCNC: 10.1 MG/DL (ref 8.3–10.6)
CHLORIDE BLD-SCNC: 96 MMOL/L (ref 99–110)
CHOLESTEROL, TOTAL: 144 MG/DL (ref 0–199)
CO2: 26 MMOL/L (ref 21–32)
CREAT SERPL-MCNC: <0.5 MG/DL (ref 0.6–1.2)
GFR SERPL CREATININE-BSD FRML MDRD: >60 ML/MIN/{1.73_M2}
GLUCOSE BLD-MCNC: 89 MG/DL (ref 70–99)
HDLC SERPL-MCNC: 36 MG/DL (ref 40–60)
LDL CHOLESTEROL CALCULATED: 59 MG/DL
POTASSIUM SERPL-SCNC: 4.4 MMOL/L (ref 3.5–5.1)
SODIUM BLD-SCNC: 135 MMOL/L (ref 136–145)
TOTAL PROTEIN: 7.2 G/DL (ref 6.4–8.2)
TRIGL SERPL-MCNC: 246 MG/DL (ref 0–150)
VLDLC SERPL CALC-MCNC: 49 MG/DL

## 2022-12-27 PROCEDURE — 1036F TOBACCO NON-USER: CPT | Performed by: FAMILY MEDICINE

## 2022-12-27 PROCEDURE — 3044F HG A1C LEVEL LT 7.0%: CPT | Performed by: FAMILY MEDICINE

## 2022-12-27 PROCEDURE — 99215 OFFICE O/P EST HI 40 MIN: CPT | Performed by: FAMILY MEDICINE

## 2022-12-27 PROCEDURE — G8484 FLU IMMUNIZE NO ADMIN: HCPCS | Performed by: FAMILY MEDICINE

## 2022-12-27 PROCEDURE — G8417 CALC BMI ABV UP PARAM F/U: HCPCS | Performed by: FAMILY MEDICINE

## 2022-12-27 PROCEDURE — 3078F DIAST BP <80 MM HG: CPT | Performed by: FAMILY MEDICINE

## 2022-12-27 PROCEDURE — 3023F SPIROM DOC REV: CPT | Performed by: FAMILY MEDICINE

## 2022-12-27 PROCEDURE — G8427 DOCREV CUR MEDS BY ELIG CLIN: HCPCS | Performed by: FAMILY MEDICINE

## 2022-12-27 PROCEDURE — 1090F PRES/ABSN URINE INCON ASSESS: CPT | Performed by: FAMILY MEDICINE

## 2022-12-27 PROCEDURE — 36415 COLL VENOUS BLD VENIPUNCTURE: CPT | Performed by: FAMILY MEDICINE

## 2022-12-27 PROCEDURE — 3074F SYST BP LT 130 MM HG: CPT | Performed by: FAMILY MEDICINE

## 2022-12-27 PROCEDURE — G8400 PT W/DXA NO RESULTS DOC: HCPCS | Performed by: FAMILY MEDICINE

## 2022-12-27 PROCEDURE — 1123F ACP DISCUSS/DSCN MKR DOCD: CPT | Performed by: FAMILY MEDICINE

## 2022-12-27 RX ORDER — ESCITALOPRAM OXALATE 20 MG/1
TABLET ORAL
Qty: 90 TABLET | Refills: 1 | Status: SHIPPED | OUTPATIENT
Start: 2022-12-27

## 2022-12-27 NOTE — PROGRESS NOTES
Rina Coello presents for   Chief Complaint   Patient presents with    Hypertension     Pt states that she is here for a 3 month f/u, is fasting for bw        ASSESSMENT:   Diagnosis Orders   1. Type 2 diabetes mellitus with other specified complication, without long-term current use of insulin (HCC) too soon to check A1c today. Patient will continue on Farxiga 10 mg and metformin 1000 mg twice a day. Return in 3 months we will do an A1c at that time       2. Essential hypertension, uncertain control, patient will continue to monitor blood pressure at home. For now she will continue on losartan HCTZ 100/12.5 Comprehensive Metabolic Panel      3. Coronary artery disease due to calcified coronary lesion, continue cardiology follow-up she is due in March 2023. For now continue 81 mg aspirin and Lipitor 40 mg       4. Chronic obstructive pulmonary disease, unspecified COPD type ([address] stable she is seen in pulmonary to follow-up on her lung nodule. Currently she is not taking either of her inhalers Breztri or Symbicort       5. Aortic valve stenosis, etiology of cardiac valve disease unspecified, stable continue follow-up with cardiology       6. Dyslipidemia with high LDL and low HDL, labs are pending we will see how Lipitor 40 mg is working for Lipid Panel      7. Other depression, stable on Lexapro 20 mg escitalopram (LEXAPRO) 20 MG tablet      8. DDD (degenerative disc disease), lumbar, continue orthopedic follow-up       9. Vertigo, new issue she was given a referral to physical therapy for Epley maneuvers and physical therapy to prevent falls, she will continue with her meclizine as needed.   Physical therapy referral was preferred over ENT because I think she needs to physical therapy to prevent falls [facility]:  1)  Medication: continue current medication regimen unchanged, medications are working and tolerated, continue as listed  2)  Recheck in 3 months, sooner should new symptoms or problems arise. 3) LLR      I spent a total of 40* minutes with the patient, reviewing previous notes, consults, test results, imaging ,discussing chronic and acute conditions      SUBJECTIVE:  Antonio Louis is a [de-identified] y.o. female who presents for evaluation of diabetes, CAD, COPD, aortic valve stenosis, depression, hypertension and hyperlipidemia. She indicates that she is feeling well and denies any symptoms referable to her elevated blood pressure. Specifically denies chest pain, palpitations, dyspnea, orthopnea, PND or peripheral edema or neuro sx. No anorexia,  or leg cramps noted. Current medication regimen is as listed below. She denies any side effects of medication, and has been taking it regularly. Lab Results   Component Value Date    LDLCALC 57 07/18/2022    LDLDIRECT 123 (H) 01/03/2018       Patient just returned from a 1 week trip to [place] to visit her sister for the holidays. She says that she was not mindful of her diet during that trip. Patient's last A1c was 6.6 in October, it is too soon to recheck this again according to Medicare guidelines. She will continue on Farxiga 10 mg and metformin 1000 mg twice a day. She states compliance with the medications. Her heart disease has been stable. She will be seeing cardiology in follow-up for her CAD and/or aortic valve stenosis in March 2023. For now she will continue on low-dose aspirin and Lipitor 40 mg. Patient COPD is stable. She is seen in pulmonary clinic for following up of her lung nodules as well. Her breathing has not been bad she has not needed to take her inhalers. Patient's depression is stable on Lexapro 20 mg no need to make any changes. Patient's blood pressure is of uncertain control. She states she checks it at home and her numbers are in the 130s. Her numbers were higher here in the office. For now she will continue on losartan HCTZ 10/12.5.   She will monitor her blood pressure at home and let us know if her numbers are consistently above 068 systolic. Patient is fasting for blood work today we will see how Lipitor 40 mg is working for her. Patient states that lately she has been more dizzy. She defines this is when she first wakes up she is got a hold onto the wall because she is spinning. Also when she turns her head or bends over she will have spinning as well. She has meclizine from devious issues. She is currently not walking with a cane. We did discuss the importance of fall prevention. We will send her to physical therapy for Epley maneuvers and also for physical therapy to prevent falls. Current Outpatient Medications   Medication Sig Dispense Refill    escitalopram (LEXAPRO) 20 MG tablet TAKE ONE TABLET BY MOUTH DAILY 90 tablet 1    [START ON 1/24/2023] HYDROcodone-acetaminophen (NORCO) 5-325 MG per tablet Take 1 tablet by mouth as needed for Pain for up to 30 days. Max one tablet a day. As needed for pain.  30 tablet 0    FARXIGA 10 MG tablet TAKE ONE TABLET BY MOUTH EVERY MORNING 90 tablet 1    valACYclovir (VALTREX) 1 g tablet TAKE ONE TABLET BY MOUTH EVERY 12 HOURS 20 tablet 0    metFORMIN (GLUCOPHAGE) 1000 MG tablet TAKE ONE TABLET BY MOUTH TWICE A DAY WITH MEALS 180 tablet 1    losartan-hydroCHLOROthiazide (HYZAAR) 100-12.5 MG per tablet 1 po daily 90 tablet 1    atorvastatin (LIPITOR) 40 MG tablet TAKE ONE TABLET BY MOUTH DAILY 90 tablet 2    ferrous sulfate (IRON 325) 325 (65 Fe) MG tablet Take 325 mg by mouth daily (with breakfast)      dicyclomine (BENTYL) 10 MG capsule Take 1 capsule by mouth 4 times daily as needed (cramping) 90 capsule 0    Omega-3 Fatty Acids (FISH OIL) 1000 MG CAPS TAKE TWO CAPSULES BY MOUTH TWICE A  capsule 3    aspirin 81 MG EC tablet Take 81 mg by mouth daily      ipratropium (ATROVENT) 0.03 % nasal spray 2 sprays by Each Nostril route every 12 hours 30 mL 3    budesonide-formoterol (SYMBICORT) 160-4.5 MCG/ACT AERO Inhale 2 puffs into the lungs 2 times daily 10.2 g 3    pantoprazole (PROTONIX) 40 MG tablet       ondansetron (ZOFRAN-ODT) 4 MG disintegrating tablet       Collagen Hydrolysate POWD by Does not apply route      Budeson-Glycopyrrol-Formoterol (BREZTRI AEROSPHERE) 160-9-4.8 MCG/ACT AERO Inhale 2 puffs into the lungs 2 times daily 1 Inhaler 0     No current facility-administered medications for this visit. Allergies   Allergen Reactions    Diovan [Valsartan] Anaphylaxis     Throat swelling      Bee Venom     Formaldehyde Itching    Iodine Hives     IVP dye    Lisinopril      Throat swelling  Angioedema      Niaspan [Niacin Er]      facial rash and itching    Amoxicillin Rash, Hives and Itching       Social History     Tobacco Use    Smoking status: Former     Packs/day: 1.00     Years: 50.00     Pack years: 50.00     Types: Cigarettes     Start date: 7     Quit date: 2017     Years since quittin.6    Smokeless tobacco: Never    Tobacco comments:     16- using chantix again as starting to sneak a few cigarettes. quit again with Chantix in . smoking again 19. \"quit\" 19, but still occ cigarette   Substance Use Topics    Alcohol use: No     Alcohol/week: 0.0 standard drinks       OBJECTIVE:   BP (!) 140/62   Pulse 68   Ht 4' 11\" (1.499 m)   Wt 166 lb (75.3 kg)   SpO2 98%   BMI 33.53 kg/m²   NAD  Neck no bruit or JVD  S1 and S2 normal, 2/6 murmurs,  no clicks, gallops or rubs. Regular rate and rhythm. Chest is clear; no wheezes or rales. No edema or JVD. Neuro alert, no CN or motor deficits  Psych nl mood, thought and judgement                EMR Dragon/transcription disclaimer:  Much of this encounter note is electronic transcription/translation of spoken language to printed texts. The electronic translation of spoken language may be erroneous, or at times, nonsensical words or phrases may be inadvertently transcribed.   Although I have reviewed the note for such errors, some may still exist.

## 2023-01-06 ENCOUNTER — HOSPITAL ENCOUNTER (OUTPATIENT)
Dept: PHYSICAL THERAPY | Age: 83
Setting detail: THERAPIES SERIES
End: 2023-01-06
Payer: MEDICARE

## 2023-01-13 ENCOUNTER — HOSPITAL ENCOUNTER (OUTPATIENT)
Dept: PHYSICAL THERAPY | Age: 83
Setting detail: THERAPIES SERIES
Discharge: HOME | End: 2023-01-13
Payer: MEDICARE

## 2023-01-13 PROCEDURE — 97162 PT EVAL MOD COMPLEX 30 MIN: CPT

## 2023-01-13 PROCEDURE — 97112 NEUROMUSCULAR REEDUCATION: CPT
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
## 2023-01-13 NOTE — FLOWSHEET NOTE
[facility] and Therapy, [facility], [facility], [address] [place] [address]  Phone: [phone]  Fax [phone]    Physical Therapy Daily Treatment Note    Date:  2023    Patient Name:  Louise Lpoez    :  1940  MRN: [id number]  Restrictions/Precautions:    Medical/Treatment Diagnosis Information:   R42 - vertigo  imbalance  Insurance/Certification information:   Medicare and [employer]  Physician Information:   Andria Roberto MD  Plan of care signed (Y/N):  N  Visit# / total visits:       G-Code (if applicable):          [address] Medicare Cap (if applicable):  533 = total amount used, updated 2023    Time in:   2:35      Timed Treatment: 10 Total Treatment Time:  40  Time out: 3:15  ________________________________________________________________________________________    Pain Level:    /10  SUBJECTIVE:  See initial eval    OBJECTIVE:     Exercise/Equipment Resistance/Repetitions Other comments          Positional testing first thing  NV           VORx1 NV if positional testing negative    Two targets NV    Remembered target NV    Balance on airex NV    rockerboard NV                                                                                      Other Therapeutic Activities:  Education regarding POC including demonstration with models of anatomy and physiology in order to maximize benefits of treatment; total neuro re-ed 10 minutes    Manual Treatments:         Modalities:      Test/Measurements:  See initial eval       ASSESSMENT: See initial eval        Treatment/Activity Tolerance:   [x]Patient tolerated treatment well [] Patient limited by fatique  []Patient limited by pain [] Patient limited by other medical complications  [] Other:        GOALS: Goals established 23   Patient stated goal: become more steady on her feet  [] Progressing: [] Met: [] Not Met: [] Adjusted     Therapist goals for Patient:  Short Term Goals:  To be achieved in: 2 weeks  Independent in HEP and progression per patient tolerance, in order to prevent re-injury. [] Progressing: [] Met: [] Not Met: [] Adjusted  Patient will have a decrease in dizziness/imbalance/symptoms to facilitate improvement in movement, function, balance, and ADLS as indicated by Functional Deficits. [] Progressing: [] Met: [] Not Met: [] Adjusted     Long Term Goals: To be achieved in:  4 weeks  Disability index score of 20% or less for the DHI/Alfred/FGA/DGI to assist with reaching prior level of function  [] Progressing: [] Met: [] Not Met: [] Adjusted  Patient will demonstrate increased cervical AROM to WNL, joint mobility WNL to allow for proper joint functioning as indicated by Functional Deficits. [] Progressing: [] Met: [] Not Met: [] Adjusted  Patient will demonstrate negative oculomotor special testing/positional testing as indicated by Functional Deficits. [] Progressing: [] Met: [] Not Met: [] Adjusted  Patient will return to functional activities including looking up and walking across the room without increased symptoms or restriction.   [] Progressing: [] Met: [] Not Met: [] Adjusted  Patient will achieve 120 seconds on Modified CTSIB to indicate improved vestibular input for balance (patient specific functional goal)  [] Progressing: [] Met: [] Not Met: [] Adjusted       Plan: [] Continue per plan of care [] Alter current plan (see comments)   [x] Plan of care initiated [] Hold pending MD visit [] Discharge      Plan for Next Session:  positional testing, VRT    Re-Certification Due Date:         Signature:  Tim Ni PT

## 2023-01-17 ENCOUNTER — HOSPITAL ENCOUNTER (OUTPATIENT)
Dept: PHYSICAL THERAPY | Age: 83
Setting detail: THERAPIES SERIES
Discharge: HOME | End: 2023-01-17
Payer: MEDICARE

## 2023-01-17 PROCEDURE — 97112 NEUROMUSCULAR REEDUCATION: CPT

## 2023-01-17 PROCEDURE — 95992 CANALITH REPOSITIONING PROC: CPT

## 2023-01-17 NOTE — FLOWSHEET NOTE
[facility], [facility], [address], [address]  Phone: [phone]  Fax [phone]    Physical Therapy Daily Treatment Note    Date:  2023    Patient Name:  Juan Jose Sheikh    :  1940  MRN: [id number]  Restrictions/Precautions:    Medical/Treatment Diagnosis Information:   R42 - vertigo  imbalance  Insurance/Certification information:   Medicare and [place]  Physician Information:   Hallie Hernández MD  Plan of care signed (Y/N):  N  Visit# / total visits:       G-Code (if applicable):          [address] 44 Medicare Cap (if applicable):  391 = total amount used, updated 2023    Time in:   1:15      Timed Treatment: 15   Total Treatment Time:  45  Time out: 2:00  ________________________________________________________________________________________    Pain Level:    /10  SUBJECTIVE:  Pt reports that her vertigo isn't too bad right now, but always there. Worse in the morning. OBJECTIVE:     Exercise/Equipment Resistance/Repetitions Other comments          Positional testing first thing  See below    Canalith repositioning for L PSC canalithiasis X3 rounds Symptomatic only in position 1 which did not change with deeper extension on round 3. Suspect patient may need Semont versus modified Epley.     VORx1 NV if positional testing negative    Two targets NV    Remembered target NV    Balance on airex NV    rockerboard NV                                                                                      Other Therapeutic Activities:  Education regarding POC including demonstration with models of anatomy and physiology in order to maximize benefits of treatment; total neuro re-ed 10 minutes    Manual Treatments:         Modalities:      Test/Measurements:      Positional Testing   R Hallpike-Ursula maneuver:               Nystagmus:     [] Yes             [] No               [] Duration:     [] Direction:                  Vertigo:            [] Yes             [] No               [] Duration:   L Hallpike-Ursula maneuver:              Nystagmus:     [x] Yes             [] No               [x] Duration:   20 seconds                                        [x] Direction:    UB torsional              Vertigo:            [x] Yes             [] No               [x] Duration:  20 seconds   Supine roll head right:              Nystagmus:     [] Yes             [] No               [] Duration:                                           [] Direction:                  Vertigo:            [] Yes             [] No               [] Duration:   Supine roll head left:              Nystagmus:     [] Yes             [] No               [] Duration:                                           [] Direction:                  Vertigo:            [] Yes             [] No               [] Duration:        ASSESSMENT:  Pt was positive for L PSC BPPV that did not clear with three rounds of canalith repositioning. Pt may need use of Semont maneuver to help clear her BPPV. Re-assess NV including assessment of left side which was deferred today.     Treatment/Activity Tolerance:   [x]Patient tolerated treatment well [] Patient limited by fatique  []Patient limited by pain [] Patient limited by other medical complications  [] Other:        GOALS: Goals established 1/13/23   Patient stated goal: become more steady on her feet  [] Progressing: [] Met: [] Not Met: [] Adjusted     Therapist goals for Patient:  Short Term Goals: To be achieved in: 2 weeks  Independent in HEP and progression per patient tolerance, in order to prevent re-injury.   [] Progressing: [] Met: [] Not Met: [] Adjusted  Patient will have a decrease in dizziness/imbalance/symptoms to facilitate improvement in movement, function, balance, and ADLS as indicated by Functional Deficits.   [] Progressing: [] Met: [] Not Met: [] Adjusted     Long Term Goals: To be achieved  in:  4 weeks  Disability index score of 20% or less for the DHI/Alfred/FGA/DGI to assist with reaching prior level of function  [] Progressing: [] Met: [] Not Met: [] Adjusted  Patient will demonstrate increased cervical AROM to WNL, joint mobility WNL to allow for proper joint functioning as indicated by Functional Deficits. [] Progressing: [] Met: [] Not Met: [] Adjusted  Patient will demonstrate negative oculomotor special testing/positional testing as indicated by Functional Deficits. [] Progressing: [] Met: [] Not Met: [] Adjusted  Patient will return to functional activities including looking up and walking across the room without increased symptoms or restriction.   [] Progressing: [] Met: [] Not Met: [] Adjusted  Patient will achieve 120 seconds on Modified CTSIB to indicate improved vestibular input for balance (patient specific functional goal)  [] Progressing: [] Met: [] Not Met: [] Adjusted       Plan: [x] Continue per plan of care [] Alter current plan (see comments)   [] Plan of care initiated [] Hold pending MD visit [] Discharge      Plan for Next Session:  positional testing, VRT    Re-Certification Due Date:         Signature:  Ting Oconnor, PT

## 2023-01-20 ENCOUNTER — HOSPITAL ENCOUNTER (OUTPATIENT)
Dept: PHYSICAL THERAPY | Age: 83
Setting detail: THERAPIES SERIES
Discharge: HOME | End: 2023-01-20
Payer: MEDICARE

## 2023-01-20 PROCEDURE — 95992 CANALITH REPOSITIONING PROC: CPT

## 2023-01-20 PROCEDURE — 97112 NEUROMUSCULAR REEDUCATION: CPT

## 2023-01-20 NOTE — FLOWSHEET NOTE
[facility], [facility], [address] [place] [address]  Phone: [phone]  Fax [phone]    Physical Therapy Daily Treatment Note    Date:  2023    Patient Name:  Aidee Church    :  1940  MRN: [id number]  Restrictions/Precautions:    Medical/Treatment Diagnosis Information:   R42 - vertigo  imbalance  Insurance/Certification information:   Medicare and [place]  Physician Information:   Kunal Angulo MD  Plan of care signed (Y/N):  Y  Visit# / total visits:  3/8     G-Code (if applicable):          [address] 44 Medicare Cap (if applicable):  882 = total amount used, updated 2023    Time in:   2:30    Timed Treatment: 15   Total Treatment Time:  35  Time out: 3:05  ________________________________________________________________________________________    Pain Level:    /10  SUBJECTIVE:  Pt reports that her vertigo wasn't too bad after last visit, except for yesterday. Yesterday was a bad vertigo day, fell over upstairs and felt like she was being thrown all over yesterday. Tried to get something down from the top shelf at the grocery and the whole world began spinning. OBJECTIVE:     Exercise/Equipment Resistance/Repetitions Other comments          Positional testing first thing  See below    Canalith repositioning for L PSC canalithiasis Semont maneuver for L PSC canalithiasis X2 rounds Improved nystagmus duration and intensity on second round. Asymptomatic upon sitting.              VORx1 NV if positional testing negative    Two targets NV    Remembered target NV    Balance on airex NV    rockerboard NV                                                                                      Other Therapeutic Activities:  Education regarding POC including demonstration with models of anatomy and physiology in order to maximize benefits of treatment; total neuro re-ed 10 minutes    Manual Treatments:         Modalities: Test/Measurements:      Positional Testing   R Hallpike-Ursula maneuver:               Nystagmus:     [] Yes             [x] No               [] Duration:                                           [] Direction:                  Vertigo:            [] Yes             [x] No               [] Duration:   L Hallpike-Ursula maneuver:              Nystagmus:     [x] Yes             [] No               [x] Duration:   25 seconds                                        [x] Direction:    UB torsional              Vertigo:            [x] Yes             [] No               [x] Duration:  25 seconds   Supine roll head right:              Nystagmus:     [] Yes             [x] No               [] Duration:                                           [] Direction:                  Vertigo:            [] Yes             [x] No               [] Duration:   Supine roll head left:              Nystagmus:     [] Yes             [x] No               [] Duration:                                           [] Direction:                  Vertigo:            [] Yes             [x] No               [] Duration:        ASSESSMENT:  Pt was positive for L PSC BPPV that improved following the first round of Semont both in duration and intensity, but not eliminated. Deferred additional treatment due to the snow-globe effect that occurs after two maneuvers. She was negative on the R. Re-assess NV. Treatment/Activity Tolerance:   [x]Patient tolerated treatment well [] Patient limited by fatique  []Patient limited by pain [] Patient limited by other medical complications  [] Other:        GOALS: Goals established 1/13/23   Patient stated goal: become more steady on her feet  [] Progressing: [] Met: [] Not Met: [] Adjusted     Therapist goals for Patient:  Short Term Goals: To be achieved in: 2 weeks  Independent in HEP and progression per patient tolerance, in order to prevent re-injury.    [] Progressing: [] Met: [] Not Met: [] Adjusted  Patient will have a decrease in dizziness/imbalance/symptoms to facilitate improvement in movement, function, balance, and ADLS as indicated by Functional Deficits. [] Progressing: [] Met: [] Not Met: [] Adjusted     Long Term Goals: To be achieved in:  4 weeks  Disability index score of 20% or less for the DHI/Alfred/FGA/DGI to assist with reaching prior level of function  [] Progressing: [] Met: [] Not Met: [] Adjusted  Patient will demonstrate increased cervical AROM to WNL, joint mobility WNL to allow for proper joint functioning as indicated by Functional Deficits. [] Progressing: [] Met: [] Not Met: [] Adjusted  Patient will demonstrate negative oculomotor special testing/positional testing as indicated by Functional Deficits. [] Progressing: [] Met: [] Not Met: [] Adjusted  Patient will return to functional activities including looking up and walking across the room without increased symptoms or restriction.   [] Progressing: [] Met: [] Not Met: [] Adjusted  Patient will achieve 120 seconds on Modified CTSIB to indicate improved vestibular input for balance (patient specific functional goal)  [] Progressing: [] Met: [] Not Met: [] Adjusted       Plan: [x] Continue per plan of care [] Alter current plan (see comments)   [] Plan of care initiated [] Hold pending MD visit [] Discharge      Plan for Next Session:  positional testing, VRT    Re-Certification Due Date:         Signature:  Inez Melgar PT

## 2023-01-24 ENCOUNTER — HOSPITAL ENCOUNTER (OUTPATIENT)
Dept: PHYSICAL THERAPY | Age: 83
Setting detail: THERAPIES SERIES
Discharge: HOME | End: 2023-01-24
Payer: MEDICARE

## 2023-01-24 NOTE — PROGRESS NOTES
Physical Therapy  Cancellation/No-show Note  Patient Name:  Tree Pineda  :  1940   Date:  2023  Cancels to date: 1  No-shows to date: 0    For today's appointment patient:  [x] Cancelled  [] Rescheduled appointment  [] No-show     Reason given by patient:  [] Patient ill  [] Conflicting appointment  [] No transportation    [] Conflict with work  [x] No reason given  [] Other:     Comments:      Electronically signed by:  Petra Lu PT

## 2023-02-01 ENCOUNTER — HOSPITAL ENCOUNTER (OUTPATIENT)
Dept: PHYSICAL THERAPY | Age: 83
Setting detail: THERAPIES SERIES
Discharge: HOME | End: 2023-02-01
Payer: MEDICARE

## 2023-02-01 PROCEDURE — 97112 NEUROMUSCULAR REEDUCATION: CPT

## 2023-02-01 NOTE — FLOWSHEET NOTE
[facility]. and Therapy, [facility], [address] [place] [address]  Phone: [phone]  Fax [phone]    Physical Therapy Daily Treatment Note    Date:  2023    Patient Name:  Moira Pineda    :  1940  MRN: [id number]  Restrictions/Precautions:    Medical/Treatment Diagnosis Information:   R42 - vertigo  imbalance  Insurance/Certification information:   Medicare and [place]  Physician Information:   Jessica Rubi MD  Plan of care signed (Y/N):  Y  Visit# / total visits:       G-Code (if applicable):          DHI  0 (44 at eval)    Medicare Cap (if applicable):  013 = total amount used, updated 2023    Time in:   2:35    Timed Treatment: 30  Total Treatment Time:  30  Time out: 3:05  ________________________________________________________________________________________    Pain Level:    /10  SUBJECTIVE:  Pt reports that her spinning has been much better, hasn't noticed it for a few days. She did have a couple instances of spinning and lightheadedness while sitting in a chair that lasted for a few minutes - not sure why. OBJECTIVE:     Exercise/Equipment Resistance/Repetitions Other comments          Positional testing first thing  See below    Canalith repositioning for L PSC canalithiasis Semont maneuver for L PSC canalithiasis Improved nystagmus duration and intensity on second round. Asymptomatic upon sitting.                                                                                                                         Other Therapeutic Activities:  Education regarding POC including demonstration with models of anatomy and physiology in order to maximize benefits of treatment; total neuro re-ed 30 minutes    Manual Treatments:         Modalities:      Test/Measurements:      Positional Testing   R Hallpike-Ursula maneuver:               Nystagmus:     [] Yes             [x] No               [] Duration: [] Direction:                  Vertigo:            [] Yes             [x] No               [] Duration:   L Hallpike-[place] maneuver:              Nystagmus:     [] Yes             [x] No               [] Duration:                                         [] Direction:                  Vertigo:            [] Yes             [x] No               [] Duration:    Supine roll head right:              Nystagmus:     [] Yes             [x] No               [] Duration:                                           [] Direction:                  Vertigo:            [] Yes             [x] No               [] Duration:   Supine roll head left:              Nystagmus:     [] Yes             [x] No               [] Duration:                                           [] Direction:                  Vertigo:            [] Yes             [x] No               [] Duration:     Postural Control Tests:  Clinical Test of Sensory Interaction for Balance (CTSIB) performed in Romberg stance  CONDITION TIME STRATEGY SWAY    Eyes open, firm surface 30 ankle min    Eyes closed, firm surface 30 Ankle, hip mod    Eyes open, foam surface 30 Ankle, hip mod    Eyes closed, foam surface 30 (12 at eval) Ankle, hip max          ASSESSMENT: After 4 PT visits, pt is negative on positional testing for all semicircular canals. She has improved her [address] [place] [address] Street from 44 down to 0 and reports that she is not longer feeling unsteady on her feet. She is demonstrating improved balance on the Modified CTSIB. At this point, recommend pt to utilize remaining visits as needed. Let this not serve as discharge note once 30 days from today has passed. Pt understanding and agreeable with plan.      Treatment/Activity Tolerance:   [x]Patient tolerated treatment well [] Patient limited by fatique  []Patient limited by pain [] Patient limited by other medical complications  [] Other:        GOALS: Goals established 1/13/23   Patient stated goal: become more steady on her feet  [] Progressing: [x] Met: [] Not Met: [] Adjusted     Therapist goals for Patient:  Short Term Goals: To be achieved in: 2 weeks  Independent in HEP and progression per patient tolerance, in order to prevent re-injury. [] Progressing: [x] Met: [] Not Met: [] Adjusted  Patient will have a decrease in dizziness/imbalance/symptoms to facilitate improvement in movement, function, balance, and ADLS as indicated by Functional Deficits. [] Progressing: [x] Met: [] Not Met: [] Adjusted     Long Term Goals: To be achieved in:  4 weeks  Disability index score of 20% or less for the DHI/Alfred/FGA/DGI to assist with reaching prior level of function  [] Progressing: [x] Met: [] Not Met: [] Adjusted  Patient will demonstrate increased cervical AROM to WNL, joint mobility WNL to allow for proper joint functioning as indicated by Functional Deficits. [] Progressing: [x] Met: [] Not Met: [] Adjusted  Patient will demonstrate negative oculomotor special testing/positional testing as indicated by Functional Deficits. [] Progressing: [x] Met: [] Not Met: [] Adjusted  Patient will return to functional activities including looking up and walking across the room without increased symptoms or restriction.   [] Progressing: [x] Met: [] Not Met: [] Adjusted  Patient will achieve 120 seconds on Modified CTSIB to indicate improved vestibular input for balance (patient specific functional goal)  [] Progressing: [x] Met: [] Not Met: [] Adjusted       Plan: [] Continue per plan of care [x] Alter current plan (see comments)   [] Plan of care initiated [] Hold pending MD visit [] Discharge      Plan for Next Session:  positional testing, VRT    Re-Certification Due Date:         Signature:  Leandra Baca, PT

## 2023-02-07 ENCOUNTER — APPOINTMENT (OUTPATIENT)
Dept: PHYSICAL THERAPY | Age: 83
End: 2023-02-07
Payer: MEDICARE

## 2023-02-09 ENCOUNTER — APPOINTMENT (OUTPATIENT)
Dept: PHYSICAL THERAPY | Age: 83
End: 2023-02-09
Payer: MEDICARE

## 2023-02-14 ENCOUNTER — APPOINTMENT (OUTPATIENT)
Dept: PHYSICAL THERAPY | Age: 83
End: 2023-02-14
Payer: MEDICARE

## 2023-02-16 ENCOUNTER — APPOINTMENT (OUTPATIENT)
Dept: PHYSICAL THERAPY | Age: 83
End: 2023-02-16
Payer: MEDICARE

## 2023-02-23 ENCOUNTER — APPOINTMENT (OUTPATIENT)
Dept: PHYSICAL THERAPY | Age: 83
End: 2023-02-23
Payer: MEDICARE

## 2023-02-26 DIAGNOSIS — I10 ESSENTIAL HYPERTENSION: ICD-10-CM

## 2023-02-27 ENCOUNTER — OFFICE VISIT (OUTPATIENT)
Dept: ORTHOPEDIC SURGERY | Age: 83
End: 2023-02-27
Payer: MEDICARE

## 2023-02-27 VITALS — WEIGHT: 166 LBS | HEIGHT: 59 IN | BODY MASS INDEX: 33.47 KG/M2

## 2023-02-27 DIAGNOSIS — M54.16 LUMBAR RADICULITIS: ICD-10-CM

## 2023-02-27 DIAGNOSIS — G89.4 CHRONIC PAIN SYNDROME: ICD-10-CM

## 2023-02-27 DIAGNOSIS — M51.36 DDD (DEGENERATIVE DISC DISEASE), LUMBAR: ICD-10-CM

## 2023-02-27 PROCEDURE — 99213 OFFICE O/P EST LOW 20 MIN: CPT | Performed by: PHYSICIAN ASSISTANT

## 2023-02-27 PROCEDURE — G8427 DOCREV CUR MEDS BY ELIG CLIN: HCPCS | Performed by: PHYSICIAN ASSISTANT

## 2023-02-27 PROCEDURE — 1123F ACP DISCUSS/DSCN MKR DOCD: CPT | Performed by: PHYSICIAN ASSISTANT

## 2023-02-27 PROCEDURE — G8417 CALC BMI ABV UP PARAM F/U: HCPCS | Performed by: PHYSICIAN ASSISTANT

## 2023-02-27 PROCEDURE — G8400 PT W/DXA NO RESULTS DOC: HCPCS | Performed by: PHYSICIAN ASSISTANT

## 2023-02-27 PROCEDURE — 1090F PRES/ABSN URINE INCON ASSESS: CPT | Performed by: PHYSICIAN ASSISTANT

## 2023-02-27 PROCEDURE — 1036F TOBACCO NON-USER: CPT | Performed by: PHYSICIAN ASSISTANT

## 2023-02-27 PROCEDURE — G8484 FLU IMMUNIZE NO ADMIN: HCPCS | Performed by: PHYSICIAN ASSISTANT

## 2023-02-27 RX ORDER — LOSARTAN POTASSIUM AND HYDROCHLOROTHIAZIDE 12.5; 1 MG/1; MG/1
TABLET ORAL
Qty: 90 TABLET | Refills: 1 | Status: SHIPPED | OUTPATIENT
Start: 2023-02-27

## 2023-02-27 RX ORDER — HYDROCODONE BITARTRATE AND ACETAMINOPHEN 5; 325 MG/1; MG/1
1 TABLET ORAL PRN
Qty: 30 TABLET | Refills: 0 | Status: SHIPPED | OUTPATIENT
Start: 2023-03-29 | End: 2023-04-28

## 2023-02-27 RX ORDER — HYDROCODONE BITARTRATE AND ACETAMINOPHEN 5; 325 MG/1; MG/1
1 TABLET ORAL PRN
Qty: 30 TABLET | Refills: 0 | Status: SHIPPED | OUTPATIENT
Start: 2023-04-28 | End: 2023-05-28

## 2023-02-27 RX ORDER — HYDROCODONE BITARTRATE AND ACETAMINOPHEN 5; 325 MG/1; MG/1
1 TABLET ORAL EVERY 6 HOURS PRN
Qty: 28 TABLET | Refills: 0 | Status: CANCELLED | OUTPATIENT
Start: 2023-02-27 | End: 2023-03-06

## 2023-02-27 RX ORDER — HYDROCODONE BITARTRATE AND ACETAMINOPHEN 5; 325 MG/1; MG/1
1 TABLET ORAL PRN
Qty: 30 TABLET | Refills: 0 | Status: SHIPPED | OUTPATIENT
Start: 2023-02-27 | End: 2023-03-29

## 2023-02-27 NOTE — PROGRESS NOTES
MA Communication:   The following orders are received by verbal communication from Rico Wetzel MD    Orders include: 5-Fu Counseling: 5-Fluorouracil Counseling:  I discussed with the patient the risks of 5-fluorouracil including but not limited to erythema, scaling, itching, weeping, crusting, and pain.
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
## 2023-02-27 NOTE — TELEPHONE ENCOUNTER
Bri Phillips is requesting refill(s) losartan/hctz  Last OV 12/27/22 (pertaining to medication)  LR 7/18/22 (per medication requested)  Next office visit scheduled or attempted Yes   If no, reason:  4/4/23

## 2023-02-27 NOTE — TELEPHONE ENCOUNTER
Juanito Day is requesting refill(s) metformin  Last OV 12/27/22 (pertaining to medication)  LR 7/18/22 (per medication requested)  Next office visit scheduled or attempted Yes   If no, reason:  4/4/23

## 2023-02-28 ENCOUNTER — TELEMEDICINE (OUTPATIENT)
Dept: FAMILY MEDICINE CLINIC | Age: 83
End: 2023-02-28
Payer: MEDICARE

## 2023-02-28 DIAGNOSIS — Z00.00 MEDICARE ANNUAL WELLNESS VISIT, SUBSEQUENT: Primary | ICD-10-CM

## 2023-02-28 PROCEDURE — G0439 PPPS, SUBSEQ VISIT: HCPCS | Performed by: FAMILY MEDICINE

## 2023-02-28 PROCEDURE — 1123F ACP DISCUSS/DSCN MKR DOCD: CPT | Performed by: FAMILY MEDICINE

## 2023-02-28 ASSESSMENT — PATIENT HEALTH QUESTIONNAIRE - PHQ9
SUM OF ALL RESPONSES TO PHQ QUESTIONS 1-9: 12
SUM OF ALL RESPONSES TO PHQ9 QUESTIONS 1 & 2: 3
1. LITTLE INTEREST OR PLEASURE IN DOING THINGS: 3
2. FEELING DOWN, DEPRESSED OR HOPELESS: 0
7. TROUBLE CONCENTRATING ON THINGS, SUCH AS READING THE NEWSPAPER OR WATCHING TELEVISION: 0
6. FEELING BAD ABOUT YOURSELF - OR THAT YOU ARE A FAILURE OR HAVE LET YOURSELF OR YOUR FAMILY DOWN: 0
SUM OF ALL RESPONSES TO PHQ QUESTIONS 1-9: 12
10. IF YOU CHECKED OFF ANY PROBLEMS, HOW DIFFICULT HAVE THESE PROBLEMS MADE IT FOR YOU TO DO YOUR WORK, TAKE CARE OF THINGS AT HOME, OR GET ALONG WITH OTHER PEOPLE: 1
4. FEELING TIRED OR HAVING LITTLE ENERGY: 3
3. TROUBLE FALLING OR STAYING ASLEEP: 3
SUM OF ALL RESPONSES TO PHQ QUESTIONS 1-9: 12
8. MOVING OR SPEAKING SO SLOWLY THAT OTHER PEOPLE COULD HAVE NOTICED. OR THE OPPOSITE, BEING SO FIGETY OR RESTLESS THAT YOU HAVE BEEN MOVING AROUND A LOT MORE THAN USUAL: 0
5. POOR APPETITE OR OVEREATING: 3
SUM OF ALL RESPONSES TO PHQ QUESTIONS 1-9: 12
9. THOUGHTS THAT YOU WOULD BE BETTER OFF DEAD, OR OF HURTING YOURSELF: 0

## 2023-02-28 ASSESSMENT — LIFESTYLE VARIABLES
HOW MANY STANDARD DRINKS CONTAINING ALCOHOL DO YOU HAVE ON A TYPICAL DAY: PATIENT DOES NOT DRINK
HOW OFTEN DO YOU HAVE A DRINK CONTAINING ALCOHOL: NEVER

## 2023-02-28 NOTE — PATIENT INSTRUCTIONS
Personalized Preventive Plan for Trisha Aguirre - 2/28/2023  Medicare offers a range of preventive health benefits. Some of the tests and screenings are paid in full while other may be subject to a deductible, co-insurance, and/or copay. Some of these benefits include a comprehensive review of your medical history including lifestyle, illnesses that may run in your family, and various assessments and screenings as appropriate. After reviewing your medical record and screening and assessments performed today your provider may have ordered immunizations, labs, imaging, and/or referrals for you. A list of these orders (if applicable) as well as your Preventive Care list are included within your After Visit Summary for your review. Other Preventive Recommendations:    A preventive eye exam performed by an eye specialist is recommended every 1-2 years to screen for glaucoma; cataracts, macular degeneration, and other eye disorders. A preventive dental visit is recommended every 6 months. Try to get at least 150 minutes of exercise per week or 10,000 steps per day on a pedometer . Order or download the FREE \"Exercise & Physical Activity: Your Everyday Guide\" from [employer]. Call 5-714.838.3695 or search [employer] online. You need 9954-8151 mg of calcium and 9215-3038 IU of vitamin D per day. It is possible to meet your calcium requirement with diet alone, but a vitamin D supplement is usually necessary to meet this goal.  When exposed to the sun, use a sunscreen that protects against both UVA and UVB radiation with an SPF of 30 or greater. Reapply every 2 to 3 hours or after sweating, drying off with a towel, or swimming. Always wear a seat belt when traveling in a car. Always wear a helmet when riding a bicycle or motorcycle.

## 2023-02-28 NOTE — PROGRESS NOTES
Medicare Annual Wellness Visit    Paul Currie is here for Medicare AWV    Assessment & Plan   Medicare annual wellness visit, subsequent      Recommendations for Preventive Services Due: see orders and patient instructions/AVS.  Recommended screening schedule for the next 5-10 years is provided to the patient in written form: see Patient Instructions/AVS.     No follow-ups on file. Subjective       Patient's complete Health Risk Assessment and screening values have been reviewed and are found in Flowsheets. The following problems were reviewed today and where indicated follow up appointments were made and/or referrals ordered. Positive Risk Factor Screenings with Interventions:    Fall Risk:  Do you feel unsteady or are you worried about falling? : (!) yes  2 or more falls in past year?: (!) yes  Fall with injury in past year?: no     Interventions:    See AVS for additional education material     Depression:  PHQ-2 Score: 3  PHQ-9 Total Score: 12    Interpretation:   1-4 = minimal  5-9 = mild  10-14 = moderate  15-19 = moderately severe  20-27 = severe  Interventions:  Patient comments: does now wish to discuss depression- will discuss sleeping problem at next office visit and with  sleep clinic           Opioid Risk: (Low risk score <55) Opioid risk score: 8    Patient is low risk for opioid use disorder or overdose. Last PDMP Thersa Bras as Reviewed:  Review User Review Instant Review Result   Carlos Eduardo COBB 11/28/2022  3:11 PM     Reviewed PDMP [1]     Last Controlled Substance Monitoring Documentation      [address] Office Visit from 11/28/2022 in [address] [place] [address]    Periodic Controlled Substance Monitoring Possible medication side effects, risk of tolerance/dependence & alternative treatments discussed., No signs of potential drug abuse or diversion identified. , Assessed functional status., Obtaining appropriate analgesic effect of treatment.  filed at 11/28/2022 1111 Chronic Pain > 50 MEDD Re-evaluated the status of the patient's underlying condition causing pain., Obtained or confirmed \"Consent for Opioid Use\" on file. filed at 11/28/2022 1604            Opioid Risk: (Low risk score ? 55)  Opioid risk score: 8    Any opioid medication usage will be addressed by their licensed provider at a future visit. Social and Emotional Support:  Do you get the social and emotional support that you need?: (!) No  Interventions:  States has no family and does not socialize    Weight and Activity:  Physical Activity: Inactive    Days of Exercise per Week: 0 days    Minutes of Exercise per Session: 0 min     On average, how many days per week do you engage in moderate to strenuous exercise (like a brisk walk)?: 0 days  Have you lost any weight without trying in the past 3 months?: No         Inactivity Interventions:  See AVS for additional education material  Obesity Interventions:  See AVS for additional education material             Safety:  Do you have any tripping hazards - clutter in doorways, halls, or stairs?: (!) Yes  Interventions:  See AVS for additional education material                        Objective      Patient-Reported Vitals  Patient-Reported Systolic (Top): 269 mmHg  Patient-Reported Diastolic (Bottom): 65 mmHg  Patient-Reported Weight: 166lb  Patient-Reported Height: 4' 11.5\"              Allergies   Allergen Reactions    Diovan [Valsartan] Anaphylaxis     Throat swelling      Bee Venom     Formaldehyde Itching    Iodine Hives     IVP dye    Lisinopril      Throat swelling  Angioedema      Niaspan [Niacin Er]      facial rash and itching    Amoxicillin Rash, Hives and Itching     Prior to Visit Medications    Medication Sig Taking?  Authorizing Provider   losartan-hydroCHLOROthiazide (HYZAAR) 100-12.5 MG per tablet TAKE ONE TABLET BY MOUTH DAILY  Ernesto Allen MD   metFORMIN (GLUCOPHAGE) 1000 MG tablet TAKE ONE TABLET BY MOUTH TWICE A DAY WITH MEALS  Kailee SHARMA Tolu Rebolledo MD   HYDROcodone-acetaminophen (NORCO) 5-325 MG per tablet Take 1 tablet by mouth as needed for Pain for up to 30 days. Max one tablet a day. As needed for pain. CHRISTI Manzo   HYDROcodone-acetaminophen (NORCO) 5-325 MG per tablet Take 1 tablet by mouth as needed for Pain for up to 30 days. Max one tablet a day. As needed for pain. CHRISTI Manzo   HYDROcodone-acetaminophen (NORCO) 5-325 MG per tablet Take 1 tablet by mouth as needed for Pain for up to 30 days. One tablet total a day. As needed for pain.   CHRISTI Manzo   escitalopram (LEXAPRO) 20 MG tablet TAKE ONE TABLET BY MOUTH DAILY  Lily Bains MD   FARXIGA 10 MG tablet TAKE ONE TABLET BY MOUTH EVERY MORNING  Lily Bains MD   valACYclovir (VALTREX) 1 g tablet TAKE ONE TABLET BY MOUTH EVERY 12 HOURS  [address], [place]   atorvastatin (LIPITOR) 40 MG tablet TAKE ONE TABLET BY MOUTH DAILY  Nikole Leong MD   ferrous sulfate (IRON 325) 325 (65 Fe) MG tablet Take 325 mg by mouth daily (with breakfast)  Historical Provider, MD   dicyclomine (BENTYL) 10 MG capsule Take 1 capsule by mouth 4 times daily as needed (cramping)  Lily Bains MD   Omega-3 Fatty Acids ([address]) [address] TWICE A DAY  Nikole Leong MD   aspirin 81 MG EC tablet Take 81 mg by mouth daily  Historical Provider, MD   ipratropium (ATROVENT) 0.03 % nasal spray 2 sprays by Each Nostril route every 12 hours  Gladystine MD Félix   budesonide-formoterol (SYMBICORT) 160-4.5 MCG/ACT AERO Inhale 2 puffs into the lungs 2 times daily  Lily Bains MD   pantoprazole (PROTONIX) 40 MG tablet   Historical Provider, MD   ondansetron (ZOFRAN-ODT) 4 MG disintegrating tablet   Historical Provider, MD [place]-Glycopyrrol-Formoterol (BREZTRI AEROSPHERE) 160-9-4.8 MCG/ACT AERO Inhale 2 puffs into the lungs 2 times daily  Lily Bains MD   Collagen Hydrolysate POWD by Does not apply route  Historical Provider, MD Rodriguez (Including outside providers/suppliers regularly involved in providing care):   Patient Care Team:  Kelle Fowler MD as PCP - General (Family Medicine)  Kelle Fowler MD [facility]  Cara Quezada RN as Registered Nurse     Reviewed and updated this visit:  Tobacco  Allergies  Meds  Med Hx  Surg Hx  Soc Hx  Fam Hx        I, Walker Carpenter LPN, [birth year], performed the documented evaluation under the direct supervision of the attending physician. Ericka Whalen, was evaluated through a synchronous (real-time) audio-video encounter. The patient (or guardian if applicable) is aware that this is a billable service, which includes applicable co-pays. This Virtual Visit was conducted with patient's (and/or legal guardian's) consent. The visit was conducted pursuant to the emergency declaration under the [address] [place] [address], [address] [place] [address] [facility] authority and the [employer] General Act. Patient identification was verified, and a caregiver was present when appropriate. The patient was located at Home: [address] [place] [address] Dr Staci Day  Provider was located at [facility] (Appt Dept): [address] [place] [address]    This encounter was performed under my, Mihai Bill, direct supervision, 2/28/2023.       Walker Carpenter LPN

## 2023-03-06 ENCOUNTER — HOSPITAL ENCOUNTER (OUTPATIENT)
Dept: CT IMAGING | Age: 83
Discharge: HOME | End: 2023-03-06
Payer: MEDICARE

## 2023-03-06 DIAGNOSIS — R91.1 LUNG NODULE: ICD-10-CM

## 2023-03-06 PROCEDURE — 71250 CT THORAX DX C-: CPT

## 2023-03-07 ENCOUNTER — OFFICE VISIT (OUTPATIENT)
Dept: PULMONOLOGY | Age: 83
End: 2023-03-07
Payer: MEDICARE

## 2023-03-07 VITALS
RESPIRATION RATE: 16 BRPM | SYSTOLIC BLOOD PRESSURE: 133 MMHG | HEIGHT: 59 IN | DIASTOLIC BLOOD PRESSURE: 79 MMHG | TEMPERATURE: 97.8 F | WEIGHT: 167 LBS | HEART RATE: 77 BPM | BODY MASS INDEX: 33.67 KG/M2 | OXYGEN SATURATION: 94 %

## 2023-03-07 DIAGNOSIS — J31.0 CHRONIC RHINITIS: ICD-10-CM

## 2023-03-07 DIAGNOSIS — R91.1 NODULE OF LOWER LOBE OF LEFT LUNG: ICD-10-CM

## 2023-03-07 DIAGNOSIS — J43.8 OTHER EMPHYSEMA (HCC): ICD-10-CM

## 2023-03-07 DIAGNOSIS — Z87.891 FORMER HEAVY CIGARETTE SMOKER (20-39 PER DAY): ICD-10-CM

## 2023-03-07 DIAGNOSIS — R91.1 INCIDENTAL LUNG NODULE, GREATER THAN OR EQUAL TO 8MM: Primary | ICD-10-CM

## 2023-03-07 DIAGNOSIS — G47.00 INSOMNIA, UNSPECIFIED TYPE: ICD-10-CM

## 2023-03-07 PROBLEM — H35.3110 NONEXUDATIVE AGE-RELATED MACULAR DEGENERATION OF RIGHT EYE: Status: ACTIVE | Noted: 2022-11-09

## 2023-03-07 PROCEDURE — 1123F ACP DISCUSS/DSCN MKR DOCD: CPT | Performed by: NURSE PRACTITIONER

## 2023-03-07 PROCEDURE — 1036F TOBACCO NON-USER: CPT | Performed by: NURSE PRACTITIONER

## 2023-03-07 PROCEDURE — G8427 DOCREV CUR MEDS BY ELIG CLIN: HCPCS | Performed by: NURSE PRACTITIONER

## 2023-03-07 PROCEDURE — 3075F SYST BP GE 130 - 139MM HG: CPT | Performed by: NURSE PRACTITIONER

## 2023-03-07 PROCEDURE — G8417 CALC BMI ABV UP PARAM F/U: HCPCS | Performed by: NURSE PRACTITIONER

## 2023-03-07 PROCEDURE — 1090F PRES/ABSN URINE INCON ASSESS: CPT | Performed by: NURSE PRACTITIONER

## 2023-03-07 PROCEDURE — G8400 PT W/DXA NO RESULTS DOC: HCPCS | Performed by: NURSE PRACTITIONER

## 2023-03-07 PROCEDURE — G8484 FLU IMMUNIZE NO ADMIN: HCPCS | Performed by: NURSE PRACTITIONER

## 2023-03-07 PROCEDURE — 3078F DIAST BP <80 MM HG: CPT | Performed by: NURSE PRACTITIONER

## 2023-03-07 PROCEDURE — 3023F SPIROM DOC REV: CPT | Performed by: NURSE PRACTITIONER

## 2023-03-07 PROCEDURE — 99214 OFFICE O/P EST MOD 30 MIN: CPT | Performed by: NURSE PRACTITIONER

## 2023-03-07 RX ORDER — IPRATROPIUM BROMIDE 21 UG/1
2 SPRAY, METERED NASAL EVERY 12 HOURS
Qty: 30 ML | Refills: 3 | Status: SHIPPED | OUTPATIENT
Start: 2023-03-07

## 2023-03-07 ASSESSMENT — ENCOUNTER SYMPTOMS
ABDOMINAL PAIN: 0
VOMITING: 0
DIARRHEA: 0
SORE THROAT: 0
COUGH: 0
RHINORRHEA: 1
CHEST TIGHTNESS: 0
SHORTNESS OF BREATH: 0
WHEEZING: 0
EYE PAIN: 0
NAUSEA: 0

## 2023-03-07 NOTE — PATIENT INSTRUCTIONS
Call with worsening symptoms such as increased shortness of breath, productive cough, wheezing or symptoms not responding to treatment plan. Will have radiology review CT scan for possible lung biopsy    Return to clinic in 3 months with Dr. Ines Howard     Can try Melatonin time release, sustained release and see if this helps. Natrol Melatonin time released on [employer], purple and white bottle. Remember to bring a list of pulmonary medications and any CPAP or BiPAP machines to your next appointment with the office. Please keep all of your future appointments scheduled by [facility] office. Out of respect for other patients and providers, you may be asked to reschedule your appointment if you arrive later than your scheduled appointment time. Appointments cancelled less than 24hrs in advance will be considered a no show. Patients with three missed appointments within 1 year or four missed appointments within 2 years can be dismissed from the practice. Please be aware that our physicians are required to work in the Intensive Care Unit at [facility].  Your appointment may need to be rescheduled if they are designated to work during your appointment time. You may receive a survey regarding the care you received during your visit. Your input is valuable to us. We encourage you to complete and return your survey. We hope you will choose us in the future for your healthcare needs. Pt instructed of all future appointment dates & times, including radiology, labs, procedures & referrals. If procedures were scheduled preparation instructions provided. Instructions on future appointments with [facility] were given. MA Communication:   The following orders are received by verbal communication from Isis Hebert CNP    Orders include:  CT Needles Biopsy Lung Percutaneous   Scheduling will contact you about this procedure     Follow up in  3 months with Dr. Jamar Bond  June 7, 2023  appointment time 1:45pm
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
## 2023-03-08 ENCOUNTER — TELEPHONE (OUTPATIENT)
Dept: INTERVENTIONAL RADIOLOGY/VASCULAR | Age: 83
End: 2023-03-08

## 2023-03-08 NOTE — TELEPHONE ENCOUNTER
Called and spoke to [facility] about upcoming procedure. Phone number used: [phone]  Procedure: lung biopsy  Approving Radiologist: Regino Brooks    Pt informed of the following:  Date of procedure: 3/15  Arrival time of procedure: 0900  Time of procedure: 1030    On any blood thinners? Yes. If yes: Aspirin  Instructed to hold thinners starting Saturday 3/11    Blood pressure medication? Yes. If yes need to make sure take morning of procedure. Any issues with sedation in the past? no  Will receive versed and fentanyl for sedation will need a  day of procedure. H&P or office note within 30 days? yes -     After procedure will be here 2-4 hours after procedure for monitoring. Nothing to eat or drink at midnight.      Need COVID testing prior: No    Need SDS: Yes

## 2023-03-15 ENCOUNTER — HOSPITAL ENCOUNTER (OUTPATIENT)
Dept: CT IMAGING | Age: 83
Discharge: HOME | End: 2023-03-15
Payer: MEDICARE

## 2023-03-15 ENCOUNTER — HOSPITAL ENCOUNTER (OUTPATIENT)
Dept: GENERAL RADIOLOGY | Age: 83
Discharge: HOME | End: 2023-03-15
Payer: MEDICARE

## 2023-03-15 VITALS
OXYGEN SATURATION: 96 % | RESPIRATION RATE: 16 BRPM | BODY MASS INDEX: 33.47 KG/M2 | DIASTOLIC BLOOD PRESSURE: 63 MMHG | SYSTOLIC BLOOD PRESSURE: 155 MMHG | TEMPERATURE: 98.2 F | HEART RATE: 69 BPM | HEIGHT: 59 IN | WEIGHT: 166 LBS

## 2023-03-15 DIAGNOSIS — Z87.891 FORMER HEAVY CIGARETTE SMOKER (20-39 PER DAY): ICD-10-CM

## 2023-03-15 DIAGNOSIS — R91.1 NODULE OF LOWER LOBE OF LEFT LUNG: ICD-10-CM

## 2023-03-15 LAB
BASOPHILS # BLD: 0 K/UL (ref 0–0.2)
BASOPHILS NFR BLD: 0.5 %
CREAT SERPL-MCNC: <0.5 MG/DL (ref 0.6–1.2)
DEPRECATED RDW RBC AUTO: 14.6 % (ref 12.4–15.4)
EOSINOPHIL # BLD: 0.3 K/UL (ref 0–0.6)
EOSINOPHIL NFR BLD: 2.9 %
GFR SERPLBLD CREATININE-BSD FMLA CKD-EPI: >60 ML/MIN/{1.73_M2}
GLUCOSE BLD-MCNC: 129 MG/DL (ref 70–99)
HCT VFR BLD AUTO: 41 % (ref 36–48)
HGB BLD-MCNC: 13.6 G/DL (ref 12–16)
INR PPP: 0.91 (ref 0.87–1.14)
LYMPHOCYTES # BLD: 2.3 K/UL (ref 1–5.1)
LYMPHOCYTES NFR BLD: 26.6 %
MCH RBC QN AUTO: 28 PG (ref 26–34)
MCHC RBC AUTO-ENTMCNC: 33.2 G/DL (ref 31–36)
MCV RBC AUTO: 84.5 FL (ref 80–100)
MONOCYTES # BLD: 0.7 K/UL (ref 0–1.3)
MONOCYTES NFR BLD: 8.2 %
NEUTROPHILS # BLD: 5.4 K/UL (ref 1.7–7.7)
NEUTROPHILS NFR BLD: 61.8 %
PERFORMED ON: ABNORMAL
PLATELET # BLD AUTO: 297 K/UL (ref 135–450)
PMV BLD AUTO: 7.8 FL (ref 5–10.5)
PROTHROMBIN TIME: 12.1 SEC (ref 11.7–14.5)
RBC # BLD AUTO: 4.85 M/UL (ref 4–5.2)
WBC # BLD AUTO: 8.7 K/UL (ref 4–11)

## 2023-03-15 PROCEDURE — 71045 X-RAY EXAM CHEST 1 VIEW: CPT

## 2023-03-15 PROCEDURE — 85025 COMPLETE CBC W/AUTO DIFF WBC: CPT

## 2023-03-15 PROCEDURE — 2709999900 CT NEEDLE BIOPSY LUNG PERCUTANEOUS W IMAGING GUIDANCE

## 2023-03-15 PROCEDURE — 85610 PROTHROMBIN TIME: CPT

## 2023-03-15 PROCEDURE — 36415 COLL VENOUS BLD VENIPUNCTURE: CPT

## 2023-03-15 PROCEDURE — 77012 CT SCAN FOR NEEDLE BIOPSY: CPT

## 2023-03-15 PROCEDURE — 6370000000 HC RX 637 (ALT 250 FOR IP): Performed by: RADIOLOGY

## 2023-03-15 PROCEDURE — 82565 ASSAY OF CREATININE: CPT

## 2023-03-15 PROCEDURE — 7100000011 HC PHASE II RECOVERY - ADDTL 15 MIN

## 2023-03-15 PROCEDURE — 7100000010 HC PHASE II RECOVERY - FIRST 15 MIN

## 2023-03-15 PROCEDURE — 6360000002 HC RX W HCPCS: Performed by: RADIOLOGY

## 2023-03-15 RX ORDER — FENTANYL CITRATE 50 UG/ML
INJECTION, SOLUTION INTRAMUSCULAR; INTRAVENOUS
Status: COMPLETED | OUTPATIENT
Start: 2023-03-15 | End: 2023-03-15

## 2023-03-15 RX ORDER — MIDAZOLAM HYDROCHLORIDE 1 MG/ML
INJECTION INTRAMUSCULAR; INTRAVENOUS
Status: COMPLETED | OUTPATIENT
Start: 2023-03-15 | End: 2023-03-15

## 2023-03-15 RX ORDER — HYDROCODONE BITARTRATE AND ACETAMINOPHEN 5; 325 MG/1; MG/1
2 TABLET ORAL ONCE
Status: COMPLETED | OUTPATIENT
Start: 2023-03-15 | End: 2023-03-15

## 2023-03-15 RX ADMIN — MIDAZOLAM HYDROCHLORIDE 0.5 MG: 2 INJECTION, SOLUTION INTRAMUSCULAR; INTRAVENOUS at 11:30

## 2023-03-15 RX ADMIN — FENTANYL CITRATE 50 MCG: 50 INJECTION INTRAMUSCULAR; INTRAVENOUS at 11:24

## 2023-03-15 RX ADMIN — FENTANYL CITRATE 25 MCG: 50 INJECTION INTRAMUSCULAR; INTRAVENOUS at 11:30

## 2023-03-15 RX ADMIN — HYDROCODONE BITARTRATE AND ACETAMINOPHEN 2 TABLET: 5; 325 TABLET ORAL at 13:30

## 2023-03-15 RX ADMIN — MIDAZOLAM HYDROCHLORIDE 1 MG: 2 INJECTION, SOLUTION INTRAMUSCULAR; INTRAVENOUS at 11:24

## 2023-03-15 ASSESSMENT — PAIN - FUNCTIONAL ASSESSMENT: PAIN_FUNCTIONAL_ASSESSMENT: 0-10

## 2023-03-15 NOTE — PROGRESS NOTES
Patient meets criteria for discharge per policy. Patient up to the bathroom to void without difficulty. Discharge instructions given to patient and friend over the telephone, verbalized understanding. PIV removed. Patient discharged via wheelchair to the care of their friend in stable condition.

## 2023-03-15 NOTE — PROGRESS NOTES
Patient admitted to [facility] in preparation for procedure, VSS. Consent per IR. IV inserted into RAC, NS locked. Belongings in locker 5. NPO since 2200.

## 2023-03-15 NOTE — OR NURSING
Image guided lung nodule biopsy biopsy completed by Dr. Manuelito Cooper. Pt tolerated procedure without any signs or symptoms of distress. Vital signs stable. Report given  to  RN. Pt transported back to [facility] in stable condition via stretcher. Total Biopsy: 0 UNABLE TO GET  Received: Versed: 1.5 mg       Fentanyl: 75 mcg  Bandage to LEFT MIDDLE BACK that is clean dry and intact. Patient to lay on BACK side for 1 hour.      Vital Signs  Vitals:    03/15/23 1148   BP: (!) 142/81   Pulse: 72   Resp: 18   Temp:    SpO2:     (vital signs in table format)    Post Chu  2 - Able to move 4 extremities voluntarily on command  2 - BP+/- 20mmHg of normal  2 - Fully awake  2 - Able to maintain oxygen saturation >92% on room air  2 - Able to breathe deeply and cough freely     This RN wasted the following with MARV KELLER.  0.5 mg Versed  25 mcg Fentanyl

## 2023-03-15 NOTE — PROGRESS NOTES
RADIOLOGY:  Patient status post attempt at performing biopsy of left lower lobe pulmonary nodule. Significant variability of patient breathing precluded ability to target the nodule with introducer needle. Despite numerous attempts, tip of localizer needle could not be advanced into the nodule. With this in mind, the procedure was aborted. Reason for aborting the procedure was discussed with the patient. Patient tolerated the procedure well.

## 2023-03-15 NOTE — OR NURSING
After multiple attempts to get sample of nodule unable to get biopsy. Will get repeat xray in couple hours.  See orders and Dr Manuel Gary note

## 2023-03-15 NOTE — OR NURSING
Time out completed prior to procedure. Pt was place left side on the CT table. Pt was placed on all cardiac monitoring. Pt placed on 2 L NC for sedation. Blood patch was drawn prior to procedure.

## 2023-03-15 NOTE — OR NURSING
Pt arrived for image guided liver biopsy LLL. Procedure explained including the risk and benefits of the procedure. All questions answered. Pt verbalizes understanding of the of procedure and states no more questions. Consent signed. Vital signs stable, labs, allergies, medications, and code status reviewed. No contraindications noted.      Vitals:    03/15/23 1102   BP: (!) 157/68   Pulse: 71   Resp: 17   Temp:    SpO2: 93%    (vital signs in table format)    Chu Score  2 - Able to move 4 extremities voluntarily on command  2 - BP+/- 20mmHg of normal  2 - Fully awake  2 - Able to maintain oxygen saturation >92% on room air  2 - Able to breathe deeply and cough freely    Allergies  Diovan [valsartan], Bee venom, Formaldehyde, Iodine, Lisinopril, Niaspan [niacin er], and Amoxicillin    Labs  Lab Results   Component Value Date    INR 0.91 03/15/2023    PROTIME 12.1 03/15/2023       Lab Results   Component Value Date    CREATININE <0.5 (L) 03/15/2023    BUN 16 12/27/2022    GFR 40 (L) 02/19/2010     (L) 12/27/2022    K 4.4 12/27/2022    CL 96 (L) 12/27/2022    CO2 26 12/27/2022       Lab Results   Component Value Date    WBC 8.7 03/15/2023    HGB 13.6 03/15/2023    HCT 41.0 03/15/2023    MCV 84.5 03/15/2023     03/15/2023

## 2023-03-16 ENCOUNTER — TELEPHONE (OUTPATIENT)
Dept: PULMONOLOGY | Age: 83
End: 2023-03-16

## 2023-03-16 NOTE — TELEPHONE ENCOUNTER
Patient called in very concerned and stated she can not wait till June for her next appointment to find out what Dr. Rob Choudhury is going to instruct her to do   Procedure not successful   Patient stated she wants Dr. Val Renae or his nurse to call and let her know what the next step is  Patient stated it was very painful and she is unsure she can go thru that again      Patient status post attempt at performing biopsy of left lower lobe pulmonary nodule. Significant variability of patient breathing precluded ability to target the nodule with introducer needle. Despite numerous attempts, tip of localizer needle could not be advanced into the nodule. With this in mind, the procedure was aborted. Reason for aborting the procedure was discussed with the patient. Patient tolerated the procedure well.

## 2023-03-17 ENCOUNTER — TELEPHONE (OUTPATIENT)
Dept: PULMONOLOGY | Age: 83
End: 2023-03-17

## 2023-03-17 DIAGNOSIS — R91.1 NODULE OF LOWER LOBE OF LEFT LUNG: Primary | ICD-10-CM

## 2023-03-17 DIAGNOSIS — Z87.891 FORMER HEAVY CIGARETTE SMOKER (20-39 PER DAY): ICD-10-CM

## 2023-03-17 NOTE — TELEPHONE ENCOUNTER
Spoke with Gill about her unsuccessful biopsy. After reviewing CT again with Dr. Yudith Crow, will get PET scan. Carmel Jurado verbalized understanding and thanked for care. This has been ordered. Please call her schedule.

## 2023-03-17 NOTE — TELEPHONE ENCOUNTER
I have called and informed the patient   I have  her scheduled for her PET/CT   03/24/2023  Arrival time 11:15am   Test will start at 11:45am   [facility] Location  I have faxed all supporting documents their office needs for Prior Auth for the test

## 2023-03-24 ENCOUNTER — HOSPITAL ENCOUNTER (OUTPATIENT)
Dept: PET IMAGING | Age: 83
Discharge: HOME | End: 2023-03-24
Payer: MEDICARE

## 2023-03-24 DIAGNOSIS — R91.1 NODULE OF LOWER LOBE OF LEFT LUNG: ICD-10-CM

## 2023-03-24 DIAGNOSIS — Z87.891 FORMER HEAVY CIGARETTE SMOKER (20-39 PER DAY): ICD-10-CM

## 2023-03-24 PROCEDURE — 78815 PET IMAGE W/CT SKULL-THIGH: CPT

## 2023-03-24 PROCEDURE — 3430000000 HC RX DIAGNOSTIC RADIOPHARMACEUTICAL: Performed by: NURSE PRACTITIONER

## 2023-03-24 PROCEDURE — A9552 F18 FDG: HCPCS | Performed by: NURSE PRACTITIONER

## 2023-03-24 RX ORDER — FLUDEOXYGLUCOSE F 18 200 MCI/ML
14.14 INJECTION, SOLUTION INTRAVENOUS
Status: COMPLETED | OUTPATIENT
Start: 2023-03-24 | End: 2023-03-24

## 2023-03-24 RX ADMIN — FLUDEOXYGLUCOSE F 18 14.14 MILLICURIE: 200 INJECTION, SOLUTION INTRAVENOUS at 11:30

## 2023-03-28 ENCOUNTER — OFFICE VISIT (OUTPATIENT)
Dept: PULMONOLOGY | Age: 83
End: 2023-03-28
Payer: MEDICARE

## 2023-03-28 VITALS
OXYGEN SATURATION: 97 % | DIASTOLIC BLOOD PRESSURE: 70 MMHG | TEMPERATURE: 97.8 F | RESPIRATION RATE: 16 BRPM | BODY MASS INDEX: 33.47 KG/M2 | SYSTOLIC BLOOD PRESSURE: 124 MMHG | HEART RATE: 74 BPM | WEIGHT: 166 LBS | HEIGHT: 59 IN

## 2023-03-28 DIAGNOSIS — Z87.891 FORMER HEAVY CIGARETTE SMOKER (20-39 PER DAY): ICD-10-CM

## 2023-03-28 DIAGNOSIS — J32.9 SINUSITIS, UNSPECIFIED CHRONICITY, UNSPECIFIED LOCATION: ICD-10-CM

## 2023-03-28 DIAGNOSIS — G47.10 HYPERSOMNOLENCE: ICD-10-CM

## 2023-03-28 DIAGNOSIS — R91.1 NODULE OF LOWER LOBE OF LEFT LUNG: Primary | ICD-10-CM

## 2023-03-28 DIAGNOSIS — G47.00 INSOMNIA, UNSPECIFIED TYPE: ICD-10-CM

## 2023-03-28 PROCEDURE — 3074F SYST BP LT 130 MM HG: CPT | Performed by: INTERNAL MEDICINE

## 2023-03-28 PROCEDURE — G8427 DOCREV CUR MEDS BY ELIG CLIN: HCPCS | Performed by: INTERNAL MEDICINE

## 2023-03-28 PROCEDURE — 3078F DIAST BP <80 MM HG: CPT | Performed by: INTERNAL MEDICINE

## 2023-03-28 PROCEDURE — G8417 CALC BMI ABV UP PARAM F/U: HCPCS | Performed by: INTERNAL MEDICINE

## 2023-03-28 PROCEDURE — 1090F PRES/ABSN URINE INCON ASSESS: CPT | Performed by: INTERNAL MEDICINE

## 2023-03-28 PROCEDURE — 1123F ACP DISCUSS/DSCN MKR DOCD: CPT | Performed by: INTERNAL MEDICINE

## 2023-03-28 PROCEDURE — 99214 OFFICE O/P EST MOD 30 MIN: CPT | Performed by: INTERNAL MEDICINE

## 2023-03-28 PROCEDURE — G8484 FLU IMMUNIZE NO ADMIN: HCPCS | Performed by: INTERNAL MEDICINE

## 2023-03-28 PROCEDURE — 1036F TOBACCO NON-USER: CPT | Performed by: INTERNAL MEDICINE

## 2023-03-28 PROCEDURE — G8400 PT W/DXA NO RESULTS DOC: HCPCS | Performed by: INTERNAL MEDICINE

## 2023-03-28 ASSESSMENT — ENCOUNTER SYMPTOMS
ALLERGIC/IMMUNOLOGIC NEGATIVE: 1
HEMOPTYSIS: 0
RHINORRHEA: 0
ORTHOPNEA: 0
EYES NEGATIVE: 1
ABDOMINAL PAIN: 0
SWOLLEN GLANDS: 0
WHEEZING: 0
SHORTNESS OF BREATH: 1
VOMITING: 0
SORE THROAT: 0
SPUTUM PRODUCTION: 0
GASTROINTESTINAL NEGATIVE: 1

## 2023-03-28 NOTE — LETTER
March 28, 2023       Aden Bernal YOB: 1940   Jami Nava Date of Visit:  3/28/2023     3/28/23        Adendarlene Bernal      I have seen this patient in the office today and wanted to communicate my findings and recommendations. Patient Instructions          ASSESSMENT/PLAN:  1. Nodule of lower lobe of left lung  2. Former heavy cigarette smoker (20-39 per day)  3. Sinusitis, unspecified chronicity, unspecified location  4. Hypersomnolence  5. Insomnia, unspecified type        Lung nodule  Low-dose CT scan done 10/28/2021     Impression   New noncalcified nodule in the left lower lobe measuring 7 mm. There is mild   underlying emphysema. Severe coronary artery calcification       LUNG RADS:   Per ACR Lung-RADS Version 1.1       Category 4A,   This is the 7 mm nodule                Repeat CT scan done 5/17/2022    Impression   1. Unchanged solid subcentimeter bilateral pulmonary nodules including a 9 mm   solid left lower lobe pulmonary nodule. Recommend PET-CT for further   evaluation. Repeat CT scan done 8/30/2022  Impression   Chronic changes with stable left lower lobe 7 mm pulmonary nodule unchanged   from prior comparison 05/17/2022. Lung rads category 3 with recommended   follow-up within 6 months         Repeat CT scan done 3/7/2023  Impression   1. Left lower lobe lung nodule measuring 1.3 x 1.7 cm enlarged from prior   study and is highly concerning for lung malignancy. 2. No mediastinal adenopathy. 3. Mild upper lobe emphysema. 4. Additional findings as above. Sent for needle biopsy however radiology could not reach the site    PET scan done 3/25/2023    Impression   1. The enlarging nodule in the medial left lower lobe is hypermetabolic and   suspicious for potential malignancy. 2. Focal anorectal uptake is nonspecific. Suggest correlation with endoscopy   if not recently performed to exclude an underlying lesion.

## 2023-03-28 NOTE — LETTER
March 28, 2023       Liban Tariq YOB: 1940   Jami Nava Date of Visit:  3/28/2023       3/28/23        Liban Tariq      I have seen this patient in the office today and wanted to communicate my findings and recommendations. Patient Instructions          ASSESSMENT/PLAN:  1. Nodule of lower lobe of left lung  2. Former heavy cigarette smoker (20-39 per day)  3. Sinusitis, unspecified chronicity, unspecified location  4. Hypersomnolence  5. Insomnia, unspecified type        Lung nodule  Low-dose CT scan done 10/28/2021     Impression   New noncalcified nodule in the left lower lobe measuring 7 mm. There is mild   underlying emphysema. Severe coronary artery calcification       LUNG RADS:   Per ACR Lung-RADS Version 1.1       Category 4A,   This is the 7 mm nodule                Repeat CT scan done 5/17/2022    Impression   1. Unchanged solid subcentimeter bilateral pulmonary nodules including a 9 mm   solid left lower lobe pulmonary nodule. Recommend PET-CT for further   evaluation. Repeat CT scan done 8/30/2022  Impression   Chronic changes with stable left lower lobe 7 mm pulmonary nodule unchanged   from prior comparison 05/17/2022. Lung rads category 3 with recommended   follow-up within 6 months         Repeat CT scan done 3/7/2023  Impression   1. Left lower lobe lung nodule measuring 1.3 x 1.7 cm enlarged from prior   study and is highly concerning for lung malignancy. 2. No mediastinal adenopathy. 3. Mild upper lobe emphysema. 4. Additional findings as above. Sent for needle biopsy however radiology could not reach the site    PET scan done 3/25/2023    Impression   1. The enlarging nodule in the medial left lower lobe is hypermetabolic and   suspicious for potential malignancy. 2. Focal anorectal uptake is nonspecific. Suggest correlation with endoscopy   if not recently performed to exclude an underlying lesion.

## 2023-03-28 NOTE — PATIENT INSTRUCTIONS
ASSESSMENT/PLAN:  1. Nodule of lower lobe of left lung  2. Former heavy cigarette smoker (20-39 per day)  3. Sinusitis, unspecified chronicity, unspecified location  4. Hypersomnolence  5. Insomnia, unspecified type        Lung nodule  Low-dose CT scan done 10/28/2021     Impression   New noncalcified nodule in the left lower lobe measuring 7 mm. There is mild   underlying emphysema. Severe coronary artery calcification       LUNG RADS:   Per ACR Lung-RADS Version 1.1       Category 4A,   This is the 7 mm nodule                Repeat CT scan done 5/17/2022    Impression   1. Unchanged solid subcentimeter bilateral pulmonary nodules including a 9 mm   solid left lower lobe pulmonary nodule. Recommend PET-CT for further   evaluation. Repeat CT scan done 8/30/2022  Impression   Chronic changes with stable left lower lobe 7 mm pulmonary nodule unchanged   from prior comparison 05/17/2022. Lung rads category 3 with recommended   follow-up within 6 months         Repeat CT scan done 3/7/2023  Impression   1. Left lower lobe lung nodule measuring 1.3 x 1.7 cm enlarged from prior   study and is highly concerning for lung malignancy. 2. No mediastinal adenopathy. 3. Mild upper lobe emphysema. 4. Additional findings as above. Sent for needle biopsy however radiology could not reach the site    PET scan done 3/25/2023    Impression   1. The enlarging nodule in the medial left lower lobe is hypermetabolic and   suspicious for potential malignancy. 2. Focal anorectal uptake is nonspecific. Suggest correlation with endoscopy   if not recently performed to exclude an underlying lesion. Options at this point  1. Navigational bronchoscopy  2.   Surgical resection of the lung nodule    Went over the above options  And will send to CT surgery for resection        May need to see Dr. Marii Darnell about the colonoscopy to look at rectal lesion        Sinusitis  Continue with:  Nasal atrovent 0.06% and 1-2

## 2023-03-28 NOTE — PROGRESS NOTES
MA Communication:   The following orders are received by verbal communication from Greg Restrepo MD    Orders include:  Referral given to pt       6 mo fu scheduled 9/27/23
stridor. No wheezing or rales. Comments: Dec bs bl  Abdominal:      General: Abdomen is flat. Bowel sounds are normal. There is no distension. Tenderness: There is no abdominal tenderness. There is no guarding. Musculoskeletal:         General: No swelling or tenderness. Normal range of motion. Cervical back: Normal range of motion and neck supple. No rigidity. Skin:     General: Skin is warm and dry. Coloration: Skin is not jaundiced. Neurological:      General: No focal deficit present. Mental Status: She is alert and oriented to person, place, and time. Mental status is at baseline. Cranial Nerves: No cranial nerve deficit. Sensory: No sensory deficit. Motor: No weakness. Gait: Gait normal.   Psychiatric:         Mood and Affect: Mood normal.         Thought Content:  Thought content normal.         Judgment: Judgment normal.                An electronic signature was used to authenticate this note.    --Adriel Robbins MD

## 2023-04-04 ENCOUNTER — OFFICE VISIT (OUTPATIENT)
Dept: FAMILY MEDICINE CLINIC | Age: 83
End: 2023-04-04

## 2023-04-04 VITALS
SYSTOLIC BLOOD PRESSURE: 138 MMHG | HEIGHT: 59 IN | OXYGEN SATURATION: 97 % | DIASTOLIC BLOOD PRESSURE: 60 MMHG | BODY MASS INDEX: 33.51 KG/M2 | HEART RATE: 74 BPM | WEIGHT: 166.2 LBS

## 2023-04-04 DIAGNOSIS — I35.0 AORTIC VALVE STENOSIS, ETIOLOGY OF CARDIAC VALVE DISEASE UNSPECIFIED: ICD-10-CM

## 2023-04-04 DIAGNOSIS — E11.69 TYPE 2 DIABETES MELLITUS WITH OTHER SPECIFIED COMPLICATION, WITHOUT LONG-TERM CURRENT USE OF INSULIN (HCC): Primary | ICD-10-CM

## 2023-04-04 DIAGNOSIS — F11.20 OPIOID DEPENDENCE WITH CURRENT USE (HCC): ICD-10-CM

## 2023-04-04 DIAGNOSIS — E78.5 DYSLIPIDEMIA WITH HIGH LDL AND LOW HDL: ICD-10-CM

## 2023-04-04 DIAGNOSIS — J44.9 CHRONIC OBSTRUCTIVE PULMONARY DISEASE, UNSPECIFIED COPD TYPE (HCC): ICD-10-CM

## 2023-04-04 DIAGNOSIS — I10 ESSENTIAL HYPERTENSION: ICD-10-CM

## 2023-04-04 DIAGNOSIS — I25.84 CORONARY ARTERY DISEASE DUE TO CALCIFIED CORONARY LESION: ICD-10-CM

## 2023-04-04 DIAGNOSIS — F32.89 OTHER DEPRESSION: ICD-10-CM

## 2023-04-04 DIAGNOSIS — I25.10 CORONARY ARTERY DISEASE DUE TO CALCIFIED CORONARY LESION: ICD-10-CM

## 2023-04-04 NOTE — PROGRESS NOTES
Chayo Galeana presents for   Chief Complaint   Patient presents with    Hypertension     Pt states that she is here for a 3 month f/u, is not fasting for     Diabetes        ASSESSMENT:   Diagnosis Orders   1. Type 2 diabetes mellitus with other specified complication, without long-term current use of insulin ([facility] [address] A1c is pending. For now continue metformin at 1000 mg twice a day and Farxiga 10 mg a day Hemoglobin A1C      2. Opioid dependence with current use [facility]), plan for chronic pain management       3. Essential hypertension, controlled on losartan/HCTZ 100/12.5       4. Coronary artery disease due to calcified coronary lesion, continue cardiology follow-up, 81 mg aspirin 6 statin       5. Chronic obstructive pulmonary disease, unspecified COPD type ([facility] [address] plan per pulmonology       6. Aortic valve stenosis, etiology of cardiac valve disease unspecified, continue cardiology follow-up       7. Dyslipidemia with high LDL and low HDL, controlled continue Lipitor 40 mg       8. Other depression, controlled continue Lexapro 20 mg            Plan:  1)  Medication: continue current medication regimen unchanged, medications are working and tolerated, continue as listed  2)  Recheck in 3 months, sooner should new symptoms or problems arise. 3) LLR          SUBJECTIVE:  Chayo Galeana is a [de-identified] y.o. female who presents for evaluation of diabetes, CAD, COPD, aortic valve stenosis, depression, hypertension and hyperlipidemia. She indicates that she is feeling well and denies any symptoms referable to her elevated blood pressure. Specifically denies chest pain, palpitations,  orthopnea, PND or peripheral edema or neuro sx. No anorexia, arthralgia, or leg cramps noted. Current medication regimen is as listed below. She denies any side effects of medication, and has been taking it regularly.    Lab Results   Component Value Date    LDLCALC 59 12/27/2022    LDLDIRECT 123 (H) 01/03/2018       Patient is here for 3-month

## 2023-04-05 LAB
EST. AVERAGE GLUCOSE BLD GHB EST-MCNC: 151.3 MG/DL
HBA1C MFR BLD: 6.9 %

## 2023-04-07 ENCOUNTER — TELEPHONE (OUTPATIENT)
Dept: CARDIOLOGY CLINIC | Age: 83
End: 2023-04-07

## 2023-04-13 ENCOUNTER — HOSPITAL ENCOUNTER (OUTPATIENT)
Dept: PREADMISSION TESTING | Age: 83
Discharge: HOME | End: 2023-04-17
Payer: MEDICARE

## 2023-04-13 LAB
ABO + RH BLD: NORMAL
ANION GAP SERPL CALCULATED.3IONS-SCNC: 13 MMOL/L (ref 3–16)
BILIRUB UR QL STRIP.AUTO: NEGATIVE
BLD GP AB SCN SERPL QL: NORMAL
BUN SERPL-MCNC: 19 MG/DL (ref 7–20)
CALCIUM SERPL-MCNC: 10.1 MG/DL (ref 8.3–10.6)
CHLORIDE SERPL-SCNC: 100 MMOL/L (ref 99–110)
CLARITY UR: CLEAR
CO2 SERPL-SCNC: 28 MMOL/L (ref 21–32)
COLOR UR: YELLOW
CREAT SERPL-MCNC: <0.5 MG/DL (ref 0.6–1.2)
DEPRECATED RDW RBC AUTO: 14.6 % (ref 12.4–15.4)
GFR SERPLBLD CREATININE-BSD FMLA CKD-EPI: >60 ML/MIN/{1.73_M2}
GLUCOSE SERPL-MCNC: 130 MG/DL (ref 70–99)
GLUCOSE UR STRIP.AUTO-MCNC: >=1000 MG/DL
HCT VFR BLD AUTO: 41.2 % (ref 36–48)
HGB BLD-MCNC: 13.8 G/DL (ref 12–16)
HGB UR QL STRIP.AUTO: NEGATIVE
KETONES UR STRIP.AUTO-MCNC: ABNORMAL MG/DL
LEUKOCYTE ESTERASE UR QL STRIP.AUTO: NEGATIVE
MCH RBC QN AUTO: 28.4 PG (ref 26–34)
MCHC RBC AUTO-ENTMCNC: 33.6 G/DL (ref 31–36)
MCV RBC AUTO: 84.7 FL (ref 80–100)
NITRITE UR QL STRIP.AUTO: NEGATIVE
PH UR STRIP.AUTO: 5.5 [PH] (ref 5–8)
PLATELET # BLD AUTO: 259 K/UL (ref 135–450)
PMV BLD AUTO: 7.5 FL (ref 5–10.5)
POTASSIUM SERPL-SCNC: 3.8 MMOL/L (ref 3.5–5.1)
PROT UR STRIP.AUTO-MCNC: NEGATIVE MG/DL
RBC # BLD AUTO: 4.86 M/UL (ref 4–5.2)
SODIUM SERPL-SCNC: 141 MMOL/L (ref 136–145)
SP GR UR STRIP.AUTO: 1.02 (ref 1–1.03)
UA COMPLETE W REFLEX CULTURE PNL UR: ABNORMAL
UA DIPSTICK W REFLEX MICRO PNL UR: ABNORMAL
URN SPEC COLLECT METH UR: ABNORMAL
UROBILINOGEN UR STRIP-ACNC: 0.2 E.U./DL
WBC # BLD AUTO: 8.8 K/UL (ref 4–11)

## 2023-04-13 PROCEDURE — 85027 COMPLETE CBC AUTOMATED: CPT

## 2023-04-13 PROCEDURE — 80048 BASIC METABOLIC PNL TOTAL CA: CPT

## 2023-04-13 PROCEDURE — 36415 COLL VENOUS BLD VENIPUNCTURE: CPT

## 2023-04-13 PROCEDURE — 86900 BLOOD TYPING SEROLOGIC ABO: CPT

## 2023-04-13 PROCEDURE — 86901 BLOOD TYPING SEROLOGIC RH(D): CPT

## 2023-04-13 PROCEDURE — 81003 URINALYSIS AUTO W/O SCOPE: CPT

## 2023-04-13 PROCEDURE — 86850 RBC ANTIBODY SCREEN: CPT

## 2023-04-17 NOTE — PROGRESS NOTES
personally reviewed by me in its entirety. I confirm that the note above accurately reflects all work, treatment, procedures, and medical decision making performed by me. Yisel Gaines MD, personally performed the services described in this documentation as scribed by  Bryanna Stokes RN in my presence, and it is both accurate and complete to the best of our ability. I will address the patient's cardiac risk factors and adjusted pharmacologic treatment as needed. In addition, I have reinforced the need for patient directed risk factor modification. All questions and concerns were addressed to the patient/family. Alternatives to my treatment were discussed. Patient verbalized understanding. Thank you for allowing us to participate in the care of this patient.     Clotilde Youssef MD, [address] [facility]  [phone] [facility]  [phone] [facility]

## 2023-04-18 ENCOUNTER — OFFICE VISIT (OUTPATIENT)
Dept: CARDIOLOGY CLINIC | Age: 83
End: 2023-04-18
Payer: MEDICARE

## 2023-04-18 VITALS
OXYGEN SATURATION: 93 % | BODY MASS INDEX: 34.37 KG/M2 | SYSTOLIC BLOOD PRESSURE: 122 MMHG | DIASTOLIC BLOOD PRESSURE: 78 MMHG | WEIGHT: 170.5 LBS | HEART RATE: 64 BPM | HEIGHT: 59 IN

## 2023-04-18 DIAGNOSIS — Z01.810 PREOPERATIVE CARDIOVASCULAR EXAMINATION: Primary | ICD-10-CM

## 2023-04-18 DIAGNOSIS — I25.84 CORONARY ARTERY DISEASE DUE TO CALCIFIED CORONARY LESION: ICD-10-CM

## 2023-04-18 DIAGNOSIS — I35.1 MILD AORTIC REGURGITATION: ICD-10-CM

## 2023-04-18 DIAGNOSIS — I35.0 NONRHEUMATIC AORTIC VALVE STENOSIS: ICD-10-CM

## 2023-04-18 DIAGNOSIS — I25.10 CORONARY ARTERY DISEASE DUE TO CALCIFIED CORONARY LESION: ICD-10-CM

## 2023-04-18 PROCEDURE — G8427 DOCREV CUR MEDS BY ELIG CLIN: HCPCS | Performed by: INTERNAL MEDICINE

## 2023-04-18 PROCEDURE — G8417 CALC BMI ABV UP PARAM F/U: HCPCS | Performed by: INTERNAL MEDICINE

## 2023-04-18 PROCEDURE — 3074F SYST BP LT 130 MM HG: CPT | Performed by: INTERNAL MEDICINE

## 2023-04-18 PROCEDURE — 1036F TOBACCO NON-USER: CPT | Performed by: INTERNAL MEDICINE

## 2023-04-18 PROCEDURE — 1090F PRES/ABSN URINE INCON ASSESS: CPT | Performed by: INTERNAL MEDICINE

## 2023-04-18 PROCEDURE — 3078F DIAST BP <80 MM HG: CPT | Performed by: INTERNAL MEDICINE

## 2023-04-18 PROCEDURE — 99214 OFFICE O/P EST MOD 30 MIN: CPT | Performed by: INTERNAL MEDICINE

## 2023-04-18 PROCEDURE — G8400 PT W/DXA NO RESULTS DOC: HCPCS | Performed by: INTERNAL MEDICINE

## 2023-04-18 PROCEDURE — 1123F ACP DISCUSS/DSCN MKR DOCD: CPT | Performed by: INTERNAL MEDICINE

## 2023-04-18 NOTE — PATIENT INSTRUCTIONS
PLAN:  The patient is intermediate risk for major adverse cardiac events for noncardiac surgery. May proceed to the operating room at the discretion of the attending surgeon. Please call with any questions or concerns that may arise. 2.   No change in medications today  3. No cardiac testing warranted at this time. Will repeat echocardiogram in April 2024.

## 2023-04-19 ENCOUNTER — ANESTHESIA EVENT (OUTPATIENT)
Dept: OPERATING ROOM | Age: 83
End: 2023-04-19
Payer: MEDICARE

## 2023-04-20 ENCOUNTER — HOSPITAL ENCOUNTER (INPATIENT)
Age: 83
LOS: 4 days | Discharge: HOME HEALTH | DRG: 165 | End: 2023-04-24
Attending: THORACIC SURGERY (CARDIOTHORACIC VASCULAR SURGERY) | Admitting: THORACIC SURGERY (CARDIOTHORACIC VASCULAR SURGERY)
Payer: MEDICARE

## 2023-04-20 ENCOUNTER — APPOINTMENT (OUTPATIENT)
Dept: GENERAL RADIOLOGY | Age: 83
DRG: 165 | End: 2023-04-20
Attending: THORACIC SURGERY (CARDIOTHORACIC VASCULAR SURGERY)
Payer: MEDICARE

## 2023-04-20 ENCOUNTER — ANESTHESIA (OUTPATIENT)
Dept: OPERATING ROOM | Age: 83
End: 2023-04-20
Payer: MEDICARE

## 2023-04-20 DIAGNOSIS — R91.1 LEFT LOWER LOBE PULMONARY NODULE: Primary | ICD-10-CM

## 2023-04-20 DIAGNOSIS — R91.1 LUNG NODULE: ICD-10-CM

## 2023-04-20 LAB
ABO + RH BLD: NORMAL
BLD GP AB SCN SERPL QL: NORMAL
GLUCOSE BLD-MCNC: 105 MG/DL (ref 70–99)
GLUCOSE BLD-MCNC: 163 MG/DL (ref 70–99)
PERFORMED ON: ABNORMAL
PERFORMED ON: ABNORMAL

## 2023-04-20 PROCEDURE — 2580000003 HC RX 258: Performed by: ANESTHESIOLOGY

## 2023-04-20 PROCEDURE — 86900 BLOOD TYPING SEROLOGIC ABO: CPT

## 2023-04-20 PROCEDURE — 2500000003 HC RX 250 WO HCPCS: Performed by: THORACIC SURGERY (CARDIOTHORACIC VASCULAR SURGERY)

## 2023-04-20 PROCEDURE — 6360000002 HC RX W HCPCS

## 2023-04-20 PROCEDURE — 0BTJ4ZZ RESECTION OF LEFT LOWER LUNG LOBE, PERCUTANEOUS ENDOSCOPIC APPROACH: ICD-10-PCS

## 2023-04-20 PROCEDURE — C1729 CATH, DRAINAGE: HCPCS | Performed by: THORACIC SURGERY (CARDIOTHORACIC VASCULAR SURGERY)

## 2023-04-20 PROCEDURE — 3700000001 HC ADD 15 MINUTES (ANESTHESIA): Performed by: THORACIC SURGERY (CARDIOTHORACIC VASCULAR SURGERY)

## 2023-04-20 PROCEDURE — 88341 IMHCHEM/IMCYTCHM EA ADD ANTB: CPT

## 2023-04-20 PROCEDURE — 6360000002 HC RX W HCPCS: Performed by: THORACIC SURGERY (CARDIOTHORACIC VASCULAR SURGERY)

## 2023-04-20 PROCEDURE — 2500000003 HC RX 250 WO HCPCS

## 2023-04-20 PROCEDURE — 2000000000 HC ICU R&B

## 2023-04-20 PROCEDURE — 6360000002 HC RX W HCPCS: Performed by: NURSE ANESTHETIST, CERTIFIED REGISTERED

## 2023-04-20 PROCEDURE — 88309 TISSUE EXAM BY PATHOLOGIST: CPT

## 2023-04-20 PROCEDURE — C9290 INJ, BUPIVACAINE LIPOSOME: HCPCS | Performed by: THORACIC SURGERY (CARDIOTHORACIC VASCULAR SURGERY)

## 2023-04-20 PROCEDURE — 86901 BLOOD TYPING SEROLOGIC RH(D): CPT

## 2023-04-20 PROCEDURE — 0BBJ4ZZ EXCISION OF LEFT LOWER LUNG LOBE, PERCUTANEOUS ENDOSCOPIC APPROACH: ICD-10-PCS

## 2023-04-20 PROCEDURE — 71045 X-RAY EXAM CHEST 1 VIEW: CPT

## 2023-04-20 PROCEDURE — 3600000016 HC SURGERY LEVEL 6 ADDTL 15MIN: Performed by: THORACIC SURGERY (CARDIOTHORACIC VASCULAR SURGERY)

## 2023-04-20 PROCEDURE — 88305 TISSUE EXAM BY PATHOLOGIST: CPT

## 2023-04-20 PROCEDURE — 2500000003 HC RX 250 WO HCPCS: Performed by: NURSE ANESTHETIST, CERTIFIED REGISTERED

## 2023-04-20 PROCEDURE — 86850 RBC ANTIBODY SCREEN: CPT

## 2023-04-20 PROCEDURE — 88342 IMHCHEM/IMCYTCHM 1ST ANTB: CPT

## 2023-04-20 PROCEDURE — 3E0T3BZ INTRODUCTION OF ANESTHETIC AGENT INTO PERIPHERAL NERVES AND PLEXI, PERCUTANEOUS APPROACH: ICD-10-PCS

## 2023-04-20 PROCEDURE — 3700000000 HC ANESTHESIA ATTENDED CARE: Performed by: THORACIC SURGERY (CARDIOTHORACIC VASCULAR SURGERY)

## 2023-04-20 PROCEDURE — 94640 AIRWAY INHALATION TREATMENT: CPT

## 2023-04-20 PROCEDURE — 2700000000 HC OXYGEN THERAPY PER DAY

## 2023-04-20 PROCEDURE — 3600000006 HC SURGERY LEVEL 6 BASE: Performed by: THORACIC SURGERY (CARDIOTHORACIC VASCULAR SURGERY)

## 2023-04-20 PROCEDURE — 2720000010 HC SURG SUPPLY STERILE: Performed by: THORACIC SURGERY (CARDIOTHORACIC VASCULAR SURGERY)

## 2023-04-20 PROCEDURE — 88312 SPECIAL STAINS GROUP 1: CPT

## 2023-04-20 PROCEDURE — 6370000000 HC RX 637 (ALT 250 FOR IP): Performed by: THORACIC SURGERY (CARDIOTHORACIC VASCULAR SURGERY)

## 2023-04-20 PROCEDURE — 88307 TISSUE EXAM BY PATHOLOGIST: CPT

## 2023-04-20 PROCEDURE — 94669 MECHANICAL CHEST WALL OSCILL: CPT

## 2023-04-20 PROCEDURE — 6370000000 HC RX 637 (ALT 250 FOR IP)

## 2023-04-20 PROCEDURE — 88331 PATH CONSLTJ SURG 1 BLK 1SPC: CPT

## 2023-04-20 PROCEDURE — 94761 N-INVAS EAR/PLS OXIMETRY MLT: CPT

## 2023-04-20 PROCEDURE — C2618 PROBE/NEEDLE, CRYO: HCPCS | Performed by: THORACIC SURGERY (CARDIOTHORACIC VASCULAR SURGERY)

## 2023-04-20 PROCEDURE — 2580000003 HC RX 258

## 2023-04-20 PROCEDURE — 2709999900 HC NON-CHARGEABLE SUPPLY: Performed by: THORACIC SURGERY (CARDIOTHORACIC VASCULAR SURGERY)

## 2023-04-20 RX ORDER — DEXAMETHASONE SODIUM PHOSPHATE 4 MG/ML
INJECTION, SOLUTION INTRA-ARTICULAR; INTRALESIONAL; INTRAMUSCULAR; INTRAVENOUS; SOFT TISSUE PRN
Status: DISCONTINUED | OUTPATIENT
Start: 2023-04-20 | End: 2023-04-20 | Stop reason: SDUPTHER

## 2023-04-20 RX ORDER — METOPROLOL TARTRATE 5 MG/5ML
2.5 INJECTION INTRAVENOUS EVERY 6 HOURS
Status: COMPLETED | OUTPATIENT
Start: 2023-04-20 | End: 2023-04-20

## 2023-04-20 RX ORDER — ACETAMINOPHEN 500 MG
1000 TABLET ORAL ONCE
Status: COMPLETED | OUTPATIENT
Start: 2023-04-20 | End: 2023-04-20

## 2023-04-20 RX ORDER — SODIUM CHLORIDE 9 MG/ML
25 INJECTION, SOLUTION INTRAVENOUS PRN
Status: DISCONTINUED | OUTPATIENT
Start: 2023-04-20 | End: 2023-04-21

## 2023-04-20 RX ORDER — BISACODYL 5 MG/1
5 TABLET, DELAYED RELEASE ORAL DAILY PRN
Status: DISCONTINUED | OUTPATIENT
Start: 2023-04-20 | End: 2023-04-24 | Stop reason: HOSPADM

## 2023-04-20 RX ORDER — ACETYLCYSTEINE 100 MG/ML
4 SOLUTION ORAL; RESPIRATORY (INHALATION) EVERY 4 HOURS
Status: DISCONTINUED | OUTPATIENT
Start: 2023-04-20 | End: 2023-04-21

## 2023-04-20 RX ORDER — METHOCARBAMOL 500 MG/1
750 TABLET, FILM COATED ORAL 4 TIMES DAILY
Status: DISCONTINUED | OUTPATIENT
Start: 2023-04-20 | End: 2023-04-24 | Stop reason: HOSPADM

## 2023-04-20 RX ORDER — FAMOTIDINE 10 MG/ML
20 INJECTION, SOLUTION INTRAVENOUS 2 TIMES DAILY
Status: DISCONTINUED | OUTPATIENT
Start: 2023-04-20 | End: 2023-04-24

## 2023-04-20 RX ORDER — DIPHENHYDRAMINE HYDROCHLORIDE 50 MG/ML
12.5 INJECTION INTRAMUSCULAR; INTRAVENOUS
Status: DISCONTINUED | OUTPATIENT
Start: 2023-04-20 | End: 2023-04-21

## 2023-04-20 RX ORDER — SODIUM CHLORIDE, SODIUM LACTATE, POTASSIUM CHLORIDE, CALCIUM CHLORIDE 600; 310; 30; 20 MG/100ML; MG/100ML; MG/100ML; MG/100ML
INJECTION, SOLUTION INTRAVENOUS CONTINUOUS
Status: DISCONTINUED | OUTPATIENT
Start: 2023-04-20 | End: 2023-04-20

## 2023-04-20 RX ORDER — GINSENG 100 MG
CAPSULE ORAL DAILY
Status: DISCONTINUED | OUTPATIENT
Start: 2023-04-20 | End: 2023-04-24 | Stop reason: HOSPADM

## 2023-04-20 RX ORDER — LIDOCAINE HYDROCHLORIDE 20 MG/ML
INJECTION, SOLUTION EPIDURAL; INFILTRATION; INTRACAUDAL; PERINEURAL PRN
Status: DISCONTINUED | OUTPATIENT
Start: 2023-04-20 | End: 2023-04-20 | Stop reason: SDUPTHER

## 2023-04-20 RX ORDER — OXYCODONE HYDROCHLORIDE 5 MG/1
5 TABLET ORAL EVERY 4 HOURS PRN
Status: DISCONTINUED | OUTPATIENT
Start: 2023-04-20 | End: 2023-04-24 | Stop reason: HOSPADM

## 2023-04-20 RX ORDER — ROCURONIUM BROMIDE 10 MG/ML
INJECTION, SOLUTION INTRAVENOUS PRN
Status: DISCONTINUED | OUTPATIENT
Start: 2023-04-20 | End: 2023-04-20 | Stop reason: SDUPTHER

## 2023-04-20 RX ORDER — ALBUTEROL SULFATE 2.5 MG/3ML
2.5 SOLUTION RESPIRATORY (INHALATION)
Status: DISCONTINUED | OUTPATIENT
Start: 2023-04-20 | End: 2023-04-24 | Stop reason: HOSPADM

## 2023-04-20 RX ORDER — MORPHINE SULFATE 2 MG/ML
2 INJECTION, SOLUTION INTRAMUSCULAR; INTRAVENOUS
Status: DISCONTINUED | OUTPATIENT
Start: 2023-04-20 | End: 2023-04-24 | Stop reason: HOSPADM

## 2023-04-20 RX ORDER — GABAPENTIN 300 MG/1
300 CAPSULE ORAL 3 TIMES DAILY
Status: DISCONTINUED | OUTPATIENT
Start: 2023-04-20 | End: 2023-04-24 | Stop reason: HOSPADM

## 2023-04-20 RX ORDER — AMIODARONE HYDROCHLORIDE 200 MG/1
200 TABLET ORAL DAILY
Status: DISCONTINUED | OUTPATIENT
Start: 2023-04-20 | End: 2023-04-24 | Stop reason: HOSPADM

## 2023-04-20 RX ORDER — ONDANSETRON 2 MG/ML
4 INJECTION INTRAMUSCULAR; INTRAVENOUS
Status: DISCONTINUED | OUTPATIENT
Start: 2023-04-20 | End: 2023-04-21 | Stop reason: SDUPTHER

## 2023-04-20 RX ORDER — MEPERIDINE HYDROCHLORIDE 50 MG/ML
12.5 INJECTION INTRAMUSCULAR; INTRAVENOUS; SUBCUTANEOUS EVERY 5 MIN PRN
Status: DISCONTINUED | OUTPATIENT
Start: 2023-04-20 | End: 2023-04-21

## 2023-04-20 RX ORDER — SODIUM CHLORIDE 0.9 % (FLUSH) 0.9 %
5-40 SYRINGE (ML) INJECTION EVERY 12 HOURS SCHEDULED
Status: DISCONTINUED | OUTPATIENT
Start: 2023-04-20 | End: 2023-04-20 | Stop reason: HOSPADM

## 2023-04-20 RX ORDER — MIDAZOLAM HYDROCHLORIDE 1 MG/ML
2 INJECTION INTRAMUSCULAR; INTRAVENOUS
Status: DISCONTINUED | OUTPATIENT
Start: 2023-04-20 | End: 2023-04-20 | Stop reason: HOSPADM

## 2023-04-20 RX ORDER — ACETAMINOPHEN 500 MG
1000 TABLET ORAL EVERY 6 HOURS
Status: DISCONTINUED | OUTPATIENT
Start: 2023-04-20 | End: 2023-04-24 | Stop reason: HOSPADM

## 2023-04-20 RX ORDER — PHENYLEPHRINE HCL IN 0.9% NACL 1 MG/10 ML
SYRINGE (ML) INTRAVENOUS PRN
Status: DISCONTINUED | OUTPATIENT
Start: 2023-04-20 | End: 2023-04-20 | Stop reason: SDUPTHER

## 2023-04-20 RX ORDER — OXYCODONE HYDROCHLORIDE 5 MG/1
10 TABLET ORAL PRN
Status: ACTIVE | OUTPATIENT
Start: 2023-04-20 | End: 2023-04-20

## 2023-04-20 RX ORDER — SODIUM CHLORIDE 9 MG/ML
INJECTION, SOLUTION INTRAVENOUS PRN
Status: DISCONTINUED | OUTPATIENT
Start: 2023-04-20 | End: 2023-04-24 | Stop reason: HOSPADM

## 2023-04-20 RX ORDER — SODIUM CHLORIDE 9 MG/ML
INJECTION, SOLUTION INTRAVENOUS PRN
Status: DISCONTINUED | OUTPATIENT
Start: 2023-04-20 | End: 2023-04-20 | Stop reason: HOSPADM

## 2023-04-20 RX ORDER — LIDOCAINE HYDROCHLORIDE 10 MG/ML
1 INJECTION, SOLUTION EPIDURAL; INFILTRATION; INTRACAUDAL; PERINEURAL
Status: DISCONTINUED | OUTPATIENT
Start: 2023-04-20 | End: 2023-04-20 | Stop reason: HOSPADM

## 2023-04-20 RX ORDER — ONDANSETRON 2 MG/ML
INJECTION INTRAMUSCULAR; INTRAVENOUS PRN
Status: DISCONTINUED | OUTPATIENT
Start: 2023-04-20 | End: 2023-04-20 | Stop reason: SDUPTHER

## 2023-04-20 RX ORDER — FAMOTIDINE 20 MG/1
20 TABLET, FILM COATED ORAL 2 TIMES DAILY
Status: DISCONTINUED | OUTPATIENT
Start: 2023-04-20 | End: 2023-04-24 | Stop reason: HOSPADM

## 2023-04-20 RX ORDER — PROPOFOL 10 MG/ML
INJECTION, EMULSION INTRAVENOUS PRN
Status: DISCONTINUED | OUTPATIENT
Start: 2023-04-20 | End: 2023-04-20 | Stop reason: SDUPTHER

## 2023-04-20 RX ORDER — SODIUM CHLORIDE 0.9 % (FLUSH) 0.9 %
5-40 SYRINGE (ML) INJECTION EVERY 12 HOURS SCHEDULED
Status: DISCONTINUED | OUTPATIENT
Start: 2023-04-20 | End: 2023-04-24

## 2023-04-20 RX ORDER — FENTANYL CITRATE 50 UG/ML
INJECTION, SOLUTION INTRAMUSCULAR; INTRAVENOUS PRN
Status: DISCONTINUED | OUTPATIENT
Start: 2023-04-20 | End: 2023-04-20 | Stop reason: SDUPTHER

## 2023-04-20 RX ORDER — SODIUM CHLORIDE 0.9 % (FLUSH) 0.9 %
5-40 SYRINGE (ML) INJECTION PRN
Status: DISCONTINUED | OUTPATIENT
Start: 2023-04-20 | End: 2023-04-20 | Stop reason: HOSPADM

## 2023-04-20 RX ORDER — METOPROLOL TARTRATE 5 MG/5ML
2.5 INJECTION INTRAVENOUS EVERY 6 HOURS
Status: DISCONTINUED | OUTPATIENT
Start: 2023-04-20 | End: 2023-04-20

## 2023-04-20 RX ORDER — ESCITALOPRAM OXALATE 10 MG/1
20 TABLET ORAL DAILY
Status: DISCONTINUED | OUTPATIENT
Start: 2023-04-20 | End: 2023-04-24 | Stop reason: HOSPADM

## 2023-04-20 RX ORDER — ENOXAPARIN SODIUM 100 MG/ML
40 INJECTION SUBCUTANEOUS DAILY
Status: DISCONTINUED | OUTPATIENT
Start: 2023-04-20 | End: 2023-04-24 | Stop reason: HOSPADM

## 2023-04-20 RX ORDER — ONDANSETRON 2 MG/ML
4 INJECTION INTRAMUSCULAR; INTRAVENOUS EVERY 6 HOURS PRN
Status: DISCONTINUED | OUTPATIENT
Start: 2023-04-20 | End: 2023-04-24 | Stop reason: HOSPADM

## 2023-04-20 RX ORDER — LABETALOL HYDROCHLORIDE 5 MG/ML
10 INJECTION, SOLUTION INTRAVENOUS
Status: DISCONTINUED | OUTPATIENT
Start: 2023-04-20 | End: 2023-04-20

## 2023-04-20 RX ORDER — SODIUM CHLORIDE 0.9 % (FLUSH) 0.9 %
5-40 SYRINGE (ML) INJECTION PRN
Status: DISCONTINUED | OUTPATIENT
Start: 2023-04-20 | End: 2023-04-24 | Stop reason: HOSPADM

## 2023-04-20 RX ORDER — OXYCODONE HYDROCHLORIDE 5 MG/1
5 TABLET ORAL PRN
Status: ACTIVE | OUTPATIENT
Start: 2023-04-20 | End: 2023-04-20

## 2023-04-20 RX ORDER — CEFAZOLIN SODIUM IN 0.9 % NACL 2 G/100 ML
2000 PLASTIC BAG, INJECTION (ML) INTRAVENOUS
Status: COMPLETED | OUTPATIENT
Start: 2023-04-20 | End: 2023-04-20

## 2023-04-20 RX ORDER — EPHEDRINE SULFATE 50 MG/ML
INJECTION INTRAVENOUS PRN
Status: DISCONTINUED | OUTPATIENT
Start: 2023-04-20 | End: 2023-04-20 | Stop reason: SDUPTHER

## 2023-04-20 RX ORDER — METOPROLOL TARTRATE 5 MG/5ML
INJECTION INTRAVENOUS
Status: COMPLETED
Start: 2023-04-20 | End: 2023-04-20

## 2023-04-20 RX ORDER — SODIUM CHLORIDE 0.9 % (FLUSH) 0.9 %
5-40 SYRINGE (ML) INJECTION EVERY 12 HOURS SCHEDULED
Status: DISCONTINUED | OUTPATIENT
Start: 2023-04-20 | End: 2023-04-24 | Stop reason: HOSPADM

## 2023-04-20 RX ORDER — SODIUM CHLORIDE 0.9 % (FLUSH) 0.9 %
5-40 SYRINGE (ML) INJECTION PRN
Status: DISCONTINUED | OUTPATIENT
Start: 2023-04-20 | End: 2023-04-21

## 2023-04-20 RX ORDER — AMLODIPINE BESYLATE 5 MG/1
5 TABLET ORAL DAILY
Status: DISCONTINUED | OUTPATIENT
Start: 2023-04-20 | End: 2023-04-24 | Stop reason: HOSPADM

## 2023-04-20 RX ORDER — GLYCOPYRROLATE 0.2 MG/ML
INJECTION INTRAMUSCULAR; INTRAVENOUS PRN
Status: DISCONTINUED | OUTPATIENT
Start: 2023-04-20 | End: 2023-04-20 | Stop reason: SDUPTHER

## 2023-04-20 RX ORDER — EPINEPHRINE 1 MG/ML
INJECTION, SOLUTION, CONCENTRATE INTRAVENOUS PRN
Status: DISCONTINUED | OUTPATIENT
Start: 2023-04-20 | End: 2023-04-20 | Stop reason: SDUPTHER

## 2023-04-20 RX ORDER — MORPHINE SULFATE 4 MG/ML
4 INJECTION, SOLUTION INTRAMUSCULAR; INTRAVENOUS
Status: DISCONTINUED | OUTPATIENT
Start: 2023-04-20 | End: 2023-04-24 | Stop reason: HOSPADM

## 2023-04-20 RX ADMIN — ACETAMINOPHEN 1000 MG: 500 TABLET ORAL at 12:02

## 2023-04-20 RX ADMIN — FENTANYL CITRATE 25 MCG: 50 INJECTION, SOLUTION INTRAMUSCULAR; INTRAVENOUS at 13:07

## 2023-04-20 RX ADMIN — Medication 2000 MG: at 13:03

## 2023-04-20 RX ADMIN — ROCURONIUM BROMIDE 50 MG: 10 SOLUTION INTRAVENOUS at 13:08

## 2023-04-20 RX ADMIN — FENTANYL CITRATE 25 MCG: 50 INJECTION, SOLUTION INTRAMUSCULAR; INTRAVENOUS at 14:35

## 2023-04-20 RX ADMIN — MORPHINE SULFATE 4 MG: 4 INJECTION, SOLUTION INTRAMUSCULAR; INTRAVENOUS at 22:23

## 2023-04-20 RX ADMIN — PROPOFOL 100 MG: 10 INJECTION, EMULSION INTRAVENOUS at 13:07

## 2023-04-20 RX ADMIN — MORPHINE SULFATE 4 MG: 4 INJECTION, SOLUTION INTRAMUSCULAR; INTRAVENOUS at 16:55

## 2023-04-20 RX ADMIN — ROCURONIUM BROMIDE 10 MG: 10 SOLUTION INTRAVENOUS at 13:55

## 2023-04-20 RX ADMIN — GLYCOPYRROLATE 0.2 MG: 0.2 INJECTION, SOLUTION INTRAMUSCULAR; INTRAVENOUS at 13:52

## 2023-04-20 RX ADMIN — ALBUTEROL SULFATE 2.5 MG: 2.5 SOLUTION RESPIRATORY (INHALATION) at 21:02

## 2023-04-20 RX ADMIN — ROCURONIUM BROMIDE 10 MG: 10 SOLUTION INTRAVENOUS at 14:16

## 2023-04-20 RX ADMIN — FENTANYL CITRATE 25 MCG: 50 INJECTION, SOLUTION INTRAMUSCULAR; INTRAVENOUS at 13:55

## 2023-04-20 RX ADMIN — ONDANSETRON 4 MG: 2 INJECTION INTRAMUSCULAR; INTRAVENOUS at 22:24

## 2023-04-20 RX ADMIN — GABAPENTIN 300 MG: 300 CAPSULE ORAL at 21:16

## 2023-04-20 RX ADMIN — SUGAMMADEX 400 MG: 100 INJECTION, SOLUTION INTRAVENOUS at 15:00

## 2023-04-20 RX ADMIN — SODIUM CHLORIDE, SODIUM LACTATE, POTASSIUM CHLORIDE, AND CALCIUM CHLORIDE: .6; .31; .03; .02 INJECTION, SOLUTION INTRAVENOUS at 13:03

## 2023-04-20 RX ADMIN — LIDOCAINE HYDROCHLORIDE 60 MG: 20 INJECTION, SOLUTION EPIDURAL; INFILTRATION; INTRACAUDAL; PERINEURAL at 13:07

## 2023-04-20 RX ADMIN — ALBUTEROL SULFATE 2.5 MG: 2.5 SOLUTION RESPIRATORY (INHALATION) at 16:40

## 2023-04-20 RX ADMIN — METHOCARBAMOL 750 MG: 500 TABLET ORAL at 21:16

## 2023-04-20 RX ADMIN — METOPROLOL TARTRATE 2.5 MG: 5 INJECTION INTRAVENOUS at 15:30

## 2023-04-20 RX ADMIN — FAMOTIDINE 20 MG: 10 INJECTION, SOLUTION INTRAVENOUS at 21:16

## 2023-04-20 RX ADMIN — DEXAMETHASONE SODIUM PHOSPHATE 8 MG: 4 INJECTION, SOLUTION INTRAMUSCULAR; INTRAVENOUS at 13:25

## 2023-04-20 RX ADMIN — EPHEDRINE SULFATE 10 MG: 50 INJECTION INTRAVENOUS at 14:02

## 2023-04-20 RX ADMIN — ONDANSETRON 4 MG: 2 INJECTION INTRAMUSCULAR; INTRAVENOUS at 13:25

## 2023-04-20 RX ADMIN — Medication 50 MCG: at 14:21

## 2023-04-20 RX ADMIN — ACETYLCYSTEINE 400 MG: 100 SOLUTION ORAL; RESPIRATORY (INHALATION) at 21:02

## 2023-04-20 RX ADMIN — METOPROLOL TARTRATE 2.5 MG: 1 INJECTION, SOLUTION INTRAVENOUS at 15:30

## 2023-04-20 RX ADMIN — ACETAMINOPHEN 1000 MG: 500 TABLET ORAL at 23:58

## 2023-04-20 RX ADMIN — AMLODIPINE BESYLATE 5 MG: 5 TABLET ORAL at 16:34

## 2023-04-20 RX ADMIN — FENTANYL CITRATE 25 MCG: 50 INJECTION, SOLUTION INTRAMUSCULAR; INTRAVENOUS at 14:09

## 2023-04-20 RX ADMIN — EPINEPHRINE 5 MCG: 1 INJECTION, SOLUTION, CONCENTRATE INTRAVENOUS at 13:40

## 2023-04-20 RX ADMIN — EPINEPHRINE 5 MCG: 1 INJECTION, SOLUTION, CONCENTRATE INTRAVENOUS at 13:36

## 2023-04-20 RX ADMIN — Medication 50 MCG: at 14:27

## 2023-04-20 RX ADMIN — ACETYLCYSTEINE 400 MG: 100 SOLUTION ORAL; RESPIRATORY (INHALATION) at 16:41

## 2023-04-20 RX ADMIN — GABAPENTIN 300 MG: 300 CAPSULE ORAL at 16:34

## 2023-04-20 RX ADMIN — PROPOFOL 50 MG: 10 INJECTION, EMULSION INTRAVENOUS at 13:10

## 2023-04-20 RX ADMIN — Medication 10 ML: at 21:16

## 2023-04-20 RX ADMIN — Medication 50 MCG: at 14:02

## 2023-04-20 RX ADMIN — Medication 100 MCG: at 13:40

## 2023-04-20 RX ADMIN — ACETAMINOPHEN 1000 MG: 500 TABLET ORAL at 18:17

## 2023-04-20 RX ADMIN — Medication 10 ML: at 21:17

## 2023-04-20 RX ADMIN — Medication 100 MCG: at 13:36

## 2023-04-20 RX ADMIN — METHOCARBAMOL 750 MG: 500 TABLET ORAL at 16:17

## 2023-04-20 RX ADMIN — PROPOFOL 50 MG: 10 INJECTION, EMULSION INTRAVENOUS at 13:15

## 2023-04-20 RX ADMIN — AMIODARONE HYDROCHLORIDE 200 MG: 200 TABLET ORAL at 16:34

## 2023-04-20 ASSESSMENT — PAIN SCALES - GENERAL
PAINLEVEL_OUTOF10: 7
PAINLEVEL_OUTOF10: 4
PAINLEVEL_OUTOF10: 4
PAINLEVEL_OUTOF10: 9
PAINLEVEL_OUTOF10: 7
PAINLEVEL_OUTOF10: 10
PAINLEVEL_OUTOF10: 4

## 2023-04-20 ASSESSMENT — PAIN - FUNCTIONAL ASSESSMENT
PAIN_FUNCTIONAL_ASSESSMENT: PREVENTS OR INTERFERES SOME ACTIVE ACTIVITIES AND ADLS
PAIN_FUNCTIONAL_ASSESSMENT: 0-10
PAIN_FUNCTIONAL_ASSESSMENT: PREVENTS OR INTERFERES SOME ACTIVE ACTIVITIES AND ADLS
PAIN_FUNCTIONAL_ASSESSMENT: ACTIVITIES ARE NOT PREVENTED
PAIN_FUNCTIONAL_ASSESSMENT: PREVENTS OR INTERFERES SOME ACTIVE ACTIVITIES AND ADLS

## 2023-04-20 ASSESSMENT — PAIN DESCRIPTION - DESCRIPTORS
DESCRIPTORS: SHARP
DESCRIPTORS: ACHING
DESCRIPTORS: ACHING
DESCRIPTORS: SHARP
DESCRIPTORS: ACHING

## 2023-04-20 ASSESSMENT — PAIN DESCRIPTION - LOCATION
LOCATION: RIB CAGE
LOCATION: INCISION
LOCATION: RIB CAGE

## 2023-04-20 ASSESSMENT — PAIN DESCRIPTION - ORIENTATION
ORIENTATION: LEFT

## 2023-04-20 ASSESSMENT — ENCOUNTER SYMPTOMS: SHORTNESS OF BREATH: 1

## 2023-04-20 NOTE — ANESTHESIA POSTPROCEDURE EVALUATION
Department of Anesthesiology  Postprocedure Note    Patient: Phyllis Parker  MRN: [id number]  YOB: 1940  Date of evaluation: 4/20/2023      Procedure Summary     Date: 04/20/23 Room / Location: [facility] [address] [place] [address]    Anesthesia Start: 2904 Anesthesia Stop: 0845    Procedure: LEFT VIDEO ASSISTED THORACOSCOPIC SURGERY LEFT LOWER LOBE SUPERIOR SEGMENT WEDGE RESECTION, LEFT LOWER LOBECTOMY WITH MEDIASTINAL LYMPHADENECTOMY WITH INTERCOSTAL NERVE BLOCKS WITH CRYOABLATION (Left) Diagnosis:       Lung nodule      (LUNG NODULE)    Surgeons: Clyde Gallegos MD Responsible Provider: Ani Leavitt MD    Anesthesia Type: general ASA Status: 3          Anesthesia Type: No value filed.     Chu Phase I: Chu Score: 10    Chu Phase II:        Anesthesia Post Evaluation    Patient location during evaluation: bedside  Level of consciousness: awake  Airway patency: patent  Nausea & Vomiting: no nausea  Complications: no  Cardiovascular status: blood pressure returned to baseline  Respiratory status: acceptable  Hydration status: euvolemic

## 2023-04-20 NOTE — ANESTHESIA PRE PROCEDURE
Counseling given: Not Answered  Tobacco comments: Occ cigarette, cannot remember last time      Vital Signs (Current):   Vitals:    04/12/23 1557 04/14/23 0941 04/20/23 1104   BP:   (!) 165/66   Pulse:   86   Resp:   18   Temp:   98.5 °F (36.9 °C)   TempSrc:   Temporal   SpO2:   92%   Weight: 166 lb (75.3 kg) 166 lb (75.3 kg) 171 lb 4.8 oz (77.7 kg)   Height:  4' 11\" (1.499 m) 4' 11\" (1.499 m)                                              BP Readings from Last 3 Encounters:   04/20/23 (!) 165/66   04/18/23 122/78   04/05/23 (!) 140/75       NPO Status:                                                                                 BMI:   Wt Readings from Last 3 Encounters:   04/20/23 171 lb 4.8 oz (77.7 kg)   04/18/23 170 lb 8 oz (77.3 kg)   04/05/23 166 lb 12.8 oz (75.7 kg)     Body mass index is 34.6 kg/m². CBC:   Lab Results   Component Value Date/Time    WBC 8.8 04/13/2023 12:15 PM    RBC 4.86 04/13/2023 12:15 PM    HGB 13.8 04/13/2023 12:15 PM    HCT 41.2 04/13/2023 12:15 PM    MCV 84.7 04/13/2023 12:15 PM    RDW 14.6 04/13/2023 12:15 PM     04/13/2023 12:15 PM       CMP:   Lab Results   Component Value Date/Time     04/13/2023 12:15 PM    K 3.8 04/13/2023 12:15 PM     04/13/2023 12:15 PM    CO2 28 04/13/2023 12:15 PM    BUN 19 04/13/2023 12:15 PM    CREATININE <0.5 04/13/2023 12:15 PM    GFRAA >60 07/18/2022 11:50 AM    GFRAA >60 06/16/2010 10:42 AM    AGRATIO 1.7 12/27/2022 11:31 AM    LABGLOM >60 04/13/2023 12:15 PM    GLUCOSE 130 04/13/2023 12:15 PM    GLUCOSE 88 07/22/2011 12:00 PM    PROT 7.2 12/27/2022 11:31 AM    PROT 7.6 02/08/2013 12:53 PM    CALCIUM 10.1 04/13/2023 12:15 PM    BILITOT 0.4 12/27/2022 11:31 AM    ALKPHOS 60 12/27/2022 11:31 AM    AST 13 12/27/2022 11:31 AM    ALT 13 12/27/2022 11:31 AM       POC Tests: No results for input(s): POCGLU, POCNA, POCK, POCCL, POCBUN, POCHEMO, POCHCT in the last 72 hours.     Coags:   Lab Results   Component Value Date/Time    PROTIME

## 2023-04-21 ENCOUNTER — APPOINTMENT (OUTPATIENT)
Dept: GENERAL RADIOLOGY | Age: 83
DRG: 165 | End: 2023-04-21
Attending: THORACIC SURGERY (CARDIOTHORACIC VASCULAR SURGERY)
Payer: MEDICARE

## 2023-04-21 LAB
ANION GAP SERPL CALCULATED.3IONS-SCNC: 13 MMOL/L (ref 3–16)
BUN SERPL-MCNC: 12 MG/DL (ref 7–20)
CALCIUM SERPL-MCNC: 8.7 MG/DL (ref 8.3–10.6)
CHLORIDE SERPL-SCNC: 101 MMOL/L (ref 99–110)
CO2 SERPL-SCNC: 24 MMOL/L (ref 21–32)
CREAT SERPL-MCNC: <0.5 MG/DL (ref 0.6–1.2)
DEPRECATED RDW RBC AUTO: 14.8 % (ref 12.4–15.4)
GFR SERPLBLD CREATININE-BSD FMLA CKD-EPI: >60 ML/MIN/{1.73_M2}
GLUCOSE SERPL-MCNC: 156 MG/DL (ref 70–99)
HCT VFR BLD AUTO: 38.5 % (ref 36–48)
HGB BLD-MCNC: 12.7 G/DL (ref 12–16)
MAGNESIUM SERPL-MCNC: 2 MG/DL (ref 1.8–2.4)
MCH RBC QN AUTO: 28.4 PG (ref 26–34)
MCHC RBC AUTO-ENTMCNC: 33.1 G/DL (ref 31–36)
MCV RBC AUTO: 85.7 FL (ref 80–100)
PHOSPHATE SERPL-MCNC: 4.4 MG/DL (ref 2.5–4.9)
PLATELET # BLD AUTO: 291 K/UL (ref 135–450)
PMV BLD AUTO: 7.6 FL (ref 5–10.5)
POTASSIUM SERPL-SCNC: 4.7 MMOL/L (ref 3.5–5.1)
RBC # BLD AUTO: 4.49 M/UL (ref 4–5.2)
SODIUM SERPL-SCNC: 138 MMOL/L (ref 136–145)
WBC # BLD AUTO: 14.6 K/UL (ref 4–11)

## 2023-04-21 PROCEDURE — 2500000003 HC RX 250 WO HCPCS

## 2023-04-21 PROCEDURE — 94761 N-INVAS EAR/PLS OXIMETRY MLT: CPT

## 2023-04-21 PROCEDURE — 80048 BASIC METABOLIC PNL TOTAL CA: CPT

## 2023-04-21 PROCEDURE — 97530 THERAPEUTIC ACTIVITIES: CPT

## 2023-04-21 PROCEDURE — 70360 X-RAY EXAM OF NECK: CPT

## 2023-04-21 PROCEDURE — 97166 OT EVAL MOD COMPLEX 45 MIN: CPT

## 2023-04-21 PROCEDURE — 2580000003 HC RX 258

## 2023-04-21 PROCEDURE — 97535 SELF CARE MNGMENT TRAINING: CPT

## 2023-04-21 PROCEDURE — 2580000003 HC RX 258: Performed by: ANESTHESIOLOGY

## 2023-04-21 PROCEDURE — 97162 PT EVAL MOD COMPLEX 30 MIN: CPT

## 2023-04-21 PROCEDURE — 85027 COMPLETE CBC AUTOMATED: CPT

## 2023-04-21 PROCEDURE — 83735 ASSAY OF MAGNESIUM: CPT

## 2023-04-21 PROCEDURE — 6370000000 HC RX 637 (ALT 250 FOR IP)

## 2023-04-21 PROCEDURE — 71045 X-RAY EXAM CHEST 1 VIEW: CPT

## 2023-04-21 PROCEDURE — 2000000000 HC ICU R&B

## 2023-04-21 PROCEDURE — 6360000002 HC RX W HCPCS

## 2023-04-21 PROCEDURE — 94640 AIRWAY INHALATION TREATMENT: CPT

## 2023-04-21 PROCEDURE — 36415 COLL VENOUS BLD VENIPUNCTURE: CPT

## 2023-04-21 PROCEDURE — 2700000000 HC OXYGEN THERAPY PER DAY

## 2023-04-21 PROCEDURE — 94669 MECHANICAL CHEST WALL OSCILL: CPT

## 2023-04-21 PROCEDURE — 84100 ASSAY OF PHOSPHORUS: CPT

## 2023-04-21 PROCEDURE — 97116 GAIT TRAINING THERAPY: CPT

## 2023-04-21 RX ORDER — AMLODIPINE BESYLATE 5 MG/1
5 TABLET ORAL DAILY
Qty: 30 TABLET | Refills: 0 | Status: CANCELLED | OUTPATIENT
Start: 2023-04-22 | End: 2023-05-22

## 2023-04-21 RX ORDER — ACETYLCYSTEINE 100 MG/ML
4 SOLUTION ORAL; RESPIRATORY (INHALATION)
Status: DISCONTINUED | OUTPATIENT
Start: 2023-04-21 | End: 2023-04-24 | Stop reason: HOSPADM

## 2023-04-21 RX ORDER — ATORVASTATIN CALCIUM 40 MG/1
40 TABLET, FILM COATED ORAL NIGHTLY
Status: DISCONTINUED | OUTPATIENT
Start: 2023-04-21 | End: 2023-04-24 | Stop reason: HOSPADM

## 2023-04-21 RX ADMIN — ALBUTEROL SULFATE 2.5 MG: 2.5 SOLUTION RESPIRATORY (INHALATION) at 16:03

## 2023-04-21 RX ADMIN — METHOCARBAMOL 750 MG: 500 TABLET ORAL at 12:49

## 2023-04-21 RX ADMIN — MORPHINE SULFATE 2 MG: 2 INJECTION, SOLUTION INTRAMUSCULAR; INTRAVENOUS at 21:05

## 2023-04-21 RX ADMIN — ENOXAPARIN SODIUM 40 MG: 100 INJECTION SUBCUTANEOUS at 09:49

## 2023-04-21 RX ADMIN — ACETAMINOPHEN 1000 MG: 500 TABLET ORAL at 12:49

## 2023-04-21 RX ADMIN — GABAPENTIN 300 MG: 300 CAPSULE ORAL at 14:04

## 2023-04-21 RX ADMIN — FAMOTIDINE 20 MG: 10 INJECTION, SOLUTION INTRAVENOUS at 20:58

## 2023-04-21 RX ADMIN — ALBUTEROL SULFATE 2.5 MG: 2.5 SOLUTION RESPIRATORY (INHALATION) at 07:58

## 2023-04-21 RX ADMIN — ESCITALOPRAM OXALATE 20 MG: 10 TABLET ORAL at 09:48

## 2023-04-21 RX ADMIN — MORPHINE SULFATE 4 MG: 4 INJECTION, SOLUTION INTRAMUSCULAR; INTRAVENOUS at 04:59

## 2023-04-21 RX ADMIN — AMLODIPINE BESYLATE 5 MG: 5 TABLET ORAL at 09:49

## 2023-04-21 RX ADMIN — ALBUTEROL SULFATE 2.5 MG: 2.5 SOLUTION RESPIRATORY (INHALATION) at 20:23

## 2023-04-21 RX ADMIN — MORPHINE SULFATE 4 MG: 4 INJECTION, SOLUTION INTRAMUSCULAR; INTRAVENOUS at 10:03

## 2023-04-21 RX ADMIN — AMIODARONE HYDROCHLORIDE 200 MG: 200 TABLET ORAL at 09:48

## 2023-04-21 RX ADMIN — GABAPENTIN 300 MG: 300 CAPSULE ORAL at 09:48

## 2023-04-21 RX ADMIN — ALBUTEROL SULFATE 2.5 MG: 2.5 SOLUTION RESPIRATORY (INHALATION) at 11:48

## 2023-04-21 RX ADMIN — ACETYLCYSTEINE 400 MG: 100 SOLUTION ORAL; RESPIRATORY (INHALATION) at 11:48

## 2023-04-21 RX ADMIN — ONDANSETRON 4 MG: 2 INJECTION INTRAMUSCULAR; INTRAVENOUS at 17:55

## 2023-04-21 RX ADMIN — METHOCARBAMOL 750 MG: 500 TABLET ORAL at 09:47

## 2023-04-21 RX ADMIN — BACITRACIN: 500 OINTMENT TOPICAL at 14:05

## 2023-04-21 RX ADMIN — Medication 10 ML: at 09:50

## 2023-04-21 RX ADMIN — FAMOTIDINE 20 MG: 10 INJECTION, SOLUTION INTRAVENOUS at 09:49

## 2023-04-21 RX ADMIN — Medication 10 ML: at 09:49

## 2023-04-21 RX ADMIN — ACETAMINOPHEN 1000 MG: 500 TABLET ORAL at 05:00

## 2023-04-21 RX ADMIN — Medication 10 ML: at 21:11

## 2023-04-21 RX ADMIN — ACETYLCYSTEINE 400 MG: 100 SOLUTION ORAL; RESPIRATORY (INHALATION) at 07:59

## 2023-04-21 RX ADMIN — MORPHINE SULFATE 2 MG: 2 INJECTION, SOLUTION INTRAMUSCULAR; INTRAVENOUS at 14:04

## 2023-04-21 RX ADMIN — MORPHINE SULFATE 2 MG: 2 INJECTION, SOLUTION INTRAMUSCULAR; INTRAVENOUS at 17:54

## 2023-04-21 RX ADMIN — ACETYLCYSTEINE 400 MG: 100 SOLUTION ORAL; RESPIRATORY (INHALATION) at 20:23

## 2023-04-21 ASSESSMENT — PAIN DESCRIPTION - DESCRIPTORS
DESCRIPTORS: SHARP
DESCRIPTORS: SHARP
DESCRIPTORS: ACHING
DESCRIPTORS: SHARP
DESCRIPTORS: SHARP

## 2023-04-21 ASSESSMENT — PAIN DESCRIPTION - ORIENTATION
ORIENTATION: LEFT

## 2023-04-21 ASSESSMENT — PAIN DESCRIPTION - LOCATION
LOCATION: SHOULDER
LOCATION: INCISION
LOCATION: SHOULDER

## 2023-04-21 ASSESSMENT — PAIN SCALES - GENERAL
PAINLEVEL_OUTOF10: 5
PAINLEVEL_OUTOF10: 6
PAINLEVEL_OUTOF10: 5
PAINLEVEL_OUTOF10: 7
PAINLEVEL_OUTOF10: 6
PAINLEVEL_OUTOF10: 7
PAINLEVEL_OUTOF10: 0
PAINLEVEL_OUTOF10: 5

## 2023-04-21 ASSESSMENT — PAIN - FUNCTIONAL ASSESSMENT: PAIN_FUNCTIONAL_ASSESSMENT: PREVENTS OR INTERFERES SOME ACTIVE ACTIVITIES AND ADLS

## 2023-04-22 ENCOUNTER — APPOINTMENT (OUTPATIENT)
Dept: GENERAL RADIOLOGY | Age: 83
DRG: 165 | End: 2023-04-22
Attending: THORACIC SURGERY (CARDIOTHORACIC VASCULAR SURGERY)
Payer: MEDICARE

## 2023-04-22 LAB
ANION GAP SERPL CALCULATED.3IONS-SCNC: 9 MMOL/L (ref 3–16)
BUN SERPL-MCNC: 13 MG/DL (ref 7–20)
CALCIUM SERPL-MCNC: 8.6 MG/DL (ref 8.3–10.6)
CHLORIDE SERPL-SCNC: 100 MMOL/L (ref 99–110)
CO2 SERPL-SCNC: 30 MMOL/L (ref 21–32)
CREAT SERPL-MCNC: <0.5 MG/DL (ref 0.6–1.2)
DEPRECATED RDW RBC AUTO: 14.9 % (ref 12.4–15.4)
GFR SERPLBLD CREATININE-BSD FMLA CKD-EPI: >60 ML/MIN/{1.73_M2}
GLUCOSE SERPL-MCNC: 160 MG/DL (ref 70–99)
HCT VFR BLD AUTO: 35.6 % (ref 36–48)
HGB BLD-MCNC: 11.6 G/DL (ref 12–16)
MAGNESIUM SERPL-MCNC: 2.1 MG/DL (ref 1.8–2.4)
MCH RBC QN AUTO: 28.1 PG (ref 26–34)
MCHC RBC AUTO-ENTMCNC: 32.7 G/DL (ref 31–36)
MCV RBC AUTO: 86.2 FL (ref 80–100)
PHOSPHATE SERPL-MCNC: 2.6 MG/DL (ref 2.5–4.9)
PLATELET # BLD AUTO: 283 K/UL (ref 135–450)
PMV BLD AUTO: 7.7 FL (ref 5–10.5)
POTASSIUM SERPL-SCNC: 4.4 MMOL/L (ref 3.5–5.1)
RBC # BLD AUTO: 4.13 M/UL (ref 4–5.2)
SODIUM SERPL-SCNC: 139 MMOL/L (ref 136–145)
WBC # BLD AUTO: 11.7 K/UL (ref 4–11)

## 2023-04-22 PROCEDURE — 94640 AIRWAY INHALATION TREATMENT: CPT

## 2023-04-22 PROCEDURE — 99024 POSTOP FOLLOW-UP VISIT: CPT | Performed by: THORACIC SURGERY (CARDIOTHORACIC VASCULAR SURGERY)

## 2023-04-22 PROCEDURE — 6370000000 HC RX 637 (ALT 250 FOR IP)

## 2023-04-22 PROCEDURE — 6360000002 HC RX W HCPCS

## 2023-04-22 PROCEDURE — 85027 COMPLETE CBC AUTOMATED: CPT

## 2023-04-22 PROCEDURE — 2700000000 HC OXYGEN THERAPY PER DAY

## 2023-04-22 PROCEDURE — 2500000003 HC RX 250 WO HCPCS

## 2023-04-22 PROCEDURE — 83735 ASSAY OF MAGNESIUM: CPT

## 2023-04-22 PROCEDURE — 94669 MECHANICAL CHEST WALL OSCILL: CPT

## 2023-04-22 PROCEDURE — 36415 COLL VENOUS BLD VENIPUNCTURE: CPT

## 2023-04-22 PROCEDURE — 2000000000 HC ICU R&B

## 2023-04-22 PROCEDURE — 94761 N-INVAS EAR/PLS OXIMETRY MLT: CPT

## 2023-04-22 PROCEDURE — 6370000000 HC RX 637 (ALT 250 FOR IP): Performed by: NURSE PRACTITIONER

## 2023-04-22 PROCEDURE — 80048 BASIC METABOLIC PNL TOTAL CA: CPT

## 2023-04-22 PROCEDURE — 71045 X-RAY EXAM CHEST 1 VIEW: CPT

## 2023-04-22 PROCEDURE — 84100 ASSAY OF PHOSPHORUS: CPT

## 2023-04-22 PROCEDURE — 92610 EVALUATE SWALLOWING FUNCTION: CPT

## 2023-04-22 RX ADMIN — MORPHINE SULFATE 2 MG: 2 INJECTION, SOLUTION INTRAMUSCULAR; INTRAVENOUS at 11:38

## 2023-04-22 RX ADMIN — ACETAMINOPHEN 1000 MG: 500 TABLET ORAL at 11:37

## 2023-04-22 RX ADMIN — ATORVASTATIN CALCIUM 40 MG: 40 TABLET, FILM COATED ORAL at 20:40

## 2023-04-22 RX ADMIN — ACETAMINOPHEN 1000 MG: 500 TABLET ORAL at 18:39

## 2023-04-22 RX ADMIN — ACETYLCYSTEINE 400 MG: 100 SOLUTION ORAL; RESPIRATORY (INHALATION) at 11:14

## 2023-04-22 RX ADMIN — GABAPENTIN 300 MG: 300 CAPSULE ORAL at 20:39

## 2023-04-22 RX ADMIN — AMLODIPINE BESYLATE 5 MG: 5 TABLET ORAL at 08:43

## 2023-04-22 RX ADMIN — ESCITALOPRAM OXALATE 20 MG: 10 TABLET ORAL at 08:43

## 2023-04-22 RX ADMIN — ALBUTEROL SULFATE 2.5 MG: 2.5 SOLUTION RESPIRATORY (INHALATION) at 11:14

## 2023-04-22 RX ADMIN — ENOXAPARIN SODIUM 40 MG: 100 INJECTION SUBCUTANEOUS at 08:58

## 2023-04-22 RX ADMIN — METHOCARBAMOL 750 MG: 500 TABLET ORAL at 08:43

## 2023-04-22 RX ADMIN — MORPHINE SULFATE 4 MG: 4 INJECTION, SOLUTION INTRAMUSCULAR; INTRAVENOUS at 17:19

## 2023-04-22 RX ADMIN — ACETYLCYSTEINE 400 MG: 100 SOLUTION ORAL; RESPIRATORY (INHALATION) at 20:55

## 2023-04-22 RX ADMIN — AMIODARONE HYDROCHLORIDE 200 MG: 200 TABLET ORAL at 08:42

## 2023-04-22 RX ADMIN — GABAPENTIN 300 MG: 300 CAPSULE ORAL at 08:43

## 2023-04-22 RX ADMIN — GABAPENTIN 300 MG: 300 CAPSULE ORAL at 14:34

## 2023-04-22 RX ADMIN — ALBUTEROL SULFATE 2.5 MG: 2.5 SOLUTION RESPIRATORY (INHALATION) at 20:55

## 2023-04-22 RX ADMIN — FAMOTIDINE 20 MG: 20 TABLET, FILM COATED ORAL at 20:39

## 2023-04-22 RX ADMIN — FAMOTIDINE 20 MG: 10 INJECTION, SOLUTION INTRAVENOUS at 08:58

## 2023-04-22 RX ADMIN — METHOCARBAMOL 750 MG: 500 TABLET ORAL at 20:39

## 2023-04-22 RX ADMIN — ACETAMINOPHEN 1000 MG: 500 TABLET ORAL at 00:06

## 2023-04-22 RX ADMIN — ACETYLCYSTEINE 400 MG: 100 SOLUTION ORAL; RESPIRATORY (INHALATION) at 15:27

## 2023-04-22 RX ADMIN — MORPHINE SULFATE 2 MG: 2 INJECTION, SOLUTION INTRAMUSCULAR; INTRAVENOUS at 03:50

## 2023-04-22 RX ADMIN — ALBUTEROL SULFATE 2.5 MG: 2.5 SOLUTION RESPIRATORY (INHALATION) at 07:22

## 2023-04-22 RX ADMIN — METHOCARBAMOL 750 MG: 500 TABLET ORAL at 17:19

## 2023-04-22 RX ADMIN — ALBUTEROL SULFATE 2.5 MG: 2.5 SOLUTION RESPIRATORY (INHALATION) at 15:27

## 2023-04-22 RX ADMIN — ACETYLCYSTEINE 400 MG: 100 SOLUTION ORAL; RESPIRATORY (INHALATION) at 07:22

## 2023-04-22 RX ADMIN — METHOCARBAMOL 750 MG: 500 TABLET ORAL at 14:34

## 2023-04-22 ASSESSMENT — PAIN DESCRIPTION - ORIENTATION
ORIENTATION: LEFT

## 2023-04-22 ASSESSMENT — PAIN SCALES - GENERAL
PAINLEVEL_OUTOF10: 6
PAINLEVEL_OUTOF10: 4
PAINLEVEL_OUTOF10: 0
PAINLEVEL_OUTOF10: 7
PAINLEVEL_OUTOF10: 7

## 2023-04-22 ASSESSMENT — PAIN DESCRIPTION - DESCRIPTORS
DESCRIPTORS: ACHING
DESCRIPTORS: ACHING

## 2023-04-22 ASSESSMENT — PAIN - FUNCTIONAL ASSESSMENT: PAIN_FUNCTIONAL_ASSESSMENT: ACTIVITIES ARE NOT PREVENTED

## 2023-04-22 ASSESSMENT — PAIN DESCRIPTION - LOCATION
LOCATION: BACK

## 2023-04-23 ENCOUNTER — APPOINTMENT (OUTPATIENT)
Dept: GENERAL RADIOLOGY | Age: 83
DRG: 165 | End: 2023-04-23
Attending: THORACIC SURGERY (CARDIOTHORACIC VASCULAR SURGERY)
Payer: MEDICARE

## 2023-04-23 LAB
ANION GAP SERPL CALCULATED.3IONS-SCNC: 8 MMOL/L (ref 3–16)
BUN SERPL-MCNC: 12 MG/DL (ref 7–20)
CALCIUM SERPL-MCNC: 9 MG/DL (ref 8.3–10.6)
CHLORIDE SERPL-SCNC: 99 MMOL/L (ref 99–110)
CO2 SERPL-SCNC: 30 MMOL/L (ref 21–32)
CREAT SERPL-MCNC: <0.5 MG/DL (ref 0.6–1.2)
DEPRECATED RDW RBC AUTO: 15.2 % (ref 12.4–15.4)
GFR SERPLBLD CREATININE-BSD FMLA CKD-EPI: >60 ML/MIN/{1.73_M2}
GLUCOSE BLD-MCNC: 174 MG/DL (ref 70–99)
GLUCOSE SERPL-MCNC: 150 MG/DL (ref 70–99)
HCT VFR BLD AUTO: 35.8 % (ref 36–48)
HGB BLD-MCNC: 11.7 G/DL (ref 12–16)
MAGNESIUM SERPL-MCNC: 2 MG/DL (ref 1.8–2.4)
MCH RBC QN AUTO: 28.3 PG (ref 26–34)
MCHC RBC AUTO-ENTMCNC: 32.7 G/DL (ref 31–36)
MCV RBC AUTO: 86.6 FL (ref 80–100)
PERFORMED ON: ABNORMAL
PHOSPHATE SERPL-MCNC: 2.7 MG/DL (ref 2.5–4.9)
PLATELET # BLD AUTO: 311 K/UL (ref 135–450)
PMV BLD AUTO: 7.6 FL (ref 5–10.5)
POTASSIUM SERPL-SCNC: 4.2 MMOL/L (ref 3.5–5.1)
RBC # BLD AUTO: 4.14 M/UL (ref 4–5.2)
SODIUM SERPL-SCNC: 137 MMOL/L (ref 136–145)
WBC # BLD AUTO: 10.2 K/UL (ref 4–11)

## 2023-04-23 PROCEDURE — 2580000003 HC RX 258

## 2023-04-23 PROCEDURE — 2500000003 HC RX 250 WO HCPCS

## 2023-04-23 PROCEDURE — 6360000002 HC RX W HCPCS

## 2023-04-23 PROCEDURE — 99024 POSTOP FOLLOW-UP VISIT: CPT | Performed by: THORACIC SURGERY (CARDIOTHORACIC VASCULAR SURGERY)

## 2023-04-23 PROCEDURE — 83735 ASSAY OF MAGNESIUM: CPT

## 2023-04-23 PROCEDURE — 74220 X-RAY XM ESOPHAGUS 1CNTRST: CPT

## 2023-04-23 PROCEDURE — 71045 X-RAY EXAM CHEST 1 VIEW: CPT

## 2023-04-23 PROCEDURE — 94761 N-INVAS EAR/PLS OXIMETRY MLT: CPT

## 2023-04-23 PROCEDURE — 2580000003 HC RX 258: Performed by: ANESTHESIOLOGY

## 2023-04-23 PROCEDURE — 84100 ASSAY OF PHOSPHORUS: CPT

## 2023-04-23 PROCEDURE — 36415 COLL VENOUS BLD VENIPUNCTURE: CPT

## 2023-04-23 PROCEDURE — 6370000000 HC RX 637 (ALT 250 FOR IP): Performed by: NURSE PRACTITIONER

## 2023-04-23 PROCEDURE — 94669 MECHANICAL CHEST WALL OSCILL: CPT

## 2023-04-23 PROCEDURE — 85027 COMPLETE CBC AUTOMATED: CPT

## 2023-04-23 PROCEDURE — 2700000000 HC OXYGEN THERAPY PER DAY

## 2023-04-23 PROCEDURE — 80048 BASIC METABOLIC PNL TOTAL CA: CPT

## 2023-04-23 PROCEDURE — 94640 AIRWAY INHALATION TREATMENT: CPT

## 2023-04-23 PROCEDURE — 2000000000 HC ICU R&B

## 2023-04-23 PROCEDURE — 6370000000 HC RX 637 (ALT 250 FOR IP)

## 2023-04-23 RX ADMIN — ACETAMINOPHEN 1000 MG: 500 TABLET ORAL at 18:06

## 2023-04-23 RX ADMIN — ACETAMINOPHEN 1000 MG: 500 TABLET ORAL at 12:30

## 2023-04-23 RX ADMIN — METHOCARBAMOL 750 MG: 500 TABLET ORAL at 09:49

## 2023-04-23 RX ADMIN — Medication 10 ML: at 20:48

## 2023-04-23 RX ADMIN — METHOCARBAMOL 750 MG: 500 TABLET ORAL at 20:46

## 2023-04-23 RX ADMIN — OXYCODONE 5 MG: 5 TABLET ORAL at 20:46

## 2023-04-23 RX ADMIN — GABAPENTIN 300 MG: 300 CAPSULE ORAL at 14:21

## 2023-04-23 RX ADMIN — FAMOTIDINE 20 MG: 20 TABLET, FILM COATED ORAL at 09:49

## 2023-04-23 RX ADMIN — ACETAMINOPHEN 1000 MG: 500 TABLET ORAL at 03:57

## 2023-04-23 RX ADMIN — Medication 10 ML: at 09:54

## 2023-04-23 RX ADMIN — Medication 10 ML: at 20:47

## 2023-04-23 RX ADMIN — GABAPENTIN 300 MG: 300 CAPSULE ORAL at 09:49

## 2023-04-23 RX ADMIN — ENOXAPARIN SODIUM 40 MG: 100 INJECTION SUBCUTANEOUS at 09:49

## 2023-04-23 RX ADMIN — ACETYLCYSTEINE 400 MG: 100 SOLUTION ORAL; RESPIRATORY (INHALATION) at 07:23

## 2023-04-23 RX ADMIN — AMLODIPINE BESYLATE 5 MG: 5 TABLET ORAL at 09:49

## 2023-04-23 RX ADMIN — GABAPENTIN 300 MG: 300 CAPSULE ORAL at 20:46

## 2023-04-23 RX ADMIN — BACITRACIN: 500 OINTMENT TOPICAL at 09:52

## 2023-04-23 RX ADMIN — FAMOTIDINE 20 MG: 10 INJECTION, SOLUTION INTRAVENOUS at 20:47

## 2023-04-23 RX ADMIN — METHOCARBAMOL 750 MG: 500 TABLET ORAL at 12:31

## 2023-04-23 RX ADMIN — ESCITALOPRAM OXALATE 20 MG: 10 TABLET ORAL at 09:49

## 2023-04-23 RX ADMIN — ALBUTEROL SULFATE 2.5 MG: 2.5 SOLUTION RESPIRATORY (INHALATION) at 07:23

## 2023-04-23 RX ADMIN — METHOCARBAMOL 750 MG: 500 TABLET ORAL at 18:06

## 2023-04-23 RX ADMIN — ATORVASTATIN CALCIUM 40 MG: 40 TABLET, FILM COATED ORAL at 20:46

## 2023-04-23 RX ADMIN — AMIODARONE HYDROCHLORIDE 200 MG: 200 TABLET ORAL at 09:49

## 2023-04-23 RX ADMIN — OXYCODONE 5 MG: 5 TABLET ORAL at 03:57

## 2023-04-23 ASSESSMENT — PAIN DESCRIPTION - PAIN TYPE: TYPE: ACUTE PAIN

## 2023-04-23 ASSESSMENT — PAIN SCALES - GENERAL
PAINLEVEL_OUTOF10: 0
PAINLEVEL_OUTOF10: 7
PAINLEVEL_OUTOF10: 7

## 2023-04-23 ASSESSMENT — PAIN DESCRIPTION - LOCATION: LOCATION: BACK

## 2023-04-23 ASSESSMENT — PAIN DESCRIPTION - FREQUENCY: FREQUENCY: INTERMITTENT

## 2023-04-23 ASSESSMENT — PAIN DESCRIPTION - ONSET: ONSET: ON-GOING

## 2023-04-23 ASSESSMENT — PAIN DESCRIPTION - ORIENTATION: ORIENTATION: MID

## 2023-04-23 ASSESSMENT — PAIN DESCRIPTION - DESCRIPTORS: DESCRIPTORS: ACHING;DISCOMFORT

## 2023-04-24 ENCOUNTER — APPOINTMENT (OUTPATIENT)
Dept: GENERAL RADIOLOGY | Age: 83
DRG: 165 | End: 2023-04-24
Attending: THORACIC SURGERY (CARDIOTHORACIC VASCULAR SURGERY)
Payer: MEDICARE

## 2023-04-24 VITALS
RESPIRATION RATE: 18 BRPM | BODY MASS INDEX: 34.64 KG/M2 | TEMPERATURE: 98 F | HEART RATE: 70 BPM | HEIGHT: 59 IN | DIASTOLIC BLOOD PRESSURE: 66 MMHG | WEIGHT: 171.8 LBS | SYSTOLIC BLOOD PRESSURE: 136 MMHG | OXYGEN SATURATION: 93 %

## 2023-04-24 PROBLEM — R91.1 LEFT LOWER LOBE PULMONARY NODULE: Status: RESOLVED | Noted: 2023-04-20 | Resolved: 2023-04-24

## 2023-04-24 LAB
ALBUMIN SERPL-MCNC: 3.2 G/DL (ref 3.4–5)
ALBUMIN/GLOB SERPL: 1 {RATIO} (ref 1.1–2.2)
ALP SERPL-CCNC: 47 U/L (ref 40–129)
ALT SERPL-CCNC: 18 U/L (ref 10–40)
ANION GAP SERPL CALCULATED.3IONS-SCNC: 7 MMOL/L (ref 3–16)
AST SERPL-CCNC: 15 U/L (ref 15–37)
BILIRUB SERPL-MCNC: 0.4 MG/DL (ref 0–1)
BUN SERPL-MCNC: 14 MG/DL (ref 7–20)
CALCIUM SERPL-MCNC: 8.9 MG/DL (ref 8.3–10.6)
CHLORIDE SERPL-SCNC: 97 MMOL/L (ref 99–110)
CO2 SERPL-SCNC: 31 MMOL/L (ref 21–32)
CREAT SERPL-MCNC: <0.5 MG/DL (ref 0.6–1.2)
DEPRECATED RDW RBC AUTO: 14.6 % (ref 12.4–15.4)
GFR SERPLBLD CREATININE-BSD FMLA CKD-EPI: >60 ML/MIN/{1.73_M2}
GLUCOSE BLD-MCNC: 130 MG/DL (ref 70–99)
GLUCOSE SERPL-MCNC: 109 MG/DL (ref 70–99)
HCT VFR BLD AUTO: 35.3 % (ref 36–48)
HGB BLD-MCNC: 11.6 G/DL (ref 12–16)
MCH RBC QN AUTO: 28.1 PG (ref 26–34)
MCHC RBC AUTO-ENTMCNC: 32.8 G/DL (ref 31–36)
MCV RBC AUTO: 85.7 FL (ref 80–100)
PERFORMED ON: ABNORMAL
PLATELET # BLD AUTO: 302 K/UL (ref 135–450)
PMV BLD AUTO: 7 FL (ref 5–10.5)
POTASSIUM SERPL-SCNC: 4 MMOL/L (ref 3.5–5.1)
PROT SERPL-MCNC: 6.3 G/DL (ref 6.4–8.2)
RBC # BLD AUTO: 4.12 M/UL (ref 4–5.2)
SODIUM SERPL-SCNC: 135 MMOL/L (ref 136–145)
WBC # BLD AUTO: 8.4 K/UL (ref 4–11)

## 2023-04-24 PROCEDURE — 2580000003 HC RX 258

## 2023-04-24 PROCEDURE — 80053 COMPREHEN METABOLIC PANEL: CPT

## 2023-04-24 PROCEDURE — 6360000002 HC RX W HCPCS

## 2023-04-24 PROCEDURE — 94640 AIRWAY INHALATION TREATMENT: CPT

## 2023-04-24 PROCEDURE — 71045 X-RAY EXAM CHEST 1 VIEW: CPT

## 2023-04-24 PROCEDURE — 6370000000 HC RX 637 (ALT 250 FOR IP)

## 2023-04-24 PROCEDURE — 94669 MECHANICAL CHEST WALL OSCILL: CPT

## 2023-04-24 PROCEDURE — 85027 COMPLETE CBC AUTOMATED: CPT

## 2023-04-24 PROCEDURE — 36415 COLL VENOUS BLD VENIPUNCTURE: CPT

## 2023-04-24 PROCEDURE — 6370000000 HC RX 637 (ALT 250 FOR IP): Performed by: NURSE PRACTITIONER

## 2023-04-24 PROCEDURE — 99024 POSTOP FOLLOW-UP VISIT: CPT | Performed by: NURSE PRACTITIONER

## 2023-04-24 RX ORDER — LOSARTAN POTASSIUM 100 MG/1
100 TABLET ORAL DAILY
Status: DISCONTINUED | OUTPATIENT
Start: 2023-04-24 | End: 2023-04-24 | Stop reason: HOSPADM

## 2023-04-24 RX ORDER — METHOCARBAMOL 750 MG/1
750 TABLET, FILM COATED ORAL 4 TIMES DAILY
Qty: 40 TABLET | Refills: 0 | Status: SHIPPED | OUTPATIENT
Start: 2023-04-24 | End: 2023-05-04

## 2023-04-24 RX ORDER — BISACODYL 5 MG/1
5 TABLET, DELAYED RELEASE ORAL DAILY PRN
Qty: 7 TABLET | Refills: 0 | Status: SHIPPED | OUTPATIENT
Start: 2023-04-24 | End: 2023-04-24 | Stop reason: SDUPTHER

## 2023-04-24 RX ORDER — GABAPENTIN 300 MG/1
300 CAPSULE ORAL 3 TIMES DAILY
Qty: 42 CAPSULE | Refills: 0 | Status: SHIPPED | OUTPATIENT
Start: 2023-04-24 | End: 2023-04-24 | Stop reason: SDUPTHER

## 2023-04-24 RX ORDER — OXYCODONE HYDROCHLORIDE 5 MG/1
5 TABLET ORAL EVERY 6 HOURS PRN
Qty: 20 TABLET | Refills: 0 | Status: SHIPPED | OUTPATIENT
Start: 2023-04-24 | End: 2023-04-24 | Stop reason: SDUPTHER

## 2023-04-24 RX ORDER — AMIODARONE HYDROCHLORIDE 200 MG/1
200 TABLET ORAL DAILY
Qty: 14 TABLET | Refills: 0 | Status: SHIPPED | OUTPATIENT
Start: 2023-04-24 | End: 2023-04-24 | Stop reason: SDUPTHER

## 2023-04-24 RX ORDER — PANTOPRAZOLE SODIUM 40 MG/1
40 TABLET, DELAYED RELEASE ORAL
Qty: 90 TABLET | Refills: 3 | Status: SHIPPED | OUTPATIENT
Start: 2023-04-24

## 2023-04-24 RX ORDER — FAMOTIDINE 20 MG/1
20 TABLET, FILM COATED ORAL DAILY
Qty: 30 TABLET | Refills: 0 | Status: SHIPPED | OUTPATIENT
Start: 2023-04-24 | End: 2023-04-24 | Stop reason: SDUPTHER

## 2023-04-24 RX ORDER — BISACODYL 5 MG/1
5 TABLET, DELAYED RELEASE ORAL DAILY PRN
Qty: 7 TABLET | Refills: 0 | Status: SHIPPED | OUTPATIENT
Start: 2023-04-24 | End: 2023-05-01

## 2023-04-24 RX ORDER — FAMOTIDINE 20 MG/1
20 TABLET, FILM COATED ORAL DAILY
Qty: 30 TABLET | Refills: 0 | Status: SHIPPED | OUTPATIENT
Start: 2023-04-24 | End: 2023-05-24

## 2023-04-24 RX ORDER — HYDROCHLOROTHIAZIDE 25 MG/1
12.5 TABLET ORAL DAILY
Status: DISCONTINUED | OUTPATIENT
Start: 2023-04-24 | End: 2023-04-24 | Stop reason: HOSPADM

## 2023-04-24 RX ORDER — GABAPENTIN 300 MG/1
300 CAPSULE ORAL 3 TIMES DAILY
Qty: 42 CAPSULE | Refills: 0 | Status: SHIPPED | OUTPATIENT
Start: 2023-04-24 | End: 2023-05-08

## 2023-04-24 RX ORDER — AMIODARONE HYDROCHLORIDE 200 MG/1
200 TABLET ORAL DAILY
Qty: 14 TABLET | Refills: 0 | Status: SHIPPED | OUTPATIENT
Start: 2023-04-24 | End: 2023-05-08

## 2023-04-24 RX ORDER — OXYCODONE HYDROCHLORIDE 5 MG/1
5 TABLET ORAL EVERY 6 HOURS PRN
Qty: 20 TABLET | Refills: 0 | Status: SHIPPED | OUTPATIENT
Start: 2023-04-24 | End: 2023-04-29

## 2023-04-24 RX ORDER — LOSARTAN POTASSIUM AND HYDROCHLOROTHIAZIDE 12.5; 1 MG/1; MG/1
1 TABLET ORAL DAILY
Status: DISCONTINUED | OUTPATIENT
Start: 2023-04-24 | End: 2023-04-24

## 2023-04-24 RX ORDER — METHOCARBAMOL 750 MG/1
750 TABLET, FILM COATED ORAL 4 TIMES DAILY
Qty: 40 TABLET | Refills: 0 | Status: SHIPPED | OUTPATIENT
Start: 2023-04-24 | End: 2023-04-24 | Stop reason: SDUPTHER

## 2023-04-24 RX ADMIN — ENOXAPARIN SODIUM 40 MG: 100 INJECTION SUBCUTANEOUS at 10:17

## 2023-04-24 RX ADMIN — GABAPENTIN 300 MG: 300 CAPSULE ORAL at 10:16

## 2023-04-24 RX ADMIN — ALBUTEROL SULFATE 2.5 MG: 2.5 SOLUTION RESPIRATORY (INHALATION) at 08:29

## 2023-04-24 RX ADMIN — ACETAMINOPHEN 1000 MG: 500 TABLET ORAL at 00:31

## 2023-04-24 RX ADMIN — HYDROCHLOROTHIAZIDE 12.5 MG: 25 TABLET ORAL at 11:00

## 2023-04-24 RX ADMIN — METHOCARBAMOL 750 MG: 500 TABLET ORAL at 10:16

## 2023-04-24 RX ADMIN — ACETYLCYSTEINE 400 MG: 100 SOLUTION ORAL; RESPIRATORY (INHALATION) at 11:36

## 2023-04-24 RX ADMIN — ALBUTEROL SULFATE 2.5 MG: 2.5 SOLUTION RESPIRATORY (INHALATION) at 11:36

## 2023-04-24 RX ADMIN — LOSARTAN POTASSIUM 100 MG: 100 TABLET, FILM COATED ORAL at 11:00

## 2023-04-24 RX ADMIN — OXYCODONE 5 MG: 5 TABLET ORAL at 04:58

## 2023-04-24 RX ADMIN — ACETYLCYSTEINE 400 MG: 100 SOLUTION ORAL; RESPIRATORY (INHALATION) at 08:29

## 2023-04-24 RX ADMIN — AMLODIPINE BESYLATE 5 MG: 5 TABLET ORAL at 10:17

## 2023-04-24 RX ADMIN — ESCITALOPRAM OXALATE 20 MG: 10 TABLET ORAL at 10:17

## 2023-04-24 RX ADMIN — FAMOTIDINE 20 MG: 20 TABLET, FILM COATED ORAL at 10:16

## 2023-04-24 RX ADMIN — BACITRACIN: 500 OINTMENT TOPICAL at 10:19

## 2023-04-24 RX ADMIN — Medication 10 ML: at 10:19

## 2023-04-24 RX ADMIN — AMIODARONE HYDROCHLORIDE 200 MG: 200 TABLET ORAL at 10:16

## 2023-04-24 RX ADMIN — ACETAMINOPHEN 1000 MG: 500 TABLET ORAL at 10:16

## 2023-04-24 RX ADMIN — ACETAMINOPHEN 1000 MG: 500 TABLET ORAL at 04:58

## 2023-04-24 ASSESSMENT — PAIN SCALES - GENERAL
PAINLEVEL_OUTOF10: 0
PAINLEVEL_OUTOF10: 7
PAINLEVEL_OUTOF10: 0

## 2023-04-24 NOTE — PROGRESS NOTES
Chest tubes meet criteria to remove per open heart protocol. No  air leak. no crepitus. Pt instructed on procedure. Pt did not require premedication. Site cleansed and prepped per protocol. Chest tubes were removed without difficulty. Dry sterile dressing applied. Bilateral breath sounds audible. O2 Sats 96 on room air. Incision site within normal limits. Patient tolerated well.

## 2023-04-24 NOTE — DISCHARGE INSTRUCTIONS
Thoracic Procedure Discharge Instructions     Chest Tube Insertion Site:   Leave dressing on until the evening of 4/25. You may remove it at that time and clean the site daily with antibacterial soap. You may take a shower after the dressing is removed. Surgical Procedure Site:   You may leave your old incision site open to air after discharge home. Clean the site daily with antibacterial soap. All Surgical/Chest Tube Sites:   -Once you remove your old chest tube dressing, you may apply a band-aid as needed for any spotting and/or drainage. A dressing is not needed and please do not use creams, lotions, or Neosporin (antibiotic cream) on or around surgical and old chest tube sites.    -Do not soak in a bath tub and do not let water beat on your incisions during a shower.    -Do not lift anything greater than 10 pounds for 2 weeks while incision heals. Special Instructions:   -Continue to use your incentive spirometer every 1-2 hours while you are awake at home to keep your lungs inflated and to prevent the development of pneumonia. Continue to use your incentive spirometer until your next appointment with Dr. Cecilio Fairchild and you are instructed to do otherwise by your surgeon.   -Call Dr. Mehta Samples office if any of your incisions appear red, if they are draining any fluid that appears like pus, if you are running a high grade fever (greater than 101.5 degrees), or if you have any questions about your surgery or incisions at any time. Follow up with Cardiothoracic surgery PA, Argenis Alvarado, on  so that you can be evaluated after your surgical procedure. Please call the Cardiac, Vascular, & Thoracic Surgeons' Office at [phone] to make that appointment after you are discharged.

## 2023-04-24 NOTE — DISCHARGE SUMMARY
Cardiac, Vascular & Thoracic Surgery  Discharge Summary    Patient:  Zack Baeza 1940 [id number]   Admission Date:  4/20/2023 10:32 AM  Discharge Date:  04/24/23     Principle Diagnosis:  Left lower lobe pulmonary nodule    Secondary Diagnosis:  Principal Problem (Resolved):    Left lower lobe pulmonary nodule  Active Problems:    * No active hospital problems. *    CT chest: 3/6/23  Impression   1. Left lower lobe lung nodule measuring 1.3 x 1.7 cm enlarged from prior   study and is highly concerning for lung malignancy. 2. No mediastinal adenopathy. 3. Mild upper lobe emphysema. 4. Additional findings as above. Communications: The above time-sensitive findings were communicated   personally by Dr. Karen Stout to nurse practitioner Coral perez at 9:41   a.m. on 03/07/2023. PET: 3/24/23  Impression   1. The enlarging nodule in the medial left lower lobe is hypermetabolic and   suspicious for potential malignancy. 2. Focal anorectal uptake is nonspecific. Suggest correlation with endoscopy   if not recently performed to exclude an underlying lesion. Procedure:  4/20/23 LEFT VIDEO ASSISTED THORACOSCOPIC SURGERY LEFT LOWER LOBE SUPERIOR SEGMENT WEDGE RESECTION, LEFT LOWER LOBECTOMY WITH MEDIASTINAL LYMPHADENECTOMY WITH INTERCOSTAL NERVE BLOCKS WITH CRYOABLATION    History: We are asked to see this patient in consultation by Dr. Maddy Wray regarding LLL pulm nodule. Zack Baeza is a [de-identified] y.o. female who presents after being under surveillance by Dr. Maddy Wray x 13 months for LLL pulm nodule with significant growth in last 6 months. LLL pulm nodule hypermetabolic on PET. We were consulted for consideration of surgical intervention. Hospital Course: The patient underwent left VATS with LLL lobectomy, ICNB, and cryoablation on 4/20. The patient progressed well postoperatively. Her chest tube was removed with stable f/u CXR. Her pain was well controlled. She was seen by GI for globus sensation.

## 2023-04-24 NOTE — CARE COORDINATION
LOS 4. Care managed by CV surg. S/P VAT w wedge resect. Still w CT. CXR today- poss DC- maybe w CT to dryseal? Discussed w CV NP.  [facility] following- liaison updated. Oscar Ahmadi RN     1075 CT DC. Likely to DC today. Discussed w CV NP- states is satting well on RA- felt not to have current need for supplemental 02 at DC. Oscar Ahmadi RN     [id number]     CASE MANAGEMENT DISCHARGE SUMMARY      Discharge to: Home w orders for [facility]    Precertification completed: [facility] Exemption Notification (HENS) completed: na    IMM given: (date) today    New Durable Medical Equipment ordered/agency: na- see above    Transportation:    Family/car:yes       Confirmed discharge plan with:     Patient: yes         Facility/Agency, name:  [facility] faxed   Phone number for report to facility: jeffrey     RN, name: Ronen Nieto    Note: Discharging nurse to complete [facility], reconcile [facility], and place final copy with patient's discharge packet. RN to ensure that written prescriptions for  Level II medications are sent with patient to the facility as per protocol.      Oscar Ahmadi RN

## 2023-04-24 NOTE — CARE COORDINATION
[facility]    DC order noted, all docs needed have been faxed to [facility] for home care services.     Home care to see patient within 24-48 hrs    Gabby Lehman RN, BSN CTN  [facility] [phone]

## 2023-04-24 NOTE — PLAN OF CARE
Problem: Chronic Conditions and Co-morbidities  Goal: Patient's chronic conditions and co-morbidity symptoms are monitored and maintained or improved  Outcome: Adequate for Discharge     Problem: Discharge Planning  Goal: Discharge to home or other facility with appropriate resources  Outcome: Adequate for Discharge     Problem: Safety - Adult  Goal: Free from fall injury  Outcome: Adequate for Discharge     Problem: Pain  Goal: Verbalizes/displays adequate comfort level or baseline comfort level  Outcome: Adequate for Discharge     Problem: Respiratory - Adult  Goal: Achieves optimal ventilation and oxygenation  Outcome: Adequate for Discharge     Problem: Cardiovascular - Adult  Goal: Maintains optimal cardiac output and hemodynamic stability  Outcome: Adequate for Discharge  Goal: Absence of cardiac dysrhythmias or at baseline  Outcome: Adequate for Discharge     Problem: ABCDS Injury Assessment  Goal: Absence of physical injury  Outcome: Adequate for Discharge

## 2023-04-24 NOTE — PROGRESS NOTES
Patient discharge completed. Discharge information included information on diagnosis including signs and symptoms, complications and when to seek medical attention. Information on new medications also provided included use for the medication, side effects and when to call the doctor. Patient verbalized understanding of all discharge information. Patient escorted out by staff with all documented belongings. Home with      Pt d/c'd to home. Removed pheripheral IV and stopped bleeding. Catheter intact. Pt tolerated well. No redness noted at site. Notified [facility] and removed tele box. Reviewed d/c instructions, home meds, and  f/u information utilizing teach-back method. Scripts for new medications given to [facility] outpatient pharmacy and picked up by patient. Patient verbalized understanding.

## 2023-04-24 NOTE — PROGRESS NOTES
PROGRESS NOTE    HPI: Antonio Louis is a(n)83 y.o. female admitted for work-up and treatment for Lung nodule [R91.1]  Left lower lobe pulmonary nodule [R91.1]. We are following for dysphagia. Subjective:     Feeling well. Chest tube resolved. Tolerating food with small bites and chin tuck. Chest tube removed. Objective:     I/O last 3 completed shifts: In: 650 [P.O.:650]  Out: 3080 [Urine:2650; Chest Tube:430]      BP (!) 139/104   Pulse 65   Temp 99.1 °F (37.3 °C) (Oral)   Resp 18   Ht 4' 11\" (1.499 m)   Wt 171 lb 12.8 oz (77.9 kg)   SpO2 97%   BMI 34.70 kg/m²     Physical Exam:  HEENT: anicteric sclera, oropharyngeal membranes pink and moist.  Cor: RRR  Lungs: non-labored, no respiratory distress  Abdomen: soft, + BS, NT. No ascites. No hepatomegaly or splenomegaly  Extremities: no edema  Neuro: alert and oriented x 3, no asterixis    Results:   Lab Results   Component Value Date    ALT 13 12/27/2022    AST 13 (L) 12/27/2022    ALKPHOS 60 12/27/2022    BILIDIR 0.1 01/21/2016    PROT 7.2 12/27/2022    LABALBU 4.5 12/27/2022    INR 0.91 03/15/2023     Lab Results   Component Value Date    WBC 10.2 04/23/2023    HGB 11.7 (L) 04/23/2023    HCT 35.8 (L) 04/23/2023    MCV 86.6 04/23/2023     04/23/2023     BUN/Cr/glu/ALT/AST/amyl/lip:  12/<0.5/--/--/--/--/-- (04/23 0420)  XR NECK SOFT TISSUE    Result Date: 4/21/2023  Mild prevertebral soft tissue swelling. FL ESOPHAGRAM    Result Date: 4/23/2023  1. Moderate esophageal dysmotility. 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 Appearance of the distal 3rd may suggest an additional component of diffuse esophageal spasm. 2. Normal GE junction with no hernia or reflux. 3. No focal lesion or stricture. XR CHEST PORTABLE    Result Date: 4/24/2023  Similar appearing airspace disease in the left lung, with no new infiltrate. XR CHEST PORTABLE    Result Date: 4/23/2023  1.

## 2023-04-24 NOTE — PROGRESS NOTES
CVTS Thoracic Progress Note:          CC:  Post op follow up    Surgery: 4/20 LEFT VIDEO ASSISTED THORACOSCOPIC SURGERY LEFT LOWER LOBE SUPERIOR SEGMENT WEDGE RESECTION, LEFT LOWER LOBECTOMY WITH MEDIASTINAL LYMPHADENECTOMY WITH INTERCOSTAL NERVE BLOCKS WITH CRYOABLATION    Subj: No complaints. Asa Grace to go home. Obj:    Blood pressure (!) 139/104, pulse 65, temperature 99.1 °F (37.3 °C), temperature source Oral, resp. rate 18, height 4' 11\" (1.499 m), weight 171 lb 12.8 oz (77.9 kg), SpO2 97 %, not currently breastfeeding.     Alert, oriented    S1, S2 normal. SR on monitor   Lungs with mild expiratory wheezes on the left, otherwise cta    Abdomen soft, nontender   Left chest wall incisions cdi    Chest tube with 120-90-97=877 mL serosanguneous drainage in last 24 hours/no airleak    Diagnostics:   CBC with Differential:    Lab Results   Component Value Date/Time    WBC 10.2 04/23/2023 04:20 AM    RBC 4.14 04/23/2023 04:20 AM    HGB 11.7 04/23/2023 04:20 AM    HCT 35.8 04/23/2023 04:20 AM     04/23/2023 04:20 AM    MCV 86.6 04/23/2023 04:20 AM    MCH 28.3 04/23/2023 04:20 AM    MCHC 32.7 04/23/2023 04:20 AM    RDW 15.2 04/23/2023 04:20 AM    NRBC CANCELED 07/22/2011 12:00 PM    NRBC CANCELED 07/22/2011 12:00 PM    SEGSPCT 47.9 07/22/2011 12:00 PM    BANDSPCT CANCELED 07/22/2011 12:00 PM    BLASTSPCT CANCELED 07/22/2011 12:00 PM    METASPCT CANCELED 07/22/2011 12:00 PM    LYMPHOPCT 26.6 03/15/2023 09:20 AM    PROMYELOPCT CANCELED 07/22/2011 12:00 PM    MONOPCT 8.2 03/15/2023 09:20 AM    MYELOPCT CANCELED 07/22/2011 12:00 PM    EOSPCT 5.1 07/22/2011 12:00 PM    BASOPCT 0.5 03/15/2023 09:20 AM    MONOSABS 0.7 03/15/2023 09:20 AM    LYMPHSABS 2.3 03/15/2023 09:20 AM    EOSABS 0.3 03/15/2023 09:20 AM    BASOSABS 0.0 03/15/2023 09:20 AM    DIFFTYPE Auto-K 02/10/2010 12:35 PM     CMP:    Lab Results   Component Value Date/Time     04/23/2023 04:20 AM    K 4.2 04/23/2023 04:20 AM    CL 99 04/23/2023 04:20 AM

## 2023-04-25 ENCOUNTER — CARE COORDINATION (OUTPATIENT)
Dept: CASE MANAGEMENT | Age: 83
End: 2023-04-25

## 2023-04-25 NOTE — CARE COORDINATION
[facility] Care Transitions Initial Follow Up Call    Call within 2 business days of discharge: Yes    Patient Current Location:  Home: [place] [address]    Care Transition Nurse contacted the patient by telephone to perform post hospital discharge assessment. Verified name and  with patient as identifiers. Provided introduction to self, and explanation of the Care Transition Nurse role. Patient: Pinkie Barthel Patient : 1940   MRN: [id number]  Reason for Admission:  LLL pulmonary nodule s/p video assisted thoracoscopic (VAT) with wedge resection    Discharge Date: 23 RARS: Readmission Risk Score: 10.4      Last Discharge [address]       Date Complaint Diagnosis Description Type Department Provider    23  Left lower lobe pulmonary nodule . .. Admission (Discharged) Eneida Monzon MD            Was this an external facility discharge? No Discharge Facility:     Challenges to be reviewed by the provider   Additional needs identified to be addressed with provider: Yes  medications-pantoprazole was not filled at [facility] outpatient pharmacy. Method of communication with provider: phone. Spoke to [facility] at [facility] and confirmed referral was received, but have been unsuccessful in reaching patient. Patient answered call, but request a call back this afternoon  CTN rescheduled call per request    Patient answered call and verified . Patient pleasant and agreeable to transition call. Patient feeling better this afternoon than this morning. Confirmed that she has AVS and reviewed medications. Noted in system. Patient stated she did not get pantoprazole prescription at discharge. CTN placed call to [facility] outpatient pharmacy and stated prescription was not received. Call to [facility] pharmacy and prescription was not called in or received. CTN placed call to [facility] at [phone] and spoke to nurse Jane Cerda.  Prescription was going to be called into

## 2023-04-27 NOTE — CARE COORDINATION
Nonadmit [facility]    Refused services    Nikole Martinez RN, BSN CTN  [facility] [phone]

## 2023-04-28 ENCOUNTER — CARE COORDINATION (OUTPATIENT)
Dept: CASE MANAGEMENT | Age: 83
End: 2023-04-28

## 2023-04-28 NOTE — CARE COORDINATION
[facility] Care Transitions Follow Up Call    Patient Current Location:  Home: [place] [address]    Care Transition Nurse contacted the patient by telephone to follow up after admission. Verified name and  with patient as identifiers. Patient: Lorie Cunningham  Patient : 1940   MRN: [id number]  Reason for Admission:  LLL pulmonary nodule s/p video assisted thoracoscopic (VAT) with wedge resection  Discharge Date: 23 RARS: Readmission Risk Score: 10.4      Needs to be reviewed by the provider   Additional needs identified to be addressed with provider: No  none             Method of communication with provider: none. Patient answered call and verified . Patient pleasant and agreeable to transition call. Patient continues to be weak, but breathing is easier. Minimal to no pain. Continues to have [employer]" but slowly going down. Noticed the swelling in her abdomen is less. Taking all medications as directed. CTN offered home care services to assist with strength and endurance assistance. Patient stated \"none of what they do or who pays for it was explained to me\". CTN educated patient further and answered all questions. Stated she would discuss again after she sees the PA next week at the f/u appt. Denied any acute needs at present time. Agreeable to f/u calls. Educated on the use of urgent care or physicians 24 hr access line if assistance is needed after hours. Addressed changes since last contact:  none  Discussed follow-up appointments. If no appointment was previously scheduled, appointment scheduling offered: Yes. Is follow up appointment scheduled within 7 days of discharge? Yes.     Follow Up  Future Appointments   Date Time Provider Jb Mccray   2023  9:00 AM Toney Merida AND CT SURG [facility]   5/15/2023  1:30 PM Jolie Rayo PA-C AND ORTH[facility]   2023 11:00 AM MD VELVET [place] - [facility]   2023  1:30 PM Arely Diaz

## 2023-05-03 ENCOUNTER — CARE COORDINATION (OUTPATIENT)
Dept: CASE MANAGEMENT | Age: 83
End: 2023-05-03

## 2023-05-03 NOTE — CARE COORDINATION
[facility] Care Transitions Follow Up Call    Patient: Juanito Hunt  Patient : 1940   MRN: [id number]  Reason for Admission:  LLL pulmonary nodule s/p video assisted thoracoscopic (VAT) with wedge resection  Discharge Date: 23 RARS: Readmission Risk Score: 10.4    Attempted to reach patient via phone for transition call. Line rang with no option to l/m    .     Follow Up  Future Appointments   Date Time Provider Jb Mccray   5/15/2023  1:30 PM Ctra. Sigifredo Pan, PA-C AND ORTHO MMA   2023 11:00 AM MD VELVET Black [facility] [place] - DYD   2023  1:30 PM Radha Chase MD AND PULM [facility]      Care Transitions Subsequent and Final Call    Subsequent and Final Calls  Care Transitions Interventions  Other Interventions:               Edan Beltran RN

## 2023-05-04 ENCOUNTER — CARE COORDINATION (OUTPATIENT)
Dept: CASE MANAGEMENT | Age: 83
End: 2023-05-04

## 2023-05-04 NOTE — CARE COORDINATION
[facility] Care Transitions Follow Up Call    Patient: Shireen Beltre  Patient : 1940   MRN: [id number]  Reason for Admission: LLL pulmonary nodule s/p video assisted thoracoscopic (VAT) with wedge resection  Discharge Date: 23 RARS: Readmission Risk Score: 10.4    Spoke with [facility] 2nd attempted outreach. Unable to leave a message. The first contact number does not go through. And the mobile phone a recording stated that wireless customer is not available. We will try patient again one more time on another day. Crow Vidal LPN  Care Coordinator     Follow Up  Future Appointments   Date Time Provider Jb Mcc[place]   5/15/2023  1:30 PM Ctra. Sigifredo Fortune [address] [place] [address] AND ORTHO MMA   2023 11:00 AM Norma Multani MD [facility] [place] - DYD   [facility]30 PM [facility]       Care Transitions Subsequent and Final Call    Subsequent and Final Calls  Care Transitions Interventions  Other Interventions:             LPN Care Coordinator provided contact information for future needs. Plan for follow-up call in 3-5 days based on severity of symptoms and risk factors.   Plan for next call: symptom management-,  self management-.    Crow Vidal LPN

## 2023-05-09 DIAGNOSIS — E78.5 DYSLIPIDEMIA WITH HIGH LDL AND LOW HDL: ICD-10-CM

## 2023-05-09 DIAGNOSIS — E78.1 HYPERTRIGLYCERIDEMIA: Chronic | ICD-10-CM

## 2023-05-09 RX ORDER — ATORVASTATIN CALCIUM 40 MG/1
40 TABLET, FILM COATED ORAL DAILY
Qty: 90 TABLET | Refills: 3 | Status: SHIPPED | OUTPATIENT
Start: 2023-05-09

## 2023-05-09 NOTE — TELEPHONE ENCOUNTER
Patient last seen 4/18/2023. Refill for Lipitor pended.    No follow up scheduled      Latest Reference Range & Units 12/27/22 11:31   CHOLESTEROL, TOTAL, 536686 0 - 199 mg/dL 144   HDL Cholesterol 40 - 60 mg/dL 36 (L)   LDL Calculated <100 mg/dL 59   Triglycerides 0 - 150 mg/dL 246 (H)   VLDL Cholesterol Calculated Not Established mg/dL 49   (L): Data is abnormally low  (H): Data is abnormally high

## 2023-05-10 ENCOUNTER — CARE COORDINATION (OUTPATIENT)
Dept: CARE COORDINATION | Age: 83
End: 2023-05-10

## 2023-05-10 ENCOUNTER — CARE COORDINATION (OUTPATIENT)
Dept: CASE MANAGEMENT | Age: 83
End: 2023-05-10

## 2023-05-10 DIAGNOSIS — J44.9 CHRONIC OBSTRUCTIVE PULMONARY DISEASE, UNSPECIFIED COPD TYPE (HCC): Primary | ICD-10-CM

## 2023-05-10 DIAGNOSIS — I10 ESSENTIAL HYPERTENSION: Chronic | ICD-10-CM

## 2023-05-10 NOTE — PROGRESS NOTES
Remote Patient Monitoring Treatment Plan    Received request from Care Management Team to order remote patient monitoring for in home monitoring of COPD and HTN and order completed. Order request for RPM     Market: [place]   Kit requested: complete kit   Care Plan preset: COPD and HTN   BP cuff size requested: regular (9.05\"-15.74\")   Weight Scale requested: regular (<330lbs)       Patient will be monitoring blood pressure   pulse ox   weight  survey questions. Patient will engage in Remote Patient Monitoring each day to develop the skills necessary for self management. RPM Care Team Responsibilities:   Alerts will be reviewed daily and addressed within 2-4 hours during operational hours (Monday -Friday 9 am-4 pm)  Alert response and intervention documented in patient medical record  Alert response escalated to PCP per protocol and documented in patient medical record  Patient monitored over approximately  days  Discharge from program based on self-management readiness    See care coordination encounters for additional details. Thank you,    Lexy Valles M.D., M.P.H  [address] [place] [address] Remote Patient Monitoring Provider   [facility]   [email or web address]   Phone: [phone]  Fax: [phone]       --Haresh Andrea MD on 5/10/2023 at 6:38 PM    An electronic signature was used to authenticate this note.

## 2023-05-10 NOTE — CARE COORDINATION
previously scheduled, appointment scheduling offered: Yes. Is follow up appointment scheduled within 7 days of discharge? No.    Follow Up  Future Appointments   Date Time Provider Jb Mccray   5/15/2023  1:30 PM Jolie Mejia PA-C AND ORTHO IAN   7/11/2023 11:00 AM MD VELVET Solano FP [place] - DYD   9/27/2023  1:30 PM Rocio Ness MD AND PULM MMA     [facility]s):     Care Transition Nurse reviewed medical action plan with patient and discussed any barriers to care and/or understanding of plan of care after discharge. Discussed appropriate site of care based on symptoms and resources available to patient including: PCP  Specialist  When to call 911. The patient agrees to contact the PCP office for questions related to their healthcare. Advance Care Planning:   reviewed and current. Code Status: Full Code   Patient's Primary Decision Maker is: Legal Next of Kin    Primary Decision Maker: Nerissa Platt - Brother/Sister - [phone]    Patients top risk factors for readmission: functional physical ability  Interventions to address risk factors: Education of patient/family/caregiver/guardian to support self-management-RPM    Offered patient enrollment in the Remote Patient Monitoring (RPM) program for in-home monitoring: Yes, patient enrolled:     Remote Patient Monitoring Enrollment Note      Date/Time: 5/10/2023 12:08 PM    Offered patient enrollment in the [facility] Remote Patient Monitoring (RPM) program for in home monitoring for COPD and HTN. Patient accepted RPM services. Patient will be monitoring the following daily:  blood pressure reading, pulse ox heart rate, and weight    CTN reviewed the information below with patient:    Emergency Contact (name and contact number): neighborAmy [phone]    [] A member from the care coordination team will reach out to notify the patient once the RPM kit is ordered.    [x] Once the kit is

## 2023-05-10 NOTE — CARE COORDINATION
Remote Patient Kit Ordering Note      Date/Time:  5/10/2023 1:09 PM      [x] CCSS confirmed patient shipping address  [x] Patient will receive package over the next 2-4 business days. Someone 21 years or older must be present to sign for UPS delivery. [x] Patient to contact virtual installation-specific phone number listed in the patient instructions. [x] If the patient does not contact HRS within 24 hours, an [employer] [facility] will call the patient directly: If the patient does not answer, HRS will follow up with the clinical team notifying them about the unsuccessful attempt to contact the patient. HRS will make three call attempts to the patient. [x] CTN will contact patient once equipment is active to welcome them to the program.                                                         [x] Hours of RPM monitoring - Monday-Friday [id number]                     All questions answered at this time. CTN made aware the RPM kit has been ordered. CCSS notified patient of RPM equipment order.

## 2023-05-15 ENCOUNTER — OFFICE VISIT (OUTPATIENT)
Dept: ORTHOPEDIC SURGERY | Age: 83
End: 2023-05-15
Payer: MEDICARE

## 2023-05-15 ENCOUNTER — CARE COORDINATION (OUTPATIENT)
Dept: CASE MANAGEMENT | Age: 83
End: 2023-05-15

## 2023-05-15 VITALS — BODY MASS INDEX: 33.26 KG/M2 | WEIGHT: 165 LBS | HEIGHT: 59 IN

## 2023-05-15 DIAGNOSIS — M51.36 DDD (DEGENERATIVE DISC DISEASE), LUMBAR: ICD-10-CM

## 2023-05-15 DIAGNOSIS — M54.16 LUMBAR RADICULITIS: ICD-10-CM

## 2023-05-15 DIAGNOSIS — G89.4 CHRONIC PAIN SYNDROME: ICD-10-CM

## 2023-05-15 PROCEDURE — 99213 OFFICE O/P EST LOW 20 MIN: CPT | Performed by: PHYSICIAN ASSISTANT

## 2023-05-15 PROCEDURE — G8400 PT W/DXA NO RESULTS DOC: HCPCS | Performed by: PHYSICIAN ASSISTANT

## 2023-05-15 PROCEDURE — G8427 DOCREV CUR MEDS BY ELIG CLIN: HCPCS | Performed by: PHYSICIAN ASSISTANT

## 2023-05-15 PROCEDURE — G8417 CALC BMI ABV UP PARAM F/U: HCPCS | Performed by: PHYSICIAN ASSISTANT

## 2023-05-15 PROCEDURE — 1090F PRES/ABSN URINE INCON ASSESS: CPT | Performed by: PHYSICIAN ASSISTANT

## 2023-05-15 PROCEDURE — 1111F DSCHRG MED/CURRENT MED MERGE: CPT | Performed by: PHYSICIAN ASSISTANT

## 2023-05-15 PROCEDURE — 1036F TOBACCO NON-USER: CPT | Performed by: PHYSICIAN ASSISTANT

## 2023-05-15 PROCEDURE — 1123F ACP DISCUSS/DSCN MKR DOCD: CPT | Performed by: PHYSICIAN ASSISTANT

## 2023-05-15 RX ORDER — HYDROCODONE BITARTRATE AND ACETAMINOPHEN 5; 325 MG/1; MG/1
1 TABLET ORAL PRN
Qty: 30 TABLET | Refills: 0 | Status: SHIPPED | OUTPATIENT
Start: 2023-06-30 | End: 2023-07-30

## 2023-05-15 RX ORDER — HYDROCODONE BITARTRATE AND ACETAMINOPHEN 5; 325 MG/1; MG/1
1 TABLET ORAL PRN
Qty: 30 TABLET | Refills: 0 | Status: SHIPPED | OUTPATIENT
Start: 2023-06-01 | End: 2023-07-01

## 2023-05-15 RX ORDER — HYDROCODONE BITARTRATE AND ACETAMINOPHEN 5; 325 MG/1; MG/1
1 TABLET ORAL PRN
Qty: 30 TABLET | Refills: 0 | Status: SHIPPED | OUTPATIENT
Start: 2023-07-29 | End: 2023-08-28

## 2023-05-15 NOTE — PROGRESS NOTES
SPINE: Follow Up     CHIEF COMPLAINT: Chronic low back pain, follow-up    HISTORY OF PRESENT ILLNESS:                The patient is a 82 y.o. female well known to me, here for medication maintenance for chronic low back and right leg pain. She reports chronic aching low back pain. Periodically pain will radiate into the right lateral thigh/calf. Her low back pain is still most bothersome. Her pain is increased with prolonged activity bending lifting. She still reports relief with rest and heat. She recently underwent left lower lobectomy with Dr. Diannah Najjar for lung cancer. She is pending consultation with oncology, Dr. Brandon Kelsey. Low back pain is well managed with Norco 5/325 I po qd PRN without side effects. Medication does allow her to be more functional--able to perform ADLs independently, travel. She typically travels to [place] throughout the year. She is able to sleep >6hr/night, depending on her cats. She is able to care for her 9 cats. She denies any progressive numbness tingling or weakness. No recent fevers chills or infections. Pain overall chronic and stable. Current/Past Treatment:   Physical Therapy: YES  Chiropractic: no  Injection: Multiple prior injections & procedures: ESIs, RFN: RICO Fernnadez [place] hip inj--Dr. Sean Royal  Medications: Norco 5mg I po qd PRN, prior oral steroids, NSAIDs, post-op percocet (Dr. Shelia Gastelum)    Surgery/Consult: h/o lumbar decompression, Dr. Nkechi Godoy the pain medication improve your ability to do:   Personal care: Yes  Housework: Yes   Physical activity: Yes  Social activity: Yes     Pain Scale: 1-10  With Meds:  0/10  Pain Scale: 1-10 Without Meds: 8-9/10     Potential aberrant drug-related behavior:  Aberrant behavior identified? NO  Potential aberrant behavior identified? NO  Reports loss are stolen prescriptions? NO  Insist on certain medications by name? NO  Purposeful oversedation? NO  Increased dose without authorization?  NO     Check

## 2023-05-15 NOTE — CARE COORDINATION
[facility] Care Transitions Follow Up Call      Patient: Mana Smith  Patient : 1940   MRN: [id number]  Reason for Admission:   LLL pulmonary nodule s/p video assisted thoracoscopic (VAT) with wedge resection  Discharge Date: 23 RARS: Readmission Risk Score: 10.4      Attempted to reach patient via phone for transition call. VM left stating purpose of call along with my contact information requesting a return call.     Follow Up  Future Appointments   Date Time Provider Jb Mccray   2023 11:00 AM Mary Lou Lee MD [facility] FP [place] - DYD   2023  1:30 PM Kristi Benavides PA-C AND ORTHO MMA   2023  1:30 PM Biib Nunn MD AND PULM IAN        Care Transitions Subsequent and Final Call    Subsequent and Final Calls  Care Transitions Interventions  Other Interventions:             Goldy Tomas RN

## 2023-05-18 ENCOUNTER — CARE COORDINATION (OUTPATIENT)
Dept: CARE COORDINATION | Age: 83
End: 2023-05-18

## 2023-05-18 ENCOUNTER — CARE COORDINATION (OUTPATIENT)
Dept: CASE MANAGEMENT | Age: 83
End: 2023-05-18

## 2023-05-18 NOTE — CARE COORDINATION
Attempted to reach pt regarding RPM- she has a weight and pulse ox in but no BP readings, ACM was also making an outreach and states her BP cuff is not working but it doesn't appear she has been installed yet.  Will monitor

## 2023-05-18 NOTE — CARE COORDINATION
Remote Patient Monitoring Welcome Note Date/Time: 2023 3:41 PM Patient Current Location: Home: [address] [facility] [address] Verified patients name and  as identifiers. Completed and confirmed the following: Emergency Contact: neighborCassandra [phone] [x] Patient received all RPM equipment (tablet, scale, blood pressure device and cuff, and pulse oximeter)  Cuff Size: regular (9.05\"-15.74\")  Weight Scale: regular (<330lbs) [x] Instructed patient keep box for use when returning equipment [x] Reviewed Patient Welcome Letter with patient [x] Reviewed expectations for patient and care team  Monitoring hours M-F 9-4pm  Completing monitoring by 12pm on  so that alerts can be responded to in the same day  Patient weighs self at same time every day (or after urinating and waking up)  Take blood pressure 1-2 hrs after medications  RPM team may have different phone area code (including [place], [place], [facility] [address] or [address] [facility] [address])                        [x] Instructed patient to keep scale on flat surface [x] Instructed patient to keep tablet plugged in at all times [x] Instructed how to contact IT support ([phone]) [x] Provided Remote Patient Monitoring care  information All questions answered at this time. Patient stated that blood pressure cuff is not working and called IT department.  She was instructed that a new BP monitor would be sent to her home. ---- Current Patient Metrics ---- Blood Pressure: -/-, -bpm Pulseox: 92%, 76bpm Survey: - Weight: 162.2lbs Note Created at: 2023 03:55 PM ET ---- Time-Spent: 10 minutes 0 seconds  Ane Boast, RN   [phone]

## 2023-05-18 NOTE — CARE COORDINATION
[facility] Care Transitions Follow Up Call    Patient Current Location:  Home: [address] [place] [address]    Care Transition Nurse contacted the patient by telephone to follow up after admission. Verified name and  with patient as identifiers. Patient: Ursula Mcadams  Patient : 1940   MRN: [id number]  Reason for Admission:  LLL pulmonary nodule s/p video assisted thoracoscopic (VAT) with wedge resection  Discharge Date: 23 RARS: Readmission Risk Score: 10.4      Needs to be reviewed by the provider   Additional needs identified to be addressed with provider: No  none             Method of communication with provider: none. Patient answered call and verified . Patient pleasant and agreeable to transition call. . patient is feeling \"much better\". Patient is getting stronger and breathing easier. Denied any CP or SOB. Incision healed up. Patient has recevied RPM equipment and has started using devices. Stated that BP cuff is not working and has reached out to IT team and new cuff to be sent to her home. Denied any acute needs at present time. Agreeable to f/u calls. Educated on the use of urgent care or physicians 24 hr access line if assistance is needed after hours. Addressed changes since last contact:  none   Discussed follow-up appointments. If no appointment was previously scheduled, appointment scheduling offered: Yes. Is follow up appointment scheduled within 7 days of discharge? Yes. Follow Up  Future Appointments   Date Time Provider Jb cMcray   2023 11:00 AM Skye Mendez MD [facility] FP [place] - DYD   2023  1:30 PM Jolie Garber PA-C AND ORTHO MMA   2023  1:30 PM Ciarra Auguste MD AND PULM IAN     Non-[facility] follow up appointment(s):     Care Transition Nurse reviewed medical action plan with patient and discussed any barriers to care and/or understanding of plan of care after discharge.  Discussed appropriate site of care based on

## 2023-05-25 ENCOUNTER — CARE COORDINATION (OUTPATIENT)
Dept: CARE COORDINATION | Age: 83
End: 2023-05-25

## 2023-05-25 ENCOUNTER — CARE COORDINATION (OUTPATIENT)
Dept: CASE MANAGEMENT | Age: 83
End: 2023-05-25

## 2023-05-25 DIAGNOSIS — E11.69 TYPE 2 DIABETES MELLITUS WITH OTHER SPECIFIED COMPLICATION, WITHOUT LONG-TERM CURRENT USE OF INSULIN (HCC): ICD-10-CM

## 2023-05-25 RX ORDER — DAPAGLIFLOZIN 10 MG/1
TABLET, FILM COATED ORAL
Qty: 90 TABLET | Refills: 1 | Status: SHIPPED | OUTPATIENT
Start: 2023-05-25

## 2023-05-25 NOTE — TELEPHONE ENCOUNTER
Helga Schirmer [phone] (home)    is requesting refill(s) of medication Clyde Angulo to preferred pharmacy [facility] 4/4/23 (pertaining to medication)   Last refill 11/23/22 (per medication requested)  Next office visit scheduled or attempted Yes  Date 7/11/23  If No, reason

## 2023-05-25 NOTE — CARE COORDINATION
Remote Alert Monitoring Note      Date/Time:  2023 1:00 PM  Patient Current Location: [facility]    LPN contacted patient by telephone regarding red alert received for pulse ox reading (90%). Verified patients name and  as identifiers. Background: Pt enrolled for COPD and HTN    Refer to 911 immediately if:  Patient unresponsive or unable to provide history  Change in cognition or sudden confusion  Patient unable to respond in complete sentences  Intense chest pain/tightness  Any concern for any clinical emergency  Red Alert: Provider response time of 1 hr required for any red alert requiring intervention  Yellow Alert: Provider response time of 3hr required for any escalated yellow alert    O2 Triage  Are you having any Chest Pain? no   Are you having any Shortness of Breath? no   Swelling in your hands or feet? no     Are you having any other health concerns or issues? no       Clinical Interventions:  spoke with patient, she repeated  the oxygen but it is registering at 91%, she reports she is not SOB, she is speaking in full sentences. The device may a bit loose on her finger. She did get a replacement kit- gave pt the contact number to HRS to install the 2nd kit. Plan/Follow Up: Will continue to review, monitor and address alerts with follow up based on severity of symptoms and risk factors.

## 2023-05-25 NOTE — CARE COORDINATION
[facility] Care Transitions Follow Up Call    Patient Current Location:  Home: [address] [place] [address]    Care Transition Nurse contacted the patient by telephone to follow up after admission. Verified name and  with patient as identifiers. Patient: Caron Claude  Patient : 1940   MRN: [id number]  Reason for Admission:  LLL pulmonary nodule s/p video assisted thoracoscopic (VAT) with wedge resection  Discharge Date: 23 RARS: Readmission Risk Score: 10.4      Needs to be reviewed by the provider   Additional needs identified to be addressed with provider: No  none             Method of communication with provider: none. Patient answered call and verified . Patient pleasant and agreeable to transition call. Patient continues to be getting better and feeling well. Patient continues to get stronger and breathing easy. Patient stated she was going to contact surgeon about a couple questions. Stated that her abdomen is still numb and feels like something is moving around inside. Stated that sensation is not normal. Confirmed that she has surgeon's office and going to call today. CTN reviewed transition period and informed that support would be transferred to [facility], [facility] at [address] [place] [address] (Dr Lizbet Lewis) d/t continued enrollment to [facility] program. Stated understanding. Patient stated that new BP cuff was received yesterday and will be using new cuff today. Denied any acute needs at present time. Agreeable to f/u calls. Educated on the use of urgent care or physicians 24 hr access line if assistance is needed after hours. Addressed changes since last contact:  tele monitoring-[facility] to continue  Discussed follow-up appointments. If no appointment was previously scheduled, appointment scheduling offered: Yes. Is follow up appointment scheduled within 7 days of discharge?  No.    Follow Up  Future Appointments   Date Time Provider Jb Mccray   2023 11:00 AM

## 2023-05-26 ENCOUNTER — CARE COORDINATION (OUTPATIENT)
Dept: CASE MANAGEMENT | Age: 83
End: 2023-05-26

## 2023-05-26 NOTE — CARE COORDINATION
Remote Alert Monitoring Note      Date/Time:  2023 1:13 PM  Patient Current Location: Home: [address] [place] [address]    LPN contacted patient by telephone regarding red alert received for pulse ox reading (90%). Verified patients name and  as identifiers. Background: Patient is enrolled in RPM r/t HTN, COPD  Refer to 911 immediately if:  Patient unresponsive or unable to provide history  Change in cognition or sudden confusion  Patient unable to respond in complete sentences  Intense chest pain/tightness  Any concern for any clinical emergency  Red Alert: Provider response time of 1 hr required for any red alert requiring intervention  Yellow Alert: Provider response time of 3hr required for any escalated yellow alert    Clinical Interventions:  Patient states she is doing fine. Hands were cool this morning when she checked it. Denies SOB, cough, HA, dizziness, lightheadedness. LPN educated patient to warm up hands and recheck if SPO2 less than 92%. Patient verbalized understanding and was agreeable to recheck while on the phone. SPO2 was 92%. Denies needs. Plan/Follow Up: Will continue to review, monitor and address alerts with follow up based on severity of symptoms and risk factors.

## 2023-05-30 ENCOUNTER — CARE COORDINATION (OUTPATIENT)
Dept: CARE COORDINATION | Age: 83
End: 2023-05-30

## 2023-05-31 NOTE — CARE COORDINATION
I agree with the Bari Lancaster
concern to patient   Are the patients physical health problems impacting on their mental well-being?: Mild impact on mental well-being e.g. \"\"feeling fed-up\"\", \"\"reduced enjoyment\"\"   Are there any problems with your patients lifestyle behaviors (alcohol, drugs, diet, exercise) that are impacting on physical or mental well-being?: No identified areas of concern   Do you have any other concerns about your patients mental well-being? How would you rate their severity and impact on the patient?: No identified areas of concern   How would you rate their home environment in terms of safety and stability (including domestic violence, insecure housing, neighbor harassment)?: Consistently safe, supportive, stable, no identified problems   How do daily activities impact on the patient's well-being? (include current or anticipated unemployment, work, caregiving, access to transportation or other): No identified problems or perceived positive benefits   How would you rate their social network (family, work, friends)?: Good participation with social networks   How would you rate their financial resources (including ability to afford all required medical care)?: Financially secure, resources adequate, no identified problems   How wells does the patient now understand their health and well-being (symptoms, signs or risk factors) and what they need to do to manage their health?: Reasonable to good understanding and already engages in managing health or is willing to undertake better management   How well do you think your patient can engage in healthcare discussions? (Barriers include language, deafness, aphasia, alcohol or drug problems, learning difficulties, concentration): Clear and open communication, no identified barriers   Do other services need to be involved to help this patient?: Other care/services not required at this time   Are current services involved with this patient well-coordinated?  (Include coordination

## 2023-06-01 ENCOUNTER — CARE COORDINATION (OUTPATIENT)
Dept: CASE MANAGEMENT | Age: 83
End: 2023-06-01

## 2023-06-01 NOTE — CARE COORDINATION
Remote Patient Monitoring Note      Date/Time:  6/1/2023 12:14 PM  Attempted to reach pt for RPM yellow alert for no metrics entered in 2 days. Left HIPPA compliant message. Background: Enrolled in RPM for HTN, COPD    Plan/Follow Up: Will continue to review, monitor and address alerts with follow up based on severity of symptoms and risk factors.

## 2023-06-02 ENCOUNTER — CARE COORDINATION (OUTPATIENT)
Dept: CASE MANAGEMENT | Age: 83
End: 2023-06-02

## 2023-06-02 NOTE — CARE COORDINATION
Remote Alert Monitoring Note      Date/Time:  2023 1:58 PM  Patient Current Location: Home: [address] [place] [address]    RN  contacted patient by telephone regarding red alert received for blood pressure reading (185/91). Verified patients name and  as identifiers. Background:   RPM for HTN and COPD      Clinical Interventions:  called pt back to check on new BP reading. Pt states it was 135/70 but forgot to turn on tablet. Will enter readings. Plan/Follow Up: Will continue to review, monitor and address alerts with follow up based on severity of symptoms and risk factors.       Current Patient Metrics ---- Blood Pressure: 185/91, 70bpm Pulseox: 94%, 72bpm Survey: - Weight: 160.4lbs Note Created at: 2023 02:00 PM ET ---- Time-Spent: 5 minutes 0 seconds
---- Blood Pressure: 185/91, 70bpm Pulseox: 94%, 72bpm Survey: - Weight: 160.4lbs Note Created at: 06/02/2023 10:30 AM ET ---- Time-Spent: 6 minutes 0 seconds

## 2023-06-06 ENCOUNTER — CARE COORDINATION (OUTPATIENT)
Dept: CARE COORDINATION | Age: 83
End: 2023-06-06

## 2023-06-06 NOTE — CARE COORDINATION
Remote Alert Monitoring Note      Date/Time:  2023 12:43 PM  Patient Current Location: [facility]    LPN contacted patient by telephone regarding red alert received for pulse ox reading (91%). Verified patients name and  as identifiers. Challenges to be reviewed by the provider   Additional needs identified to be addressed with provider No                  Background: Pt enrolled for HTN and COPD     Refer to 911 immediately if:  Patient unresponsive or unable to provide history  Change in cognition or sudden confusion  Patient unable to respond in complete sentences  Intense chest pain/tightness  Any concern for any clinical emergency  Red Alert: Provider response time of 1 hr required for any red alert requiring intervention  Yellow Alert: Provider response time of 3hr required for any escalated yellow alert    O2 Triage  Are you having any Chest Pain? no   Are you having any Shortness of Breath? no   Swelling in your hands or feet? no     Are you having any other health concerns or issues? no       Clinical Interventions:  Spoke with patient, she reports her oxygen read 95% but it did not transfer to tablet, she re checked and it was 91% and going up but 91% is what registered. She is speaking in full sentences, she denies SOB and CP and reports she has no concerning sx. Other vitals are WNL      Plan/Follow Up: Will continue to review, monitor and address alerts with follow up based on severity of symptoms and risk factors.

## 2023-06-26 ENCOUNTER — CARE COORDINATION (OUTPATIENT)
Dept: CARE COORDINATION | Age: 83
End: 2023-06-26

## 2023-06-28 ENCOUNTER — CARE COORDINATION (OUTPATIENT)
Dept: CARE COORDINATION | Age: 83
End: 2023-06-28

## 2023-06-29 ENCOUNTER — CARE COORDINATION (OUTPATIENT)
Dept: CASE MANAGEMENT | Age: 83
End: 2023-06-29

## 2023-07-05 ENCOUNTER — CARE COORDINATION (OUTPATIENT)
Dept: CARE COORDINATION | Age: 83
End: 2023-07-05

## 2023-07-05 NOTE — CARE COORDINATION
Ambulatory Care Coordination Note  2023    Patient Current Location:  Home:  [facility] [address]     ACM contacted the patient by telephone. Verified name and  with patient as identifiers. Provided introduction to self, and explanation of the ACM role. Challenges to be reviewed by the provider   Additional needs identified to be addressed with provider: No  none               Method of communication with provider: none. ACM: Sera Ortiz, RN    ACM made care coordination outreach to patient. Patient stated that she is doing \"good. \"  Patient denied any chest pain, shortness of breath or swelling at this time. ACM reviewed recently RPM readings with patient and reviewed alerts. ACM reviewed zone tools and fall education with patient. Patient thanked ACM for the aDealio. \" Patient stated no other questions or concerns at the end of the call. ACM will follow up at a later time. Plan   Review RPM   Follow up BS   Follow up 3 weeks     Offered patient enrollment in the Remote Patient Monitoring (RPM) program for in-home monitoring: Yes, patient already enrolled. Lab Results       None            Care Coordination Interventions    Referral from Primary Care Provider: No  Suggested Interventions and Community Resources  Fall Risk Prevention: In Process (Comment: mailed )  Transportation Support: Declined  Zone Management Tools:  In Process (Comment: mailked )          Goals Addressed                   This Visit's Progress     Reduce Falls    On track     I will reduce my risk of falls by the following: Remove rugs or use non slip rugs  Use walking aids like cane or walker    Barriers: overwhelmed by complexity of regimen  Plan for overcoming my barriers: education and support  Confidence: 8/10  Anticipated Goal Completion Date: 23              Future Appointments   Date Time Provider [address]   2023 11:00 AM Cecilio Owens MD [facility]

## 2023-07-11 ENCOUNTER — CARE COORDINATION (OUTPATIENT)
Dept: CARE COORDINATION | Age: 83
End: 2023-07-11

## 2023-07-11 ENCOUNTER — OFFICE VISIT (OUTPATIENT)
Dept: FAMILY MEDICINE CLINIC | Age: 83
End: 2023-07-11

## 2023-07-11 VITALS
BODY MASS INDEX: 32.74 KG/M2 | DIASTOLIC BLOOD PRESSURE: 60 MMHG | WEIGHT: 162.4 LBS | HEART RATE: 66 BPM | SYSTOLIC BLOOD PRESSURE: 138 MMHG | OXYGEN SATURATION: 99 % | HEIGHT: 59 IN

## 2023-07-11 DIAGNOSIS — E11.69 TYPE 2 DIABETES MELLITUS WITH OTHER SPECIFIED COMPLICATION, WITHOUT LONG-TERM CURRENT USE OF INSULIN (HCC): Primary | ICD-10-CM

## 2023-07-11 DIAGNOSIS — F11.20 OPIOID DEPENDENCE WITH CURRENT USE (HCC): ICD-10-CM

## 2023-07-11 DIAGNOSIS — I25.84 CORONARY ARTERY DISEASE DUE TO CALCIFIED CORONARY LESION: ICD-10-CM

## 2023-07-11 DIAGNOSIS — E78.5 DYSLIPIDEMIA WITH HIGH LDL AND LOW HDL: ICD-10-CM

## 2023-07-11 DIAGNOSIS — I25.10 CORONARY ARTERY DISEASE DUE TO CALCIFIED CORONARY LESION: ICD-10-CM

## 2023-07-11 DIAGNOSIS — J44.9 CHRONIC OBSTRUCTIVE PULMONARY DISEASE, UNSPECIFIED COPD TYPE (HCC): ICD-10-CM

## 2023-07-11 DIAGNOSIS — I10 ESSENTIAL HYPERTENSION: ICD-10-CM

## 2023-07-11 DIAGNOSIS — C34.90 NON-SMALL CELL LUNG CANCER, UNSPECIFIED LATERALITY (HCC): ICD-10-CM

## 2023-07-11 LAB
ALBUMIN SERPL-MCNC: 4.7 G/DL (ref 3.4–5)
ALBUMIN/GLOB SERPL: 1.6 {RATIO} (ref 1.1–2.2)
ALP SERPL-CCNC: 62 U/L (ref 40–129)
ALT SERPL-CCNC: 13 U/L (ref 10–40)
ANION GAP SERPL CALCULATED.3IONS-SCNC: 14 MMOL/L (ref 3–16)
AST SERPL-CCNC: 12 U/L (ref 15–37)
BILIRUB SERPL-MCNC: 0.4 MG/DL (ref 0–1)
BUN SERPL-MCNC: 14 MG/DL (ref 7–20)
CALCIUM SERPL-MCNC: 10.2 MG/DL (ref 8.3–10.6)
CHLORIDE SERPL-SCNC: 97 MMOL/L (ref 99–110)
CHOLEST SERPL-MCNC: 164 MG/DL (ref 0–199)
CO2 SERPL-SCNC: 26 MMOL/L (ref 21–32)
CREAT SERPL-MCNC: 0.5 MG/DL (ref 0.6–1.2)
GFR SERPLBLD CREATININE-BSD FMLA CKD-EPI: >60 ML/MIN/{1.73_M2}
GLUCOSE SERPL-MCNC: 114 MG/DL (ref 70–99)
HBA1C MFR BLD: 6.8 %
HDLC SERPL-MCNC: 39 MG/DL (ref 40–60)
LDLC SERPL CALC-MCNC: 85 MG/DL
POTASSIUM SERPL-SCNC: 4.6 MMOL/L (ref 3.5–5.1)
PROT SERPL-MCNC: 7.6 G/DL (ref 6.4–8.2)
SODIUM SERPL-SCNC: 137 MMOL/L (ref 136–145)
TRIGL SERPL-MCNC: 198 MG/DL (ref 0–150)
VLDLC SERPL CALC-MCNC: 40 MG/DL

## 2023-07-11 RX ORDER — VALACYCLOVIR HYDROCHLORIDE 1 G/1
1000 TABLET, FILM COATED ORAL EVERY 12 HOURS
Qty: 20 TABLET | Refills: 0 | Status: SHIPPED | OUTPATIENT
Start: 2023-07-11

## 2023-07-11 SDOH — ECONOMIC STABILITY: FOOD INSECURITY: WITHIN THE PAST 12 MONTHS, THE FOOD YOU BOUGHT JUST DIDN'T LAST AND YOU DIDN'T HAVE MONEY TO GET MORE.: NEVER TRUE

## 2023-07-11 SDOH — ECONOMIC STABILITY: HOUSING INSECURITY
IN THE LAST 12 MONTHS, WAS THERE A TIME WHEN YOU DID NOT HAVE A STEADY PLACE TO SLEEP OR SLEPT IN A SHELTER (INCLUDING NOW)?: NO

## 2023-07-11 SDOH — ECONOMIC STABILITY: INCOME INSECURITY: HOW HARD IS IT FOR YOU TO PAY FOR THE VERY BASICS LIKE FOOD, HOUSING, MEDICAL CARE, AND HEATING?: NOT HARD AT ALL

## 2023-07-11 SDOH — ECONOMIC STABILITY: FOOD INSECURITY: WITHIN THE PAST 12 MONTHS, YOU WORRIED THAT YOUR FOOD WOULD RUN OUT BEFORE YOU GOT MONEY TO BUY MORE.: NEVER TRUE

## 2023-07-11 NOTE — CARE COORDINATION
Remote Patient Monitoring Note      Date/Time:  7/11/2023 12:43 PM  Patient Current Location: [address] alert received for blood pressure reading (152/101). Background: Pt enrolled for HTN and COPD    Clinical Interventions:  BP noted, pt had appt with PCP today and BP at OV was WNL at 138/60 and HR of 66. Other vitals are WNL      Plan/Follow Up: Will continue to review, monitor and address alerts with follow up based on severity of symptoms and risk factors.
No

## 2023-07-12 ENCOUNTER — CARE COORDINATION (OUTPATIENT)
Dept: CARE COORDINATION | Age: 83
End: 2023-07-12

## 2023-07-13 VITALS
DIASTOLIC BLOOD PRESSURE: 68 MMHG | WEIGHT: 161.8 LBS | HEART RATE: 75 BPM | SYSTOLIC BLOOD PRESSURE: 128 MMHG | OXYGEN SATURATION: 91 % | BODY MASS INDEX: 32.68 KG/M2

## 2023-07-18 DIAGNOSIS — R07.89 CHEST WALL PAIN: Primary | ICD-10-CM

## 2023-07-19 ENCOUNTER — HOSPITAL ENCOUNTER (OUTPATIENT)
Dept: GENERAL RADIOLOGY | Age: 83
Discharge: HOME | End: 2023-07-19
Payer: MEDICARE

## 2023-07-19 ENCOUNTER — HOSPITAL ENCOUNTER (OUTPATIENT)
Age: 83
Discharge: HOME | End: 2023-07-19
Payer: MEDICARE

## 2023-07-19 DIAGNOSIS — R07.89 CHEST WALL PAIN: ICD-10-CM

## 2023-07-19 PROCEDURE — 71046 X-RAY EXAM CHEST 2 VIEWS: CPT

## 2023-07-21 ENCOUNTER — TELEPHONE (OUTPATIENT)
Dept: PULMONOLOGY | Age: 83
End: 2023-07-21

## 2023-07-25 RX ORDER — HYDROCODONE BITARTRATE AND ACETAMINOPHEN 5; 325 MG/1; MG/1
1 TABLET ORAL PRN
Qty: 30 TABLET | Refills: 0 | Status: CANCELLED | OUTPATIENT
Start: 2023-07-29 | End: 2023-08-28

## 2023-08-02 ENCOUNTER — CARE COORDINATION (OUTPATIENT)
Dept: CARE COORDINATION | Age: 83
End: 2023-08-02

## 2023-08-02 SDOH — ECONOMIC STABILITY: HOUSING INSECURITY: IN THE LAST 12 MONTHS, HOW MANY PLACES HAVE YOU LIVED?: 1

## 2023-08-02 SDOH — ECONOMIC STABILITY: TRANSPORTATION INSECURITY
IN THE PAST 12 MONTHS, HAS THE LACK OF TRANSPORTATION KEPT YOU FROM MEDICAL APPOINTMENTS OR FROM GETTING MEDICATIONS?: NO

## 2023-08-02 SDOH — ECONOMIC STABILITY: INCOME INSECURITY: IN THE LAST 12 MONTHS, WAS THERE A TIME WHEN YOU WERE NOT ABLE TO PAY THE MORTGAGE OR RENT ON TIME?: NO

## 2023-08-02 SDOH — ECONOMIC STABILITY: TRANSPORTATION INSECURITY
IN THE PAST 12 MONTHS, HAS LACK OF TRANSPORTATION KEPT YOU FROM MEETINGS, WORK, OR FROM GETTING THINGS NEEDED FOR DAILY LIVING?: NO

## 2023-08-02 NOTE — CARE COORDINATION
Ambulatory Care Coordination Note  2023    Patient Current Location:  Home:  [facility] [address]     ACM contacted the patient by telephone. Verified name and  with patient as identifiers. Provided introduction to self, and explanation of the ACM role. Challenges to be reviewed by the provider   Additional needs identified to be addressed with provider: No  none               Method of communication with provider: none. ACM: Luis Enrique Rashid RN    ACM made care coordination outreach to patient. Patient pleasant on the phone while conversing with ACM. Patient stated that she is doing \"great. \" ACM reviewed RPM metrics with patient. Patient has not alerted in the past two weeks. Patient denied any chest pain, shortness of breath or swelling at this time. Patient stated that her blood sugars have been \"around 130\" never higher than 140. Patient denied any other questions or concerns at this time. ACM to follow up at a later time. Plan   Review RPM   Follow up BS   Follow up 3 weeks     Offered patient enrollment in the Remote Patient Monitoring (RPM) program for in-home monitoring: Yes, patient already enrolled.     Lab Results       None            Care Coordination Interventions    Referral from Primary Care Provider: No  Suggested Interventions and Community Resources  Fall Risk Prevention: Completed (Comment: mailed )  Transportation Support: Declined  Zone Management Tools: Completed (Comment: mailked )          Goals Addressed                   This Visit's Progress     Reduce Falls    On track     I will reduce my risk of falls by the following: Remove rugs or use non slip rugs  Use walking aids like cane or walker    Barriers: overwhelmed by complexity of regimen  Plan for overcoming my barriers: education and support  Confidence: 8/10  Anticipated Goal Completion Date: 23              Future Appointments   Date Time Provider [address] [place] [address]   2023  1:30

## 2023-08-07 ENCOUNTER — OFFICE VISIT (OUTPATIENT)
Dept: ORTHOPEDIC SURGERY | Age: 83
End: 2023-08-07
Payer: MEDICARE

## 2023-08-07 VITALS — HEIGHT: 59 IN | BODY MASS INDEX: 32.86 KG/M2 | WEIGHT: 163 LBS

## 2023-08-07 DIAGNOSIS — M51.36 DDD (DEGENERATIVE DISC DISEASE), LUMBAR: ICD-10-CM

## 2023-08-07 DIAGNOSIS — G89.4 CHRONIC PAIN SYNDROME: ICD-10-CM

## 2023-08-07 DIAGNOSIS — M54.16 LUMBAR RADICULITIS: ICD-10-CM

## 2023-08-07 PROCEDURE — 1036F TOBACCO NON-USER: CPT | Performed by: PHYSICIAN ASSISTANT

## 2023-08-07 PROCEDURE — G8417 CALC BMI ABV UP PARAM F/U: HCPCS | Performed by: PHYSICIAN ASSISTANT

## 2023-08-07 PROCEDURE — G8427 DOCREV CUR MEDS BY ELIG CLIN: HCPCS | Performed by: PHYSICIAN ASSISTANT

## 2023-08-07 PROCEDURE — G8400 PT W/DXA NO RESULTS DOC: HCPCS | Performed by: PHYSICIAN ASSISTANT

## 2023-08-07 PROCEDURE — 99214 OFFICE O/P EST MOD 30 MIN: CPT | Performed by: PHYSICIAN ASSISTANT

## 2023-08-07 PROCEDURE — 1123F ACP DISCUSS/DSCN MKR DOCD: CPT | Performed by: PHYSICIAN ASSISTANT

## 2023-08-07 PROCEDURE — 1090F PRES/ABSN URINE INCON ASSESS: CPT | Performed by: PHYSICIAN ASSISTANT

## 2023-08-07 PROCEDURE — 80305 DRUG TEST PRSMV DIR OPT OBS: CPT | Performed by: PHYSICIAN ASSISTANT

## 2023-08-07 RX ORDER — HYDROCODONE BITARTRATE AND ACETAMINOPHEN 5; 325 MG/1; MG/1
1 TABLET ORAL PRN
Qty: 30 TABLET | Refills: 0 | Status: SHIPPED | OUTPATIENT
Start: 2023-09-05 | End: 2023-10-05

## 2023-08-07 RX ORDER — HYDROCODONE BITARTRATE AND ACETAMINOPHEN 5; 325 MG/1; MG/1
1 TABLET ORAL PRN
Qty: 30 TABLET | Refills: 0 | Status: SHIPPED | OUTPATIENT
Start: 2023-10-03 | End: 2023-11-02

## 2023-08-07 NOTE — PROGRESS NOTES
reviewed. These include the risk of tolerance, addition, and abuse. Potential side effects were also discussed. Informed verbal consent was obtained. Medications are to be taken as prescribed and not escalated without prior agreement. OAASHKANS/NITA reviewed & appropriate. Opiate medications will only be prescribed through the office of Nighat Lewis PA-C, [facility] unless notified otherwise         Goals of the current treatment regimen include: improvement in pain, improvement in overall in physical performance, ability to perform daily activities, work or disability, emotional distress, health care utilization and decreased medication consumption. Progress will be monitored towards achieving/maintaining therapeutic goals:    1. Improving perceived interference of pain with ADL's, ability to perform HEP, continue to improve in overall flexibility, ROM, strength and endurance, ability to do household activities indoor and/or outdoor, work and social/leisure activities. Improve psychosocial and physical functioning. 2. Improving sleep to 6-7 hours a night. Improve mood/ anxiety and depression symptoms    3. Reduction of reliance on opioid analgesia/more appropriate opioid use. Utilization of adjuvant medications as appropriate. Risks and benefits of the medications and alternative treatments have been discussed with the patient. The patient was advised against drinking alcohol with the opioid pain medicines, advised against driving or handling machinery when starting or adjusting the dose of medicines, feeling groggy or drowsy, or if having any cognitive issues related to the current medications. The patient is fully aware of the risk of potential addiction, overdose and death, if medicines are misused and not taken as prescribed. Planned duration of treatment until patient is able to be functional with less pain. Patient will follow up every 3 months or sooner, if needed.

## 2023-08-21 ENCOUNTER — CARE COORDINATION (OUTPATIENT)
Dept: CARE COORDINATION | Age: 83
End: 2023-08-21

## 2023-08-21 NOTE — CARE COORDINATION
Remote Patient Monitoring Note  Date/Time:  2023 4:47 PM  Patient Current Location: [place]  LPN contacted patient by telephone regarding yellow alert received for no BP or weight x 2 days. Verified patients name and  as identifiers. Background: Pt enrolled in RPM r/t HTN, COPD. Clinical Interventions: Reviewed and followed up on alerts and treatments-Pt agreeable to update metrics later today. Plan/Follow Up: Will continue to review, monitor and address alerts with follow up based on severity of symptoms and risk factors. Detail Level: Zone

## 2023-08-22 DIAGNOSIS — F32.89 OTHER DEPRESSION: ICD-10-CM

## 2023-08-22 RX ORDER — ESCITALOPRAM OXALATE 20 MG/1
TABLET ORAL
Qty: 90 TABLET | Refills: 1 | Status: SHIPPED | OUTPATIENT
Start: 2023-08-22

## 2023-08-22 NOTE — TELEPHONE ENCOUNTER
Love Jacobo is requesting refill(s) lexapro  Last OV 7/11/23 (pertaining to medication)  LR 12/27/22 (per medication requested)  Next office visit scheduled or attempted Yes   If no, reason:  10/18/23

## 2023-08-24 ENCOUNTER — CARE COORDINATION (OUTPATIENT)
Dept: CARE COORDINATION | Age: 83
End: 2023-08-24

## 2023-08-24 ENCOUNTER — TELEPHONE (OUTPATIENT)
Dept: FAMILY MEDICINE CLINIC | Age: 83
End: 2023-08-24

## 2023-08-24 NOTE — TELEPHONE ENCOUNTER
----- Message from Maximo Brink sent at 8/24/2023  3:12 PM EDT -----  Subject: Referral Request    Reason for referral request? Patient is requesting a Referral to   Pulmonologist for follow up from lung cancer surgery. Dr Yosvany Maldonado, who she   was scheduled with, keeps canceling her appts. Patient feels she should be   seen by a doctor. Patient states she is in quite a lot of pain. Please   advise. Thank you   Provider patient wants to be referred to(if known):     Provider Phone Number(if known): Additional Information for Provider?  Patient has Medicare A and B and   Federal [address]  ---------------------------------------------------------------------------  --------------  Kelsi Farmington Kvng    [phone]; OK to leave message on "[employer]il,OK to respond with electronic   message via [employer] portal (only for patients who have registered [employer]   account)  ---------------------------------------------------------------------------  --------------

## 2023-08-24 NOTE — CARE COORDINATION
Patient has completed 101 days of RPM monitoring for HTN and COPD, last outreach was 7/11/23 for alert for metrics     Most recent RPM data is listed below for review

## 2023-08-25 NOTE — TELEPHONE ENCOUNTER
Please call patient and get more information, then call pulmonology office to clarify what happening

## 2023-08-31 ENCOUNTER — CARE COORDINATION (OUTPATIENT)
Dept: CARE COORDINATION | Age: 83
End: 2023-08-31

## 2023-08-31 NOTE — CARE COORDINATION
Ambulatory Care Coordination Note  2023    Patient Current Location:  Home:  [facility] [address]     ACM contacted the patient by telephone. Verified name and  with patient as identifiers. Provided introduction to self, and explanation of the ACM role. Challenges to be reviewed by the provider   Additional needs identified to be addressed with provider: Yes    Patient asking about a  pulmonology appointment and stated she reached out to office on  and did not hear back. Patient reported that she is about to leave her house and requesting a call tomorrow. Method of communication with provider: chart routing. ACM: Nancy Pearl RN    ACM made care coordination outreach to patient. Patient stated that she is doing \"fine. \"  Patient did not submit RPM readings today or yesterday. Patient stated that she will go ahead and submit them as soon as we get off the phone. ACM reviewed RPM program requirements with patient, patient verbalized understanding. Patient denied any chest pain, shortness of breath or swelling. Patient asking about a  pulmonology appointment and stated she reached out to office and did not hear back. ACM to route chart to PCP to follow up. Patient informed ACM that she did not take her blood sugar this morning. Patient denied any other questions or concerns. ACM to follow up at a later date. Plan   Follow up RPM   Follow up BS   Follow up 3 weeks       Offered patient enrollment in the Remote Patient Monitoring (RPM) program for in-home monitoring: Yes, patient already enrolled.     Lab Results       None                 Goals Addressed                   This Visit's Progress     Reduce Falls    On track     I will reduce my risk of falls by the following: Remove rugs or use non slip rugs  Use walking aids like cane or walker    Barriers: overwhelmed by complexity of regimen  Plan for overcoming my barriers: education and

## 2023-09-06 ENCOUNTER — CARE COORDINATION (OUTPATIENT)
Dept: CARE COORDINATION | Age: 83
End: 2023-09-06

## 2023-09-11 ENCOUNTER — CARE COORDINATION (OUTPATIENT)
Dept: CARE COORDINATION | Age: 83
End: 2023-09-11

## 2023-09-11 NOTE — CARE COORDINATION
Remote Patient Monitoring Note      Date/Time:  9/11/2023 4:26 PM  Patient Current Location: [place]  LPN contacted patient by telephone regarding yellow alert received for activity level (no metrics x4 day). Spoke with patient, she reports she has forgotten to obtain metrics but she will do them now. She states she is doing and feeling well       Plan/Follow Up: Will continue to review, monitor and address alerts with follow up based on severity of symptoms and risk factors.

## 2023-09-25 ENCOUNTER — CARE COORDINATION (OUTPATIENT)
Dept: CARE COORDINATION | Age: 83
End: 2023-09-25

## 2023-09-25 DIAGNOSIS — J44.9 CHRONIC OBSTRUCTIVE PULMONARY DISEASE, UNSPECIFIED COPD TYPE (HCC): ICD-10-CM

## 2023-09-25 DIAGNOSIS — I10 ESSENTIAL HYPERTENSION: Primary | Chronic | ICD-10-CM

## 2023-09-25 NOTE — CARE COORDINATION
Ambulatory Care Coordination Note  2023    Patient Current Location:  Home: [address] [place] [id number]     ACM contacted the patient by telephone. Verified name and  with patient as identifiers. Provided introduction to self, and explanation of the ACM role. Challenges to be reviewed by the provider   Additional needs identified to be addressed with provider: No  none               Method of communication with provider: none. ACM: Lenny Coates RN    ACM made care coordination outreach to patient. Patient reported that she is doing \"great. \"  ACM reviewed recent RPM readings with patient. Patient agreeable to RPM graduation at this time. ACM to route chart to RPM pool to assist with equipment . Patient denied any other questions or concerns at this time. ACM to follow up at a later time. Remote Patient Monitoring Graduation      Date/Time:  2023 12:33 PM  Patient Current Location: [place]  Patient has graduated from the Remote Patient Monitoring program on 2023. RPM goals have been met at this time. Patient has been provided instruction on process to return RPM equipment and RPM has been deactivated. Patient has ACM's contact information for any further questions, concerns, or needs. Plan   RPM completion   Graduation     Offered patient enrollment in the Remote Patient Monitoring (RPM) program for in-home monitoring: Yes, patient already enrolled - graduating today.       Lab Results       None                 Goals Addressed                   This Visit's Progress     Reduce Falls    On track     I will reduce my risk of falls by the following: Remove rugs or use non slip rugs  Use walking aids like cane or walker    Barriers: overwhelmed by complexity of regimen  Plan for overcoming my barriers: education and support  Confidence: 8/10  Anticipated Goal Completion Date: 23              Future Appointments   Date Time Provider [address] [place] [address]

## 2023-09-25 NOTE — CARE COORDINATION
RPM Kit Return    CCSS placed call to patient to arrange RPM kit  through [facility]. Reviewed with patient how to pack equipment in original packing. Verified patient's availability to schedule UPS  time. UPS  time requested. Anticipated  date range 2-5 business days.

## 2023-09-25 NOTE — PROGRESS NOTES
Remote Patient Order Discontinued    Received request from Tammie Chance RN  to discontinue order for remote patient monitoring of COPD and HTN and order completed.

## 2023-10-10 ENCOUNTER — HOSPITAL ENCOUNTER (OUTPATIENT)
Dept: CT IMAGING | Age: 83
Discharge: HOME | End: 2023-10-10
Attending: INTERNAL MEDICINE
Payer: MEDICARE

## 2023-10-10 DIAGNOSIS — C34.32 MALIGNANT NEOPLASM OF LOWER LOBE, LEFT BRONCHUS OR LUNG (HCC): ICD-10-CM

## 2023-10-10 PROCEDURE — 71250 CT THORAX DX C-: CPT

## 2023-10-13 DIAGNOSIS — I10 ESSENTIAL HYPERTENSION: ICD-10-CM

## 2023-10-16 RX ORDER — LOSARTAN POTASSIUM AND HYDROCHLOROTHIAZIDE 12.5; 1 MG/1; MG/1
TABLET ORAL
Qty: 90 TABLET | Refills: 1 | Status: SHIPPED | OUTPATIENT
Start: 2023-10-16

## 2023-10-16 NOTE — TELEPHONE ENCOUNTER
Ethelene Innocent is requesting refill(s) losartan/hctz  Last OV 7/11/23 (pertaining to medication)  LR 2/27/23 (per medication requested)  Next office visit scheduled or attempted Yes   If no, reason:  10/18/23

## 2023-10-17 NOTE — TELEPHONE ENCOUNTER
Gilmar Gurrola is requesting refill(s) metformin  Last OV 7/11/23 (pertaining to medication)  LR 2/27/23 (per medication requested)  Next office visit scheduled or attempted Yes   If no, reason:  10/18/23

## 2023-10-18 ENCOUNTER — OFFICE VISIT (OUTPATIENT)
Dept: FAMILY MEDICINE CLINIC | Age: 83
End: 2023-10-18

## 2023-10-18 VITALS
SYSTOLIC BLOOD PRESSURE: 144 MMHG | OXYGEN SATURATION: 98 % | DIASTOLIC BLOOD PRESSURE: 70 MMHG | BODY MASS INDEX: 33.26 KG/M2 | HEART RATE: 68 BPM | HEIGHT: 59 IN | WEIGHT: 165 LBS

## 2023-10-18 DIAGNOSIS — E78.5 DYSLIPIDEMIA WITH HIGH LDL AND LOW HDL: ICD-10-CM

## 2023-10-18 DIAGNOSIS — C34.90 NON-SMALL CELL LUNG CANCER, UNSPECIFIED LATERALITY (HCC): ICD-10-CM

## 2023-10-18 DIAGNOSIS — I25.84 CORONARY ARTERY DISEASE DUE TO CALCIFIED CORONARY LESION: ICD-10-CM

## 2023-10-18 DIAGNOSIS — I10 ESSENTIAL HYPERTENSION: ICD-10-CM

## 2023-10-18 DIAGNOSIS — E11.69 TYPE 2 DIABETES MELLITUS WITH OTHER SPECIFIED COMPLICATION, WITHOUT LONG-TERM CURRENT USE OF INSULIN (HCC): Primary | ICD-10-CM

## 2023-10-18 DIAGNOSIS — F32.89 OTHER DEPRESSION: ICD-10-CM

## 2023-10-18 DIAGNOSIS — I25.10 CORONARY ARTERY DISEASE DUE TO CALCIFIED CORONARY LESION: ICD-10-CM

## 2023-10-18 DIAGNOSIS — Z23 NEED FOR INFLUENZA VACCINATION: ICD-10-CM

## 2023-10-18 DIAGNOSIS — F11.20 OPIOID DEPENDENCE WITH CURRENT USE (HCC): ICD-10-CM

## 2023-10-18 PROBLEM — J44.9 CHRONIC OBSTRUCTIVE PULMONARY DISEASE, UNSPECIFIED COPD TYPE (HCC): Status: RESOLVED | Noted: 2022-01-18 | Resolved: 2023-10-18

## 2023-10-18 LAB — HBA1C MFR BLD: 7.2 %

## 2023-10-18 NOTE — PROGRESS NOTES
Vaccine Information Sheet, \"Influenza - Inactivated\"  given to Vinnie Miranda, or parent/legal guardian of  Vinnie Miranda and verbalized understanding. Patient responses:    Have you ever had a reaction to a flu vaccine? No  Do you have any current illness? No  Have you ever had Guillian Sandy Syndrome? No  Do you have a serious allergy to any of the follow: Neomycin, Polymyxin, Thimerosal, eggs or egg products? No    Flu vaccine given per order. Please see immunization tab. Risks and benefits explained. Current VIS given.
Value Date    LDLCALC 85 07/11/2023    LDLDIRECT 123 (H) 01/03/2018       Patient's main complaint is insomnia. She says she will go nights without sleeping at all. She has been taking melatonin 10 to 20 mg at times. We talked about not taking 20 mg not to exceed 10 mg. We will give her printed information on insomnia assistance. Patient's last A1c was 6.8. She has been taking metformin at 1000 mg twice a day and Farxiga 10 mg a day. See today increased to 7.2. Patient says she has been eating more hollowing candy. Patient has CAD. She is on 81 mg aspirin and Lipitor 40 mg she will continue to follow-up with cardiology. She sees pain management for hydrocodone prescriptions for her chronic back pain. Her blood pressure has been controlled on losartan/HCTZ 100/12.5 continue this regimen. Her cholesterol has been well controlled on Lipitor 40 mg. Patient was asking for a different pulmonology referral.  She states that Dr. Delfino Sosa canceled 3 times on her and that made her angry. He was given her flu shot today. She was advised to get her RSV and COVID shot at the pharmacy patient's depression has been controlled with Lexapro 20 mg she will continue this    Current Outpatient Medications   Medication Sig Dispense Refill    metFORMIN (GLUCOPHAGE) 1000 MG tablet TAKE ONE TABLET BY MOUTH TWICE A DAY WITH MEALS 180 tablet 1    losartan-hydroCHLOROthiazide (HYZAAR) 100-12.5 MG per tablet TAKE ONE TABLET BY MOUTH DAILY 90 tablet 1    escitalopram (LEXAPRO) 20 MG tablet TAKE ONE TABLET BY MOUTH DAILY 90 tablet 1    HYDROcodone-acetaminophen (NORCO) 5-325 MG per tablet Take 1 tablet by mouth as needed for Pain for up to 30 days. Max one tablet a day. As needed for pain. 30 tablet 0    valACYclovir (VALTREX) 1 g tablet Take 1 tablet by mouth in the morning and 1 tablet in the evening.  20 tablet 0    FARXIGA 10 MG tablet TAKE ONE TABLET BY MOUTH EVERY MORNING 90 tablet 1    atorvastatin (LIPITOR) 40 MG tablet Take

## 2023-10-20 ENCOUNTER — CARE COORDINATION (OUTPATIENT)
Dept: CARE COORDINATION | Age: 83
End: 2023-10-20

## 2023-10-26 RX ORDER — HYDROCODONE BITARTRATE AND ACETAMINOPHEN 5; 325 MG/1; MG/1
1 TABLET ORAL PRN
Qty: 30 TABLET | Refills: 0 | Status: CANCELLED | OUTPATIENT
Start: 2023-10-26 | End: 2023-11-25

## 2023-10-30 ENCOUNTER — OFFICE VISIT (OUTPATIENT)
Dept: ORTHOPEDIC SURGERY | Age: 83
End: 2023-10-30
Payer: MEDICARE

## 2023-10-30 VITALS — HEIGHT: 59 IN | WEIGHT: 165 LBS | BODY MASS INDEX: 33.26 KG/M2

## 2023-10-30 DIAGNOSIS — M51.36 DDD (DEGENERATIVE DISC DISEASE), LUMBAR: ICD-10-CM

## 2023-10-30 DIAGNOSIS — G89.4 CHRONIC PAIN SYNDROME: ICD-10-CM

## 2023-10-30 DIAGNOSIS — M54.16 LUMBAR RADICULITIS: ICD-10-CM

## 2023-10-30 PROCEDURE — G8417 CALC BMI ABV UP PARAM F/U: HCPCS | Performed by: PHYSICIAN ASSISTANT

## 2023-10-30 PROCEDURE — G8484 FLU IMMUNIZE NO ADMIN: HCPCS | Performed by: PHYSICIAN ASSISTANT

## 2023-10-30 PROCEDURE — 1090F PRES/ABSN URINE INCON ASSESS: CPT | Performed by: PHYSICIAN ASSISTANT

## 2023-10-30 PROCEDURE — 1123F ACP DISCUSS/DSCN MKR DOCD: CPT | Performed by: PHYSICIAN ASSISTANT

## 2023-10-30 PROCEDURE — 1036F TOBACCO NON-USER: CPT | Performed by: PHYSICIAN ASSISTANT

## 2023-10-30 PROCEDURE — G8400 PT W/DXA NO RESULTS DOC: HCPCS | Performed by: PHYSICIAN ASSISTANT

## 2023-10-30 PROCEDURE — 99213 OFFICE O/P EST LOW 20 MIN: CPT | Performed by: PHYSICIAN ASSISTANT

## 2023-10-30 PROCEDURE — G8427 DOCREV CUR MEDS BY ELIG CLIN: HCPCS | Performed by: PHYSICIAN ASSISTANT

## 2023-10-30 RX ORDER — HYDROCODONE BITARTRATE AND ACETAMINOPHEN 5; 325 MG/1; MG/1
1 TABLET ORAL PRN
Qty: 30 TABLET | Refills: 0 | Status: SHIPPED | OUTPATIENT
Start: 2023-11-08 | End: 2023-12-08

## 2023-10-30 RX ORDER — HYDROCODONE BITARTRATE AND ACETAMINOPHEN 5; 325 MG/1; MG/1
1 TABLET ORAL PRN
Qty: 30 TABLET | Refills: 0 | Status: SHIPPED | OUTPATIENT
Start: 2023-12-06 | End: 2024-01-05

## 2023-10-30 RX ORDER — HYDROCODONE BITARTRATE AND ACETAMINOPHEN 5; 325 MG/1; MG/1
1 TABLET ORAL PRN
Qty: 30 TABLET | Refills: 0 | Status: SHIPPED | OUTPATIENT
Start: 2024-01-04 | End: 2024-02-03

## 2023-10-30 NOTE — PROGRESS NOTES
handling machinery when starting or adjusting the dose of medicines, feeling groggy or drowsy, or if having any cognitive issues related to the current medications. The patient is fully aware of the risk of potential addiction, overdose and death, if medicines are misused and not taken as prescribed. Planned duration of treatment until patient is able to be functional with less pain. Patient will follow up every 3 months or sooner, if needed. Discussed patient's responsibility to safely store and appropriately dispose up medication. Prescription for Narcan offered. If the patient develops new symptoms or if the symptoms worsen, the patient was told to call the office.       Sarah Reese PA-C, MPAS  Board Certified by the [facility]

## 2023-10-31 ENCOUNTER — CARE COORDINATION (OUTPATIENT)
Dept: CARE COORDINATION | Age: 83
End: 2023-10-31

## 2023-10-31 NOTE — CARE COORDINATION
Ambulatory Care Coordination Note  10/31/2023    Patient Current Location:  Home:  [facility] [address]     ACM contacted the patient by telephone. Verified name and  with patient as identifiers. Provided introduction to self, and explanation of the ACM role. Challenges to be reviewed by the provider   Additional needs identified to be addressed with provider: No  none               Method of communication with provider: chart routing. ACM: Sera Ortiz RN    ACM made final care coordination outreach to patient. Patient stated that she is doing \"great. \" Patient denied any chest pain, shortness of breath or swelling. Patient denied any other questions or concerns at this time. Patient given ACM contact information and informed that she can call for any future care coordination needs or if health status changes.          Offered patient enrollment in the Remote Patient Monitoring (RPM) program for in-home monitoring:  patient completed RPM .    Lab Results       None            Care Coordination Interventions    Referral from Primary Care Provider: No  Suggested Interventions and Community Resources  Fall Risk Prevention: Completed (Comment: mailed )  Transportation Support: Declined  Zone Management Tools: Completed (Comment: mailked )          Goals Addressed                   This Visit's Progress     COMPLETED: Reduce Falls         I will reduce my risk of falls by the following: Remove rugs or use non slip rugs  Use walking aids like cane or walker    Barriers: overwhelmed by complexity of regimen  Plan for overcoming my barriers: education and support  Confidence: 8/10  Anticipated Goal Completion Date: 23              Future Appointments   Date Time Provider [address]   2024 11:00 AM MD VELVET Bryant [facility] [place] [facility]   2024  1:45 PM Ministerio Concepcion PA-C AND [facility]   ,   General Assessment         , and No linked episodes

## 2023-12-22 NOTE — TELEPHONE ENCOUNTER
Patient had a total shoulder replacement six days ago. She had a pain block in her neck, and carried the pain ball to control pain. The area in which the pain block was placed is infected and puss was draining out. I advised she needed to call the surgeon's office. She said she spoke to a PA at that office and was advised the surgeon is out of the office until Wednesday. She was told to call her PCP. Is This A New Presentation, Or A Follow-Up?: Wart How Severe Are Your Warts?: mild Additional History: Established patient last seen by Dr. Latif \\n\\npatient presents for wart on foot he noticed after swimming and would like evaluated.

## 2024-01-18 LAB — DIABETIC RETINOPATHY: NEGATIVE

## 2024-01-23 ENCOUNTER — OFFICE VISIT (OUTPATIENT)
Dept: FAMILY MEDICINE CLINIC | Age: 84
End: 2024-01-23
Payer: MEDICARE

## 2024-01-23 VITALS
WEIGHT: 167.4 LBS | OXYGEN SATURATION: 97 % | HEART RATE: 65 BPM | HEIGHT: 59 IN | BODY MASS INDEX: 33.75 KG/M2 | SYSTOLIC BLOOD PRESSURE: 126 MMHG | DIASTOLIC BLOOD PRESSURE: 60 MMHG

## 2024-01-23 DIAGNOSIS — C34.90 NON-SMALL CELL LUNG CANCER, UNSPECIFIED LATERALITY (HCC): ICD-10-CM

## 2024-01-23 DIAGNOSIS — E78.5 DYSLIPIDEMIA WITH HIGH LDL AND LOW HDL: ICD-10-CM

## 2024-01-23 DIAGNOSIS — F11.20 OPIOID DEPENDENCE WITH CURRENT USE (HCC): ICD-10-CM

## 2024-01-23 DIAGNOSIS — I25.10 CORONARY ARTERY DISEASE DUE TO CALCIFIED CORONARY LESION: ICD-10-CM

## 2024-01-23 DIAGNOSIS — E11.69 TYPE 2 DIABETES MELLITUS WITH OTHER SPECIFIED COMPLICATION, WITHOUT LONG-TERM CURRENT USE OF INSULIN (HCC): Primary | ICD-10-CM

## 2024-01-23 DIAGNOSIS — I25.84 CORONARY ARTERY DISEASE DUE TO CALCIFIED CORONARY LESION: ICD-10-CM

## 2024-01-23 DIAGNOSIS — F32.89 OTHER DEPRESSION: ICD-10-CM

## 2024-01-23 DIAGNOSIS — I10 ESSENTIAL HYPERTENSION: ICD-10-CM

## 2024-01-23 LAB
ALBUMIN SERPL-MCNC: 4.5 G/DL (ref 3.4–5)
ALBUMIN/GLOB SERPL: 1.6 {RATIO} (ref 1.1–2.2)
ALP SERPL-CCNC: 61 U/L (ref 40–129)
ALT SERPL-CCNC: 15 U/L (ref 10–40)
ANION GAP SERPL CALCULATED.3IONS-SCNC: 14 MMOL/L (ref 3–16)
AST SERPL-CCNC: 13 U/L (ref 15–37)
BILIRUB SERPL-MCNC: 0.3 MG/DL (ref 0–1)
BUN SERPL-MCNC: 16 MG/DL (ref 7–20)
CALCIUM SERPL-MCNC: 9.5 MG/DL (ref 8.3–10.6)
CHLORIDE SERPL-SCNC: 99 MMOL/L (ref 99–110)
CHOLEST SERPL-MCNC: 135 MG/DL (ref 0–199)
CO2 SERPL-SCNC: 26 MMOL/L (ref 21–32)
CREAT SERPL-MCNC: <0.5 MG/DL (ref 0.6–1.2)
GFR SERPLBLD CREATININE-BSD FMLA CKD-EPI: >60 ML/MIN/{1.73_M2}
GLUCOSE SERPL-MCNC: 112 MG/DL (ref 70–99)
HBA1C MFR BLD: 7.7 %
HDLC SERPL-MCNC: 34 MG/DL (ref 40–60)
LDLC SERPL CALC-MCNC: 59 MG/DL
POTASSIUM SERPL-SCNC: 4.3 MMOL/L (ref 3.5–5.1)
PROT SERPL-MCNC: 7.4 G/DL (ref 6.4–8.2)
SODIUM SERPL-SCNC: 139 MMOL/L (ref 136–145)
TRIGL SERPL-MCNC: 211 MG/DL (ref 0–150)
VLDLC SERPL CALC-MCNC: 42 MG/DL

## 2024-01-23 PROCEDURE — 1090F PRES/ABSN URINE INCON ASSESS: CPT | Performed by: FAMILY MEDICINE

## 2024-01-23 PROCEDURE — 99214 OFFICE O/P EST MOD 30 MIN: CPT | Performed by: FAMILY MEDICINE

## 2024-01-23 PROCEDURE — 36415 COLL VENOUS BLD VENIPUNCTURE: CPT | Performed by: FAMILY MEDICINE

## 2024-01-23 PROCEDURE — 3051F HG A1C>EQUAL 7.0%<8.0%: CPT | Performed by: FAMILY MEDICINE

## 2024-01-23 PROCEDURE — G8484 FLU IMMUNIZE NO ADMIN: HCPCS | Performed by: FAMILY MEDICINE

## 2024-01-23 PROCEDURE — 3074F SYST BP LT 130 MM HG: CPT | Performed by: FAMILY MEDICINE

## 2024-01-23 PROCEDURE — 83036 HEMOGLOBIN GLYCOSYLATED A1C: CPT | Performed by: FAMILY MEDICINE

## 2024-01-23 PROCEDURE — 3078F DIAST BP <80 MM HG: CPT | Performed by: FAMILY MEDICINE

## 2024-01-23 PROCEDURE — 1123F ACP DISCUSS/DSCN MKR DOCD: CPT | Performed by: FAMILY MEDICINE

## 2024-01-23 PROCEDURE — G8400 PT W/DXA NO RESULTS DOC: HCPCS | Performed by: FAMILY MEDICINE

## 2024-01-23 PROCEDURE — G8427 DOCREV CUR MEDS BY ELIG CLIN: HCPCS | Performed by: FAMILY MEDICINE

## 2024-01-23 PROCEDURE — 1036F TOBACCO NON-USER: CPT | Performed by: FAMILY MEDICINE

## 2024-01-23 PROCEDURE — G8417 CALC BMI ABV UP PARAM F/U: HCPCS | Performed by: FAMILY MEDICINE

## 2024-01-23 RX ORDER — VALACYCLOVIR HYDROCHLORIDE 1 G/1
1000 TABLET, FILM COATED ORAL EVERY 12 HOURS
Qty: 20 TABLET | Refills: 0 | Status: SHIPPED | OUTPATIENT
Start: 2024-01-23

## 2024-01-23 ASSESSMENT — PATIENT HEALTH QUESTIONNAIRE - PHQ9
9. THOUGHTS THAT YOU WOULD BE BETTER OFF DEAD, OR OF HURTING YOURSELF: 0
SUM OF ALL RESPONSES TO PHQ QUESTIONS 1-9: 0
SUM OF ALL RESPONSES TO PHQ QUESTIONS 1-9: 0
3. TROUBLE FALLING OR STAYING ASLEEP: 0
1. LITTLE INTEREST OR PLEASURE IN DOING THINGS: 0
5. POOR APPETITE OR OVEREATING: 0
8. MOVING OR SPEAKING SO SLOWLY THAT OTHER PEOPLE COULD HAVE NOTICED. OR THE OPPOSITE, BEING SO FIGETY OR RESTLESS THAT YOU HAVE BEEN MOVING AROUND A LOT MORE THAN USUAL: 0
SUM OF ALL RESPONSES TO PHQ QUESTIONS 1-9: 0
6. FEELING BAD ABOUT YOURSELF - OR THAT YOU ARE A FAILURE OR HAVE LET YOURSELF OR YOUR FAMILY DOWN: 0
2. FEELING DOWN, DEPRESSED OR HOPELESS: 0
SUM OF ALL RESPONSES TO PHQ9 QUESTIONS 1 & 2: 0
7. TROUBLE CONCENTRATING ON THINGS, SUCH AS READING THE NEWSPAPER OR WATCHING TELEVISION: 0
10. IF YOU CHECKED OFF ANY PROBLEMS, HOW DIFFICULT HAVE THESE PROBLEMS MADE IT FOR YOU TO DO YOUR WORK, TAKE CARE OF THINGS AT HOME, OR GET ALONG WITH OTHER PEOPLE: 0
SUM OF ALL RESPONSES TO PHQ QUESTIONS 1-9: 0
4. FEELING TIRED OR HAVING LITTLE ENERGY: 0

## 2024-01-23 NOTE — PROGRESS NOTES
Marianela Quintana presents for   Chief Complaint   Patient presents with    Diabetes     Pt is here for a 3 month followup, she is fasting for blood work    Hypertension    Hyperlipidemia        ASSESSMENT:   Diagnosis Orders   1. Type 2 diabetes mellitus with other specified complication, without long-term current use of insulin (HCC), A1c increased to 7.7.  For now continue metformin at 1000 mg twice a day and Farxiga 10 mg daily.  Continue lifestyle efforts POCT glycosylated hemoglobin (Hb A1C)    dapagliflozin (FARXIGA) 10 MG tablet      2. Non-small cell lung cancer, unspecified laterality (HCC), continue oncology follow-up every 6 months       3. Opioid dependence with current use (HCC), plan per pain management       4. Essential hypertension, blood pressure well-controlled here today continue losartan/HCTZ 100/12.5 Comprehensive Metabolic Panel      5. Other depression, controlled continue Lexapro 20 mg       6. Coronary artery disease due to calcified coronary lesion, controlled continue 81 mg aspirin, statin and cardiology follow-up       7. Dyslipidemia with high LDL and low HDL, labs are pending will say Lipitor 40 mg is working Lipid Panel           Plan:  1)  Medication: continue current medication regimen unchanged, medications are working and tolerated, continue as listed  2)  Recheck in 3 months, sooner should new symptoms or problems arise.  3) LLR    Patient plans on getting the RSV vaccine      SUBJECTIVE:  Marianela Quintana is a 83 y.o. female who presents for evaluation of diabetes, depression, lung cancer, CAD, hypertension and hyperlipidemia. She indicates that she is feeling well and denies any symptoms referable to her elevated blood pressure.   Specifically denies chest pain, palpitations,  orthopnea, PND or peripheral edema or neuro sx.  No anorexia, arthralgia, or leg cramps noted. Current medication regimen is as listed below. She denies any side effects of medication, and has been taking it

## 2024-01-29 ENCOUNTER — OFFICE VISIT (OUTPATIENT)
Dept: ORTHOPEDIC SURGERY | Age: 84
End: 2024-01-29
Payer: MEDICARE

## 2024-01-29 VITALS — BODY MASS INDEX: 33.67 KG/M2 | WEIGHT: 167 LBS | HEIGHT: 59 IN

## 2024-01-29 DIAGNOSIS — G89.4 CHRONIC PAIN SYNDROME: ICD-10-CM

## 2024-01-29 DIAGNOSIS — M51.36 DDD (DEGENERATIVE DISC DISEASE), LUMBAR: ICD-10-CM

## 2024-01-29 DIAGNOSIS — M54.16 LUMBAR RADICULITIS: ICD-10-CM

## 2024-01-29 PROCEDURE — 1123F ACP DISCUSS/DSCN MKR DOCD: CPT | Performed by: PHYSICIAN ASSISTANT

## 2024-01-29 PROCEDURE — 99213 OFFICE O/P EST LOW 20 MIN: CPT | Performed by: PHYSICIAN ASSISTANT

## 2024-01-29 PROCEDURE — G8417 CALC BMI ABV UP PARAM F/U: HCPCS | Performed by: PHYSICIAN ASSISTANT

## 2024-01-29 PROCEDURE — G8484 FLU IMMUNIZE NO ADMIN: HCPCS | Performed by: PHYSICIAN ASSISTANT

## 2024-01-29 PROCEDURE — 1036F TOBACCO NON-USER: CPT | Performed by: PHYSICIAN ASSISTANT

## 2024-01-29 PROCEDURE — G8427 DOCREV CUR MEDS BY ELIG CLIN: HCPCS | Performed by: PHYSICIAN ASSISTANT

## 2024-01-29 PROCEDURE — G8400 PT W/DXA NO RESULTS DOC: HCPCS | Performed by: PHYSICIAN ASSISTANT

## 2024-01-29 PROCEDURE — 1090F PRES/ABSN URINE INCON ASSESS: CPT | Performed by: PHYSICIAN ASSISTANT

## 2024-01-29 RX ORDER — HYDROCODONE BITARTRATE AND ACETAMINOPHEN 5; 325 MG/1; MG/1
1 TABLET ORAL PRN
Qty: 30 TABLET | Refills: 0 | Status: SHIPPED | OUTPATIENT
Start: 2024-03-08 | End: 2024-04-07

## 2024-01-29 RX ORDER — HYDROCODONE BITARTRATE AND ACETAMINOPHEN 5; 325 MG/1; MG/1
1 TABLET ORAL PRN
Qty: 30 TABLET | Refills: 0 | Status: SHIPPED | OUTPATIENT
Start: 2024-04-06 | End: 2024-05-06

## 2024-01-29 RX ORDER — HYDROCODONE BITARTRATE AND ACETAMINOPHEN 5; 325 MG/1; MG/1
1 TABLET ORAL PRN
Qty: 30 TABLET | Refills: 0 | Status: SHIPPED | OUTPATIENT
Start: 2024-02-10 | End: 2024-03-11

## 2024-01-29 NOTE — PROGRESS NOTES
SPINE: Follow Up     CHIEF COMPLAINT: Chronic low back pain, follow-up    HISTORY OF PRESENT ILLNESS:                The patient is a 83 y.o. female well known to me, here for chronic back pain. She reports chronic aching low back pain.  Periodically pain will radiate into the right lateral thigh/calf.  Her low back pain is still most bothersome.  Her pain is increased with prolonged activity, bending, lifting.  She still reports relief with Norco 5mg qd PRN, rest and heat.  She is s/p left lower lobectomy with Dr. Cox for lung cancer.  Her oncologist is Dr. Bolton. Her low back pain is well managed with Norco 5/325 I po qd PRN without side effects. Medication does allow her to be more functional--able to perform ADLs independently, travel.  She recently went to [place] soon to visit her sister.  She reports increased pain with household activities but her medication allows her to continue ADLs independently.  She is typically able to sleep >6hr/night. She is able to care for her 9 cats.  She denies any progressive numbness tingling or LE weakness.  No recent fevers chills or infections.  Pain overall chronic and stable.     Current/Past Treatment:   Physical Therapy: YES  Chiropractic: no  Injection: Multiple prior injections & procedures: ESIs, RFN: Dr. Pantoja, R IA hip inj--Dr. Park  Medications: Norco 5mg I po qd PRN, prior oral steroids, NSAIDs, post-op percocet (Dr. Trejo)    Surgery/Consult: h/o lumbar decompression, Dr. Colvin      Function-Does the pain medication improve your ability to do:   Personal care: Yes  Housework: Yes   Physical activity: Yes  Social activity: Yes     Pain Scale: 1-10  With Meds:  0-1/10  Pain Scale: 1-10 Without Meds: 7-9/10     Potential aberrant drug-related behavior:  Aberrant behavior identified? NO  Potential aberrant behavior identified? NO  Reports loss are stolen prescriptions? NO  Insist on certain medications by name? NO  Purposeful oversedation? NO  Increased

## 2024-02-28 DIAGNOSIS — F32.89 OTHER DEPRESSION: ICD-10-CM

## 2024-02-28 RX ORDER — ESCITALOPRAM OXALATE 20 MG/1
TABLET ORAL
Qty: 90 TABLET | Refills: 1 | Status: SHIPPED | OUTPATIENT
Start: 2024-02-28

## 2024-02-28 NOTE — TELEPHONE ENCOUNTER
Marianela Quintana is requesting refill(s) lexapro  Last OV 1/23/24 (pertaining to medication)  LR 8/22/23 (per medication requested)  Next office visit scheduled or attempted Yes   If no, reason:  4/30/24

## 2024-03-27 ENCOUNTER — TELEPHONE (OUTPATIENT)
Dept: ORTHOPEDIC SURGERY | Age: 84
End: 2024-03-27

## 2024-03-27 DIAGNOSIS — M51.36 DDD (DEGENERATIVE DISC DISEASE), LUMBAR: ICD-10-CM

## 2024-03-27 DIAGNOSIS — M54.16 LUMBAR RADICULITIS: ICD-10-CM

## 2024-03-27 DIAGNOSIS — G89.4 CHRONIC PAIN SYNDROME: Primary | ICD-10-CM

## 2024-03-27 RX ORDER — HYDROCODONE BITARTRATE AND ACETAMINOPHEN 5; 325 MG/1; MG/1
1 TABLET ORAL DAILY PRN
Qty: 30 TABLET | Refills: 0 | Status: CANCELLED | OUTPATIENT
Start: 2024-03-27 | End: 2024-04-26

## 2024-03-27 NOTE — TELEPHONE ENCOUNTER
S/W the Pharmacist at [facility] to discuss mutual patient refills on her medication. I asked if verbal consent is OK to use for the refill today and the pharmacist stated that was fine, patient has a f/u appointment pending for April we can send updated refills at that time. Pharmacist voiced understanding. The medication will be filled today for the patient. I voiced understanding.

## 2024-03-27 NOTE — TELEPHONE ENCOUNTER
General Question     Subject: QUESTIONS OF RX  Patient and /or Facility Request: KAYDEN CHANDLER  Contact Number: [phone]    LAY WITH [facility] REQUESTS CLINICAL TO FORWARD OVER AN UPDATED SCRIPT FOR VIDICOIN AS REQUESTED FROM PATIENT TO REFILL.     LAY STATED THAT THE ORIGINAL RX ISN'T TO BE FILLED UNTIL LATER IN APR.     PLEASE ADVISE

## 2024-03-27 NOTE — TELEPHONE ENCOUNTER
S/W Payton at the [facility]. She informed me that the patient last refilled her medication on 24. Patient's script from 3/8/24 has , next refill for the patient's medication was 24. Patient is requesting new script to be sent over to her pharmacy.

## 2024-04-17 ENCOUNTER — HOSPITAL ENCOUNTER (OUTPATIENT)
Dept: CT IMAGING | Age: 84
Discharge: HOME | End: 2024-04-17
Payer: MEDICARE

## 2024-04-17 DIAGNOSIS — C34.32 MALIGNANT NEOPLASM OF LOWER LOBE, LEFT BRONCHUS OR LUNG (HCC): ICD-10-CM

## 2024-04-17 PROCEDURE — 71250 CT THORAX DX C-: CPT

## 2024-04-18 DIAGNOSIS — I10 ESSENTIAL HYPERTENSION: ICD-10-CM

## 2024-04-18 NOTE — PROGRESS NOTES
PULMONARY CLINIC NOTE      Marianela Quintana   : 1940  MRN: [id number]     Date of Service: 2024    PCP: Kailee Fields MD    Referring provider: No ref. provider found      Chief Complaint   Patient presents with    Established New Doctor     Patient has been previously seen by Dr. Cox for lung nodules           ASSESSMENT & PLAN       84 y.o. pleasant  female patient with:    No diagnosis found.       # Left lower lobe NSCLC  S/P L VATS with left lower lobectomy on 2023 by Dr. Marrero  No evidence of cancer recurrence on surveillance imaging.  # Persistent chest pain at the site of VATS surgical ports  # Mild COPD.  Mild centrilobular emphysema on imaging.  Mild obstruction on presurgery PFTs with air trapping and borderline BD response  # Nicotine dependence history. 75 PY. Quit date:   No need for inhalers currently.  Will continue to monitor closely for symptoms  Recommended Tylenol as well as gabapentin as needed for pain at the site of chest surgery.  Patient has gabapentin at home from time of surgery.  Continue cancer surveillance as per medical oncology every 6 months  Follow-up in 12 months      # Insomnia with hypersomnolence  Negative previously study.  On melatonin at bedtime  Sleep hygiene education will be provided for insomnia.    # Metabolic syndrome: HTN, HLD, DM, body habitus, hepatic steatosis/hepatomegaly  Body mass index is 34.46 kg/m².  Targeting healthy weight is encouraged. Healthy weight can help treat sleep apnea, decrease breathing difficulties and respiratory illness exacerbations.      # Chronic sinusitis    # Vaccines: Reviewed      Health Maintenance/Preventive measures:        >>  Avoid exposure to tobacco, irritants, allergens as possible as well as contact with patients with infectious respiratory illness.        >>  Stay up-to-date with influenza, pneumonia, RSV, & COVID-19 vaccines as recommended by the Advisory Committee on Immunization Practices

## 2024-04-19 ENCOUNTER — OFFICE VISIT (OUTPATIENT)
Dept: CARDIOLOGY CLINIC | Age: 84
End: 2024-04-19
Payer: MEDICARE

## 2024-04-19 VITALS
HEART RATE: 79 BPM | WEIGHT: 160 LBS | SYSTOLIC BLOOD PRESSURE: 148 MMHG | OXYGEN SATURATION: 93 % | DIASTOLIC BLOOD PRESSURE: 68 MMHG | BODY MASS INDEX: 32.25 KG/M2 | HEIGHT: 59 IN

## 2024-04-19 DIAGNOSIS — I10 ESSENTIAL HYPERTENSION: Chronic | ICD-10-CM

## 2024-04-19 DIAGNOSIS — I35.0 NONRHEUMATIC AORTIC VALVE STENOSIS: ICD-10-CM

## 2024-04-19 DIAGNOSIS — E78.2 MIXED HYPERLIPIDEMIA: ICD-10-CM

## 2024-04-19 DIAGNOSIS — I25.10 CORONARY ARTERY CALCIFICATION SEEN ON CAT SCAN: Primary | ICD-10-CM

## 2024-04-19 PROCEDURE — G8400 PT W/DXA NO RESULTS DOC: HCPCS | Performed by: INTERNAL MEDICINE

## 2024-04-19 PROCEDURE — 3078F DIAST BP <80 MM HG: CPT | Performed by: INTERNAL MEDICINE

## 2024-04-19 PROCEDURE — G8417 CALC BMI ABV UP PARAM F/U: HCPCS | Performed by: INTERNAL MEDICINE

## 2024-04-19 PROCEDURE — 1090F PRES/ABSN URINE INCON ASSESS: CPT | Performed by: INTERNAL MEDICINE

## 2024-04-19 PROCEDURE — 1036F TOBACCO NON-USER: CPT | Performed by: INTERNAL MEDICINE

## 2024-04-19 PROCEDURE — G8427 DOCREV CUR MEDS BY ELIG CLIN: HCPCS | Performed by: INTERNAL MEDICINE

## 2024-04-19 PROCEDURE — 99214 OFFICE O/P EST MOD 30 MIN: CPT | Performed by: INTERNAL MEDICINE

## 2024-04-19 PROCEDURE — 1123F ACP DISCUSS/DSCN MKR DOCD: CPT | Performed by: INTERNAL MEDICINE

## 2024-04-19 PROCEDURE — 3077F SYST BP >= 140 MM HG: CPT | Performed by: INTERNAL MEDICINE

## 2024-04-19 RX ORDER — FENOFIBRATE 54 MG/1
54 TABLET ORAL DAILY
Qty: 90 TABLET | Refills: 3 | Status: SHIPPED | OUTPATIENT
Start: 2024-04-19

## 2024-04-19 RX ORDER — LOSARTAN POTASSIUM AND HYDROCHLOROTHIAZIDE 12.5; 1 MG/1; MG/1
TABLET ORAL
Qty: 90 TABLET | Refills: 1 | Status: SHIPPED | OUTPATIENT
Start: 2024-04-19

## 2024-04-19 NOTE — PROGRESS NOTES
CARDIOLOGY FOLLOW-UP      Patient Name:  Marianela Quintana  Requesting Physician: No admitting provider for patient encounter.  Primary Care Physician: Kailee Fields MD      Reason for Consultation/Chief Complaint: Coronary artery calcification, aortic stenosis  History of Present Illness:  Marianela Quintana is a 84 y.o. female with a medical history notable for mild aortic valve stenosis, mild AR, hypertension, coronary artery calcification per CT, Type 2 Diabetes mellitus, non-small cell lung cancer status post left lower lobe lobectomy, obesity and COPD.     Preoperative stress test 3/18/22 revealing normal perfusion with no inducible ischemia/scar. 4/21/23 she underwent a Left Lower Lobe wedge resection and left lower lobectomy with mediastinal lymphadenectomy for non-small cell lung cancer.     Today she reports feeling well.  Blood pressure initially taken was high today.  She feels like this may be the case at home.  She does not endorse any symptoms of headache, vision changes or strokelike symptoms.  No new chest pain or shortness of breath.  She reports she did not have to have chemo or radiation after her cancer diagnosis.  Had a follow up CT yesterday that showed no evidence of recurrence/progression/metastasis.  Mild pulmonary emphysema.  She recently lost 13 lbs by watching watch she eats she states. Her biggest complaint is fatigue.  Not as active as she could be.  Denies palpitations, dizziness, near-syncope or syncope. Denies paroxysmal nocturnal dyspnea, orthopnea, increasing lower extremity edema or weight gain.      The patient is compliant with medications.  Cost of medications is affordable.  No endorsed side effects.        Past Medical History:   has a past medical history of Anxiety, Arthritis, Crohn's disease (HCC), Fracture, foot, Fracture, wrist, [place] (hard of hearing), Hyperlipidemia, Hypertension, Macular degeneration, Macular degeneration, Murmur, Non-small cell lung cancer, unspecified

## 2024-04-19 NOTE — TELEPHONE ENCOUNTER
Marianela Quintana is requesting refill(s) metformin  Last OV 1/23/24 (pertaining to medication)  LR 10/17/23 (per medication requested)  Next office visit scheduled or attempted Yes   If no, reason:  4/30/24        Marianela Quintana is requesting refill(s) losartan/hctz  Last OV 1/23/24 (pertaining to medication)  LR 10/16/23 (per medication requested)  Next office visit scheduled or attempted Yes   If no, reason:  4/30/24

## 2024-04-19 NOTE — PATIENT INSTRUCTIONS
PLAN:  1.   Patient will be started on new medication Fenofibrate 54 mg daily (Tricor).  Patient verbalizes understanding of the need for treatment and education provided at today's visit.  Additional education materials will be provided in the AVS.  2.   She will continue aspirin, statin for management of coronary artery disease.  3.   Will mention to Dr. Fields regarding Losartan-HCTZ and possibly different BP medication management if your BP is consistently running high.   3.  Echocardiogram to view size and strength of the heart and valves in 20

## 2024-04-29 ENCOUNTER — OFFICE VISIT (OUTPATIENT)
Dept: ORTHOPEDIC SURGERY | Age: 84
End: 2024-04-29
Payer: MEDICARE

## 2024-04-29 VITALS — WEIGHT: 160 LBS | BODY MASS INDEX: 32.25 KG/M2 | HEIGHT: 59 IN

## 2024-04-29 DIAGNOSIS — M51.36 DDD (DEGENERATIVE DISC DISEASE), LUMBAR: ICD-10-CM

## 2024-04-29 DIAGNOSIS — G89.4 CHRONIC PAIN SYNDROME: ICD-10-CM

## 2024-04-29 DIAGNOSIS — M54.16 LUMBAR RADICULITIS: ICD-10-CM

## 2024-04-29 PROCEDURE — G8400 PT W/DXA NO RESULTS DOC: HCPCS | Performed by: PHYSICIAN ASSISTANT

## 2024-04-29 PROCEDURE — 1090F PRES/ABSN URINE INCON ASSESS: CPT | Performed by: PHYSICIAN ASSISTANT

## 2024-04-29 PROCEDURE — 99213 OFFICE O/P EST LOW 20 MIN: CPT | Performed by: PHYSICIAN ASSISTANT

## 2024-04-29 PROCEDURE — G8417 CALC BMI ABV UP PARAM F/U: HCPCS | Performed by: PHYSICIAN ASSISTANT

## 2024-04-29 PROCEDURE — G8427 DOCREV CUR MEDS BY ELIG CLIN: HCPCS | Performed by: PHYSICIAN ASSISTANT

## 2024-04-29 PROCEDURE — 1123F ACP DISCUSS/DSCN MKR DOCD: CPT | Performed by: PHYSICIAN ASSISTANT

## 2024-04-29 PROCEDURE — 1036F TOBACCO NON-USER: CPT | Performed by: PHYSICIAN ASSISTANT

## 2024-04-29 RX ORDER — HYDROCODONE BITARTRATE AND ACETAMINOPHEN 5; 325 MG/1; MG/1
1 TABLET ORAL PRN
Qty: 30 TABLET | Refills: 0 | Status: SHIPPED | OUTPATIENT
Start: 2024-04-29 | End: 2024-04-30 | Stop reason: SDUPTHER

## 2024-04-29 RX ORDER — HYDROCODONE BITARTRATE AND ACETAMINOPHEN 5; 325 MG/1; MG/1
1 TABLET ORAL PRN
Qty: 30 TABLET | Refills: 0 | Status: SHIPPED | OUTPATIENT
Start: 2024-05-27 | End: 2024-04-30 | Stop reason: SDUPTHER

## 2024-04-29 RX ORDER — HYDROCODONE BITARTRATE AND ACETAMINOPHEN 5; 325 MG/1; MG/1
1 TABLET ORAL PRN
Qty: 30 TABLET | Refills: 0 | Status: SHIPPED | OUTPATIENT
Start: 2024-06-25 | End: 2024-07-25

## 2024-04-29 NOTE — PROGRESS NOTES
April 2, 2019 +MOP as expected      UDS February 28, 2018 negative for all (last dose 3 days prior)     ORT 5-2018=1     UDS 4-28-17 + for OPI as expected      UDS 4-8-16: Negative for all as expected, last dose was last week (taking PRN)      Lumbar radiographs 3-27-15 2 views AP/LAT: L4-5 spondy. Moderate DDD L4-5 & L5-S1      MRI 11/28/2011:  IMPRESSION-         1. Stable L4 and L5 left pedicle edema of uncertain etiology.         2. L4-L5 facet arthropathy, particularly on the left where there    is a left L4 and L5 nerve root compression.              Impression:    1) Chronic LBP, intermittent right lumbar radiculitis--stable  2) H/o lumbar decompression, Dr. Colvin   3) H/o multiple interventional procedures, Dr. Pantoja  4) Chronic right hip pain, OA, Dr. Park  5) Opioid maintenance, low risk per ORT =1  6) S/p R TSR, Dr. Trejo   7) Lung CA status post lobectomy, Dr. Bolton      Plan:  1) Refill: doing well on chronic regimen as detailed above  Orders Placed This Encounter   Medications    HYDROcodone-acetaminophen (NORCO) 5-325 MG per tablet     Sig: Take 1 tablet by mouth as needed for Pain for up to 30 days. Max one tablet a day. As needed for pain.     Dispense:  30 tablet     Refill:  0     Reduce doses taken as pain becomes manageable    HYDROcodone-acetaminophen (NORCO) 5-325 MG per tablet     Sig: Take 1 tablet by mouth as needed for Pain for up to 30 days. Max one tablet a day. As needed for pain.     Dispense:  30 tablet     Refill:  0     Reduce doses taken as pain becomes manageable    HYDROcodone-acetaminophen (NORCO) 5-325 MG per tablet     Sig: Take 1 tablet by mouth as needed for Pain for up to 30 days. Max one tablet a day. As needed for pain.     Dispense:  30 tablet     Refill:  0     Reduce doses taken as pain becomes manageable     2) Cont HEP    3) Updated ORT/PC today    4) F/u 3mo,sooner if needed     Controlled Substance Monitoring:    Acute and Chronic Pain Monitoring:   RX

## 2024-04-30 ENCOUNTER — OFFICE VISIT (OUTPATIENT)
Dept: FAMILY MEDICINE CLINIC | Age: 84
End: 2024-04-30

## 2024-04-30 VITALS
DIASTOLIC BLOOD PRESSURE: 72 MMHG | WEIGHT: 170.6 LBS | BODY MASS INDEX: 34.39 KG/M2 | SYSTOLIC BLOOD PRESSURE: 136 MMHG | HEIGHT: 59 IN | OXYGEN SATURATION: 96 % | HEART RATE: 73 BPM

## 2024-04-30 DIAGNOSIS — I25.10 CORONARY ARTERY DISEASE DUE TO CALCIFIED CORONARY LESION: ICD-10-CM

## 2024-04-30 DIAGNOSIS — E11.69 TYPE 2 DIABETES MELLITUS WITH OTHER SPECIFIED COMPLICATION, WITHOUT LONG-TERM CURRENT USE OF INSULIN (HCC): Primary | ICD-10-CM

## 2024-04-30 DIAGNOSIS — I25.84 CORONARY ARTERY DISEASE DUE TO CALCIFIED CORONARY LESION: ICD-10-CM

## 2024-04-30 DIAGNOSIS — C34.90 NON-SMALL CELL LUNG CANCER, UNSPECIFIED LATERALITY (HCC): ICD-10-CM

## 2024-04-30 DIAGNOSIS — F32.1 CURRENT MODERATE EPISODE OF MAJOR DEPRESSIVE DISORDER, UNSPECIFIED WHETHER RECURRENT (HCC): ICD-10-CM

## 2024-04-30 DIAGNOSIS — F11.20 OPIOID DEPENDENCE WITH CURRENT USE (HCC): ICD-10-CM

## 2024-04-30 DIAGNOSIS — J44.9 CHRONIC OBSTRUCTIVE PULMONARY DISEASE, UNSPECIFIED COPD TYPE (HCC): ICD-10-CM

## 2024-04-30 DIAGNOSIS — E78.5 DYSLIPIDEMIA WITH HIGH LDL AND LOW HDL: ICD-10-CM

## 2024-04-30 DIAGNOSIS — I10 ESSENTIAL HYPERTENSION: ICD-10-CM

## 2024-04-30 LAB — HBA1C MFR BLD: 7.5 %

## 2024-04-30 NOTE — PROGRESS NOTES
Marianela Quintana presents for   Chief Complaint   Patient presents with    Diabetes     Pt states that she is here for a 3 month f/u, is not fasting for bw     Hypertension    Depression        ASSESSMENT:   Diagnosis Orders   1. Type 2 diabetes mellitus with other specified complication, without long-term current use of insulin (HCC), improved slightly A1c today 7.5, continue metformin at 1000 mg twice a day and Farxiga 10 mg a day POCT glycosylated hemoglobin (Hb A1C)      2. Essential hypertension, acceptable control, continue losartan/HCTZ 100/12.5 daily       3. Coronary artery disease due to calcified coronary lesion, asymptomatic, continue cardiology follow-up continue 81 mg aspirin and statin       4. Dyslipidemia with high LDL and low HDL, controlled continue Lipitor 40 milligrams, cardiology added Tricor       5. Chronic obstructive pulmonary disease, unspecified COPD type ([facility]), stable, patient has appointment with a new pulmonologist next month       6. Current moderate episode of major depressive disorder, unspecified whether recurrent ([facility]), controlled, continue Lexapro 20 mg       7. Opioid dependence with current use ([facility]), continue pain management follow-up       8. Non-small cell lung cancer, unspecified laterality ([facility]), stable, continue pulmonology follow-up            Plan:  1)  Medication: continue current medication regimen unchanged, medications are working and tolerated, continue as listed  2)  Recheck in 3 months, sooner should new symptoms or problems arise.  3) LLR          SUBJECTIVE:  Marianela Quintana is a 84 y.o. female who presents for evaluation of diabetes, non small cell lung cancer, COPD, CAD, hyperlipidemia, depression, opioid dependence, hypertension and hyperlipidemia. She indicates that she is feeling well and denies any symptoms referable to her elevated blood pressure.   Specifically denies chest pain, palpitations, dyspnea, orthopnea, PND or peripheral edema or neuro sx.  No

## 2024-05-05 SDOH — HEALTH STABILITY: PHYSICAL HEALTH
ON AVERAGE, HOW MANY DAYS PER WEEK DO YOU ENGAGE IN MODERATE TO STRENUOUS EXERCISE (LIKE A BRISK WALK)?: PATIENT DECLINED

## 2024-05-05 ASSESSMENT — PATIENT HEALTH QUESTIONNAIRE - PHQ9
1. LITTLE INTEREST OR PLEASURE IN DOING THINGS: NEARLY EVERY DAY
4. FEELING TIRED OR HAVING LITTLE ENERGY: NEARLY EVERY DAY
3. TROUBLE FALLING OR STAYING ASLEEP: NEARLY EVERY DAY
SUM OF ALL RESPONSES TO PHQ QUESTIONS 1-9: 15
SUM OF ALL RESPONSES TO PHQ QUESTIONS 1-9: 15
7. TROUBLE CONCENTRATING ON THINGS, SUCH AS READING THE NEWSPAPER OR WATCHING TELEVISION: SEVERAL DAYS
2. FEELING DOWN, DEPRESSED OR HOPELESS: SEVERAL DAYS
SUM OF ALL RESPONSES TO PHQ QUESTIONS 1-9: 15
SUM OF ALL RESPONSES TO PHQ QUESTIONS 1-9: 15
6. FEELING BAD ABOUT YOURSELF - OR THAT YOU ARE A FAILURE OR HAVE LET YOURSELF OR YOUR FAMILY DOWN: SEVERAL DAYS
10. IF YOU CHECKED OFF ANY PROBLEMS, HOW DIFFICULT HAVE THESE PROBLEMS MADE IT FOR YOU TO DO YOUR WORK, TAKE CARE OF THINGS AT HOME, OR GET ALONG WITH OTHER PEOPLE: NOT DIFFICULT AT ALL
5. POOR APPETITE OR OVEREATING: NEARLY EVERY DAY
SUM OF ALL RESPONSES TO PHQ9 QUESTIONS 1 & 2: 4
9. THOUGHTS THAT YOU WOULD BE BETTER OFF DEAD, OR OF HURTING YOURSELF: NOT AT ALL
8. MOVING OR SPEAKING SO SLOWLY THAT OTHER PEOPLE COULD HAVE NOTICED. OR THE OPPOSITE, BEING SO FIGETY OR RESTLESS THAT YOU HAVE BEEN MOVING AROUND A LOT MORE THAN USUAL: NOT AT ALL

## 2024-05-05 ASSESSMENT — LIFESTYLE VARIABLES
HOW OFTEN DO YOU HAVE A DRINK CONTAINING ALCOHOL: 2-4 TIMES A MONTH
HOW MANY STANDARD DRINKS CONTAINING ALCOHOL DO YOU HAVE ON A TYPICAL DAY: 1 OR 2
HOW MANY STANDARD DRINKS CONTAINING ALCOHOL DO YOU HAVE ON A TYPICAL DAY: 1
HOW OFTEN DO YOU HAVE SIX OR MORE DRINKS ON ONE OCCASION: 1

## 2024-05-07 ENCOUNTER — TELEMEDICINE (OUTPATIENT)
Dept: FAMILY MEDICINE CLINIC | Age: 84
End: 2024-05-07

## 2024-05-07 DIAGNOSIS — Z00.00 MEDICARE ANNUAL WELLNESS VISIT, SUBSEQUENT: Primary | ICD-10-CM

## 2024-05-07 NOTE — PROGRESS NOTES
Medicare Annual Wellness Visit    Marianela Quintana is here for Medicare AWV    Assessment & Plan   Medicare annual wellness visit, subsequent  Recommendations for Preventive Services Due: see orders and patient instructions/AVS.  Recommended screening schedule for the next 5-10 years is provided to the patient in written form: see Patient Instructions/AVS.     No follow-ups on file.     Subjective       Patient's complete Health Risk Assessment and screening values have been reviewed and are found in Flowsheets. The following problems were reviewed today and where indicated follow up appointments were made and/or referrals ordered.    Positive Risk Factor Screenings with Interventions:    Fall Risk:  Do you feel unsteady or are you worried about falling? : (!) yes  2 or more falls in past year?: (!) yes  Fall with injury in past year?: no     Interventions:    Reviewed medications, home hazards, visual acuity, and co-morbidities that can increase risk for falls  See AVS for additional education material     Depression:  PHQ-2 Score: 4  PHQ-9 Total Score: 15    Interpretation:  5-9 mild   10-14 moderate   15-19 moderately severe   20-27 severe     Interventions:  Taking medication as prescribed       Controlled Medication Review:      Today's Pain Level: No data recorded   Opioid Risk: (Low risk score <55) Opioid risk score: 12    Patient is low risk for opioid use disorder or overdose.    Last PDMP [place] as Reviewed:  Review User Review Instant Review Result   GIANNA SALAZAR 4/29/2024  1:05 PM     Reviewed PDMP [1]     Last Controlled Substance Monitoring Documentation      Flowsheet Row Office Visit from 4/29/2024 in [facility]   Periodic Controlled Substance Monitoring Possible medication side effects, risk of tolerance/dependence & alternative treatments discussed., No signs of potential drug abuse or diversion identified., Assessed functional status (ability to engage in work or

## 2024-05-09 ENCOUNTER — OFFICE VISIT (OUTPATIENT)
Dept: PULMONOLOGY | Age: 84
End: 2024-05-09

## 2024-05-09 VITALS
OXYGEN SATURATION: 92 % | DIASTOLIC BLOOD PRESSURE: 77 MMHG | BODY MASS INDEX: 34.46 KG/M2 | TEMPERATURE: 96.9 F | HEART RATE: 74 BPM | HEIGHT: 59 IN | RESPIRATION RATE: 16 BRPM | SYSTOLIC BLOOD PRESSURE: 150 MMHG

## 2024-05-09 DIAGNOSIS — J44.9 CHRONIC OBSTRUCTIVE PULMONARY DISEASE, UNSPECIFIED COPD TYPE (HCC): Primary | ICD-10-CM

## 2024-05-09 NOTE — PROGRESS NOTES
MA Communication:  The following orders are received by verbal communication from Kristen Oliver MD    Orders include:    12 year follow up

## 2024-05-09 NOTE — PATIENT INSTRUCTIONS
No need for inhalers currently.  Will continue to monitor closely for symptoms  Sleep hygiene education will be provided for insomnia.  Recommended Tylenol as well as gabapentin as needed for pain at the site of chest surgery.  Patient has gabapentin at home from time of surgery.  Follow-up in 12 months      Health Maintenance/Preventive measures:        >>  Avoid exposure to tobacco, irritants, allergens as possible as well as contact with patients with infectious respiratory illness.        >>  Stay up-to-date with influenza & pneumonia vaccines, RSV, & COVID-19 vaccine as recommended by the Advisory Committee on Immunization Practices (ACIP)        >>  Healthy diet and activity as able.        >>  Acid reflux precautions: Head of bed elevation, avoiding tight clothes, avoiding big meals or snacking 3 hours before bedtime, targeting healthy weight.        >>  Practice sleep hygiene measures. Avoid driving or operating heavy machines if tired or sleepy.

## 2024-05-25 DIAGNOSIS — E11.69 TYPE 2 DIABETES MELLITUS WITH OTHER SPECIFIED COMPLICATION, WITHOUT LONG-TERM CURRENT USE OF INSULIN (HCC): ICD-10-CM

## 2024-05-28 RX ORDER — DAPAGLIFLOZIN 10 MG/1
10 TABLET, FILM COATED ORAL EVERY MORNING
Qty: 90 TABLET | Refills: 1 | Status: SHIPPED | OUTPATIENT
Start: 2024-05-28

## 2024-05-28 NOTE — TELEPHONE ENCOUNTER
Marianela Quintana is requesting refill(s) farxiga  Last OV 4/30/24 (pertaining to medication)  LR 1/23/24 (per medication requested)  Next office visit scheduled or attempted Yes   If no, reason:  7/30/24

## 2024-06-14 RX ORDER — VALACYCLOVIR HYDROCHLORIDE 1 G/1
TABLET, FILM COATED ORAL
Qty: 20 TABLET | Refills: 0 | Status: SHIPPED | OUTPATIENT
Start: 2024-06-14

## 2024-06-14 NOTE — TELEPHONE ENCOUNTER
Marianela Quintana is requesting refill(s) valtrex  Last OV 1/23/24 (pertaining to medication)  LR 1/23/24 (per medication requested)  Next office visit scheduled or attempted Yes   If no, reason:  7/30/24

## 2024-07-09 DIAGNOSIS — E78.1 HYPERTRIGLYCERIDEMIA: Chronic | ICD-10-CM

## 2024-07-09 DIAGNOSIS — E78.5 DYSLIPIDEMIA WITH HIGH LDL AND LOW HDL: ICD-10-CM

## 2024-07-10 RX ORDER — ATORVASTATIN CALCIUM 40 MG/1
40 TABLET, FILM COATED ORAL DAILY
Qty: 90 TABLET | Refills: 2 | Status: SHIPPED | OUTPATIENT
Start: 2024-07-10

## 2024-07-29 ENCOUNTER — OFFICE VISIT (OUTPATIENT)
Dept: ORTHOPEDIC SURGERY | Age: 84
End: 2024-07-29
Payer: MEDICARE

## 2024-07-29 VITALS — BODY MASS INDEX: 34.27 KG/M2 | HEIGHT: 59 IN | WEIGHT: 170 LBS

## 2024-07-29 DIAGNOSIS — G89.4 CHRONIC PAIN SYNDROME: ICD-10-CM

## 2024-07-29 DIAGNOSIS — M51.36 DDD (DEGENERATIVE DISC DISEASE), LUMBAR: ICD-10-CM

## 2024-07-29 DIAGNOSIS — M54.16 LUMBAR RADICULITIS: ICD-10-CM

## 2024-07-29 PROCEDURE — 1036F TOBACCO NON-USER: CPT | Performed by: PHYSICIAN ASSISTANT

## 2024-07-29 PROCEDURE — G8400 PT W/DXA NO RESULTS DOC: HCPCS | Performed by: PHYSICIAN ASSISTANT

## 2024-07-29 PROCEDURE — 1090F PRES/ABSN URINE INCON ASSESS: CPT | Performed by: PHYSICIAN ASSISTANT

## 2024-07-29 PROCEDURE — 99214 OFFICE O/P EST MOD 30 MIN: CPT | Performed by: PHYSICIAN ASSISTANT

## 2024-07-29 PROCEDURE — 80305 DRUG TEST PRSMV DIR OPT OBS: CPT | Performed by: PHYSICIAN ASSISTANT

## 2024-07-29 PROCEDURE — G8417 CALC BMI ABV UP PARAM F/U: HCPCS | Performed by: PHYSICIAN ASSISTANT

## 2024-07-29 PROCEDURE — 1123F ACP DISCUSS/DSCN MKR DOCD: CPT | Performed by: PHYSICIAN ASSISTANT

## 2024-07-29 PROCEDURE — G8427 DOCREV CUR MEDS BY ELIG CLIN: HCPCS | Performed by: PHYSICIAN ASSISTANT

## 2024-07-29 RX ORDER — HYDROCODONE BITARTRATE AND ACETAMINOPHEN 5; 325 MG/1; MG/1
1 TABLET ORAL PRN
Qty: 30 TABLET | Refills: 0 | Status: SHIPPED | OUTPATIENT
Start: 2024-07-29 | End: 2024-08-28

## 2024-07-29 RX ORDER — HYDROCODONE BITARTRATE AND ACETAMINOPHEN 5; 325 MG/1; MG/1
1 TABLET ORAL PRN
Qty: 30 TABLET | Refills: 0 | Status: SHIPPED | OUTPATIENT
Start: 2024-09-25 | End: 2024-10-25

## 2024-07-29 RX ORDER — HYDROCODONE BITARTRATE AND ACETAMINOPHEN 5; 325 MG/1; MG/1
1 TABLET ORAL PRN
Qty: 30 TABLET | Refills: 0 | Status: SHIPPED | OUTPATIENT
Start: 2024-08-27 | End: 2024-09-26

## 2024-07-29 NOTE — PROGRESS NOTES
depression symptoms    3. Reduction of reliance on opioid analgesia/more appropriate opioid use. Utilization of adjuvant medications as appropriate.     Risks and benefits of the medications and alternative treatments have been discussed with the patient. The patient was advised against drinking alcohol with the opioid pain medicines, advised against driving or handling machinery when starting or adjusting the dose of medicines, feeling groggy or drowsy, or if having any cognitive issues related to the current medications. The patient is fully aware of the risk of potential addiction, overdose and death, if medicines are misused and not taken as prescribed.  Planned duration of treatment until patient is able to be functional with less pain. Patient will follow up every 3 months or sooner, if needed. Discussed patient's responsibility to safely store and appropriately dispose up medication.  Prescription for Narcan offered. If the patient develops new symptoms or if the symptoms worsen, the patient was told to call the office.      Jolie Steen PA-C, MPAS  Board Certified by the NCCPA

## 2024-07-30 ENCOUNTER — OFFICE VISIT (OUTPATIENT)
Dept: FAMILY MEDICINE CLINIC | Age: 84
End: 2024-07-30

## 2024-07-30 VITALS
DIASTOLIC BLOOD PRESSURE: 56 MMHG | BODY MASS INDEX: 33.47 KG/M2 | HEIGHT: 59 IN | WEIGHT: 166 LBS | SYSTOLIC BLOOD PRESSURE: 122 MMHG | OXYGEN SATURATION: 97 % | HEART RATE: 67 BPM

## 2024-07-30 DIAGNOSIS — I25.84 CORONARY ARTERY DISEASE DUE TO CALCIFIED CORONARY LESION: ICD-10-CM

## 2024-07-30 DIAGNOSIS — E66.9 CLASS 1 OBESITY WITH SERIOUS COMORBIDITY AND BODY MASS INDEX (BMI) OF 33.0 TO 33.9 IN ADULT, UNSPECIFIED OBESITY TYPE: ICD-10-CM

## 2024-07-30 DIAGNOSIS — I10 ESSENTIAL HYPERTENSION: ICD-10-CM

## 2024-07-30 DIAGNOSIS — F32.1 CURRENT MODERATE EPISODE OF MAJOR DEPRESSIVE DISORDER, UNSPECIFIED WHETHER RECURRENT (HCC): ICD-10-CM

## 2024-07-30 DIAGNOSIS — J44.9 CHRONIC OBSTRUCTIVE PULMONARY DISEASE, UNSPECIFIED COPD TYPE (HCC): ICD-10-CM

## 2024-07-30 DIAGNOSIS — E78.5 DYSLIPIDEMIA WITH HIGH LDL AND LOW HDL: ICD-10-CM

## 2024-07-30 DIAGNOSIS — F11.20 OPIOID DEPENDENCE WITH CURRENT USE (HCC): ICD-10-CM

## 2024-07-30 DIAGNOSIS — I25.10 CORONARY ARTERY DISEASE DUE TO CALCIFIED CORONARY LESION: ICD-10-CM

## 2024-07-30 DIAGNOSIS — E11.9 TYPE 2 DIABETES MELLITUS WITHOUT COMPLICATION, WITHOUT LONG-TERM CURRENT USE OF INSULIN (HCC): Primary | ICD-10-CM

## 2024-07-30 LAB
ALBUMIN SERPL-MCNC: 4.7 G/DL (ref 3.4–5)
ALBUMIN/GLOB SERPL: 1.5 {RATIO} (ref 1.1–2.2)
ALP SERPL-CCNC: 51 U/L (ref 40–129)
ALT SERPL-CCNC: 17 U/L (ref 10–40)
ANION GAP SERPL CALCULATED.3IONS-SCNC: 16 MMOL/L (ref 3–16)
AST SERPL-CCNC: 15 U/L (ref 15–37)
BILIRUB SERPL-MCNC: 0.3 MG/DL (ref 0–1)
BUN SERPL-MCNC: 23 MG/DL (ref 7–20)
CALCIUM SERPL-MCNC: 10.3 MG/DL (ref 8.3–10.6)
CHLORIDE SERPL-SCNC: 97 MMOL/L (ref 99–110)
CHOLEST SERPL-MCNC: 158 MG/DL (ref 0–199)
CO2 SERPL-SCNC: 24 MMOL/L (ref 21–32)
CREAT SERPL-MCNC: 0.7 MG/DL (ref 0.6–1.2)
GFR SERPLBLD CREATININE-BSD FMLA CKD-EPI: 86 ML/MIN/{1.73_M2}
GLUCOSE SERPL-MCNC: 115 MG/DL (ref 70–99)
HBA1C MFR BLD: 7.3 %
HDLC SERPL-MCNC: 30 MG/DL (ref 40–60)
LDLC SERPL CALC-MCNC: ABNORMAL MG/DL
POTASSIUM SERPL-SCNC: 4.1 MMOL/L (ref 3.5–5.1)
PROT SERPL-MCNC: 7.9 G/DL (ref 6.4–8.2)
SODIUM SERPL-SCNC: 137 MMOL/L (ref 136–145)
TRIGL SERPL-MCNC: 309 MG/DL (ref 0–150)
VLDLC SERPL CALC-MCNC: ABNORMAL MG/DL

## 2024-07-30 RX ORDER — ORAL SEMAGLUTIDE 3 MG/1
3 TABLET ORAL DAILY
Qty: 30 TABLET | Refills: 0 | Status: SHIPPED | COMMUNITY
Start: 2024-07-30

## 2024-07-30 RX ORDER — ORAL SEMAGLUTIDE 7 MG/1
7 TABLET ORAL DAILY
Qty: 30 TABLET | Refills: 3 | Status: SHIPPED | OUTPATIENT
Start: 2024-07-30

## 2024-07-30 RX ORDER — ESCITALOPRAM OXALATE 20 MG/1
TABLET ORAL
Qty: 90 TABLET | Refills: 1 | Status: SHIPPED | OUTPATIENT
Start: 2024-07-30

## 2024-07-30 SDOH — ECONOMIC STABILITY: FOOD INSECURITY: WITHIN THE PAST 12 MONTHS, THE FOOD YOU BOUGHT JUST DIDN'T LAST AND YOU DIDN'T HAVE MONEY TO GET MORE.: NEVER TRUE

## 2024-07-30 SDOH — ECONOMIC STABILITY: FOOD INSECURITY: WITHIN THE PAST 12 MONTHS, YOU WORRIED THAT YOUR FOOD WOULD RUN OUT BEFORE YOU GOT MONEY TO BUY MORE.: NEVER TRUE

## 2024-07-30 SDOH — ECONOMIC STABILITY: INCOME INSECURITY: HOW HARD IS IT FOR YOU TO PAY FOR THE VERY BASICS LIKE FOOD, HOUSING, MEDICAL CARE, AND HEATING?: NOT HARD AT ALL

## 2024-07-30 ASSESSMENT — PATIENT HEALTH QUESTIONNAIRE - PHQ9
1. LITTLE INTEREST OR PLEASURE IN DOING THINGS: NEARLY EVERY DAY
3. TROUBLE FALLING OR STAYING ASLEEP: NEARLY EVERY DAY
SUM OF ALL RESPONSES TO PHQ QUESTIONS 1-9: 12
7. TROUBLE CONCENTRATING ON THINGS, SUCH AS READING THE NEWSPAPER OR WATCHING TELEVISION: NOT AT ALL
2. FEELING DOWN, DEPRESSED OR HOPELESS: NOT AT ALL
SUM OF ALL RESPONSES TO PHQ QUESTIONS 1-9: 12
6. FEELING BAD ABOUT YOURSELF - OR THAT YOU ARE A FAILURE OR HAVE LET YOURSELF OR YOUR FAMILY DOWN: NOT AT ALL
10. IF YOU CHECKED OFF ANY PROBLEMS, HOW DIFFICULT HAVE THESE PROBLEMS MADE IT FOR YOU TO DO YOUR WORK, TAKE CARE OF THINGS AT HOME, OR GET ALONG WITH OTHER PEOPLE: VERY DIFFICULT
SUM OF ALL RESPONSES TO PHQ QUESTIONS 1-9: 12
SUM OF ALL RESPONSES TO PHQ9 QUESTIONS 1 & 2: 3
5. POOR APPETITE OR OVEREATING: NEARLY EVERY DAY
9. THOUGHTS THAT YOU WOULD BE BETTER OFF DEAD, OR OF HURTING YOURSELF: NOT AT ALL
4. FEELING TIRED OR HAVING LITTLE ENERGY: NEARLY EVERY DAY
SUM OF ALL RESPONSES TO PHQ QUESTIONS 1-9: 12
8. MOVING OR SPEAKING SO SLOWLY THAT OTHER PEOPLE COULD HAVE NOTICED. OR THE OPPOSITE, BEING SO FIGETY OR RESTLESS THAT YOU HAVE BEEN MOVING AROUND A LOT MORE THAN USUAL: NOT AT ALL

## 2024-07-30 NOTE — PROGRESS NOTES
Marianela Quintana presents for   Chief Complaint   Patient presents with    Diabetes     Patient is here for a 3 month follow up, she is fasting for blood work    Hyperlipidemia    Hypertension        ASSESSMENT:   Diagnosis Orders   1. Type 2 diabetes mellitus without complication, without long-term current use of insulin ([facility]), controlled, continue Farxiga 10 mg and metformin at 1000 mg twice a day.  Will add Rybelsus 3 mg once a day for 1 month then increase to 7 mg a day POCT glycosylated hemoglobin (Hb A1C)    Semaglutide (RYBELSUS) 7 MG TABS      2. Current moderate episode of major depressive disorder, unspecified whether recurrent ([facility]), controlled, continue Lexapro 20 mg escitalopram (LEXAPRO) 20 MG tablet      3. Essential hypertension, controlled, continue losartan/HCTZ 100/12.5 Comprehensive Metabolic Panel      4. Coronary artery disease due to calcified coronary lesion        5. Dyslipidemia with high LDL and low HDL, labs are pending we will see how Lipitor 40 mg and Tricor 54 mg are working Lipid Panel      6. Chronic obstructive pulmonary disease, unspecified COPD type ([facility]), controlled, continue pulmonology follow-up       7. Opioid dependence with current use ([facility]), controlled, continue pain management follow-up       8. Class 1 obesity with serious comorbidity and body mass index (BMI) of 33.0 to 33.9 in adult, unspecified obesity type, needs improvement, patient asking for medication to help with weight loss.  Since she is diabetic we can use semaglutide.  Patient was given 1 month supply of 3 mg of Rybelsus to take daily, prescription for 7 mg sent to the pharmacy for her            Plan:  1)  Medication: Patient was given a sample of Rybelsus 3 mg daily for 30 days then increase to 7 mg daily call if any problems, other chronic medications are working and tolerated, continue as listed  2)  Recheck in 3 months, sooner should new symptoms or problems arise.  3) LLR    “I spent a total of 40*

## 2024-07-31 LAB — LDLC SERPL-MCNC: 77 MG/DL

## 2024-09-27 RX ORDER — VALACYCLOVIR HYDROCHLORIDE 1 G/1
TABLET, FILM COATED ORAL
Qty: 20 TABLET | Refills: 5 | Status: SHIPPED | OUTPATIENT
Start: 2024-09-27

## 2024-10-21 ENCOUNTER — OFFICE VISIT (OUTPATIENT)
Dept: ORTHOPEDIC SURGERY | Age: 84
End: 2024-10-21
Payer: MEDICARE

## 2024-10-21 VITALS — WEIGHT: 166 LBS | BODY MASS INDEX: 33.47 KG/M2 | HEIGHT: 59 IN

## 2024-10-21 DIAGNOSIS — M54.16 LUMBAR RADICULITIS: ICD-10-CM

## 2024-10-21 DIAGNOSIS — G89.4 CHRONIC PAIN SYNDROME: ICD-10-CM

## 2024-10-21 DIAGNOSIS — M51.360 DEGENERATION OF INTERVERTEBRAL DISC OF LUMBAR REGION WITH DISCOGENIC BACK PAIN: Primary | ICD-10-CM

## 2024-10-21 PROCEDURE — 1123F ACP DISCUSS/DSCN MKR DOCD: CPT | Performed by: PHYSICIAN ASSISTANT

## 2024-10-21 PROCEDURE — G8427 DOCREV CUR MEDS BY ELIG CLIN: HCPCS | Performed by: PHYSICIAN ASSISTANT

## 2024-10-21 PROCEDURE — G8400 PT W/DXA NO RESULTS DOC: HCPCS | Performed by: PHYSICIAN ASSISTANT

## 2024-10-21 PROCEDURE — G8484 FLU IMMUNIZE NO ADMIN: HCPCS | Performed by: PHYSICIAN ASSISTANT

## 2024-10-21 PROCEDURE — 99213 OFFICE O/P EST LOW 20 MIN: CPT | Performed by: PHYSICIAN ASSISTANT

## 2024-10-21 PROCEDURE — 1036F TOBACCO NON-USER: CPT | Performed by: PHYSICIAN ASSISTANT

## 2024-10-21 PROCEDURE — G8417 CALC BMI ABV UP PARAM F/U: HCPCS | Performed by: PHYSICIAN ASSISTANT

## 2024-10-21 PROCEDURE — 1090F PRES/ABSN URINE INCON ASSESS: CPT | Performed by: PHYSICIAN ASSISTANT

## 2024-10-21 RX ORDER — HYDROCODONE BITARTRATE AND ACETAMINOPHEN 5; 325 MG/1; MG/1
1 TABLET ORAL PRN
Qty: 30 TABLET | Refills: 0 | Status: SHIPPED | OUTPATIENT
Start: 2024-12-04 | End: 2025-01-03

## 2024-10-21 RX ORDER — HYDROCODONE BITARTRATE AND ACETAMINOPHEN 5; 325 MG/1; MG/1
1 TABLET ORAL PRN
Qty: 30 TABLET | Refills: 0 | Status: SHIPPED | OUTPATIENT
Start: 2025-01-02 | End: 2025-02-01

## 2024-10-21 RX ORDER — HYDROCODONE BITARTRATE AND ACETAMINOPHEN 5; 325 MG/1; MG/1
1 TABLET ORAL PRN
Qty: 30 TABLET | Refills: 0 | Status: SHIPPED | OUTPATIENT
Start: 2024-11-06 | End: 2024-12-06

## 2024-10-21 NOTE — PROGRESS NOTES
SPINE: Follow Up     CHIEF COMPLAINT: Chronic low back pain, follow-up    HISTORY OF PRESENT ILLNESS:                The patient is a 84 y.o. female well known to me, here for chronic back pain and medication maintenance. She reports chronic aching low back pain.  Periodically pain will radiate into the right lateral thigh/calf to the foot with tingling.  Her low back pain is most bothersome.  Her pain is increased with prolonged activity, bending, lifting.  She reports pain after prolonged house hold activities and as the day progresses. She still reports significant relief with Norco 5mg qd PRN, rest and heat.  She is s/p left lower lobectomy with Dr. Cox for lung cancer--still following. Her low back pain is well managed with Norco 5/325 I po qd PRN without side effects. Medication does allow her to be more functional--able to perform ADLs independently, travel.  She has a sister that lives in [place] that she visits with frequently--unsure if she will go there this fall. Her medication allows her to continue ADLs independently.  She is typically able to sleep >6hr/night--at times has trouble falling asleep but states this is not due to her back. She is able to care for her 9 cats.  She denies any progressive numbness tingling or LE weakness.  No recent fevers chills or infections.  Pain overall chronic and stable.     Current/Past Treatment:   Physical Therapy: YES  Chiropractic: no  Injection: Multiple prior injections & procedures: ESIs, RFN: Dr. Pantoja, R IA hip inj--Dr. Park  Medications: Norco 5mg I po qd PRN, prior oral steroids, NSAIDs, post-op percocet (Dr. Trejo)    Surgery/Consult: h/o lumbar decompression, Dr. Colvin      Function-Does the pain medication improve your ability to do:   Personal care: Yes  Housework: Yes   Physical activity: Yes  Social activity: Yes     Pain Scale: 1-10  With Meds:  0/10  Pain Scale: 1-10 Without Meds: 7-9/10     Potential aberrant drug-related

## 2024-10-28 ENCOUNTER — HOSPITAL ENCOUNTER (OUTPATIENT)
Dept: CT IMAGING | Age: 84
Discharge: HOME | End: 2024-10-28
Attending: INTERNAL MEDICINE
Payer: MEDICARE

## 2024-10-28 DIAGNOSIS — C34.32 MALIGNANT NEOPLASM OF LOWER LOBE, LEFT BRONCHUS OR LUNG (HCC): ICD-10-CM

## 2024-10-28 PROCEDURE — 71250 CT THORAX DX C-: CPT

## 2024-11-05 DIAGNOSIS — I10 ESSENTIAL HYPERTENSION: ICD-10-CM

## 2024-11-05 RX ORDER — LOSARTAN POTASSIUM AND HYDROCHLOROTHIAZIDE 12.5; 1 MG/1; MG/1
TABLET ORAL
Qty: 90 TABLET | Refills: 1 | Status: SHIPPED | OUTPATIENT
Start: 2024-11-05

## 2024-11-05 NOTE — TELEPHONE ENCOUNTER
Marianela Quintana is requesting refill(s) metformin, losartan-hctz  Last OV 7/30/24 (pertaining to medication)  LR 4/19/24 (per medication requested)  Next office visit scheduled or attempted Yes   If no, reason:  11/11/24

## 2024-11-11 ENCOUNTER — OFFICE VISIT (OUTPATIENT)
Dept: FAMILY MEDICINE CLINIC | Age: 84
End: 2024-11-11

## 2024-11-11 VITALS
WEIGHT: 165.8 LBS | OXYGEN SATURATION: 97 % | HEART RATE: 69 BPM | BODY MASS INDEX: 33.43 KG/M2 | DIASTOLIC BLOOD PRESSURE: 62 MMHG | HEIGHT: 59 IN | SYSTOLIC BLOOD PRESSURE: 124 MMHG

## 2024-11-11 DIAGNOSIS — I10 ESSENTIAL HYPERTENSION: ICD-10-CM

## 2024-11-11 DIAGNOSIS — J44.9 CHRONIC OBSTRUCTIVE PULMONARY DISEASE, UNSPECIFIED COPD TYPE (HCC): ICD-10-CM

## 2024-11-11 DIAGNOSIS — F32.1 CURRENT MODERATE EPISODE OF MAJOR DEPRESSIVE DISORDER, UNSPECIFIED WHETHER RECURRENT (HCC): ICD-10-CM

## 2024-11-11 DIAGNOSIS — I25.10 CORONARY ARTERY DISEASE DUE TO CALCIFIED CORONARY LESION: ICD-10-CM

## 2024-11-11 DIAGNOSIS — E11.69 TYPE 2 DIABETES MELLITUS WITH OTHER SPECIFIED COMPLICATION, WITHOUT LONG-TERM CURRENT USE OF INSULIN (HCC): Primary | ICD-10-CM

## 2024-11-11 DIAGNOSIS — I35.0 AORTIC VALVE STENOSIS, ETIOLOGY OF CARDIAC VALVE DISEASE UNSPECIFIED: ICD-10-CM

## 2024-11-11 DIAGNOSIS — E78.5 DYSLIPIDEMIA WITH HIGH LDL AND LOW HDL: ICD-10-CM

## 2024-11-11 DIAGNOSIS — I25.84 CORONARY ARTERY DISEASE DUE TO CALCIFIED CORONARY LESION: ICD-10-CM

## 2024-11-11 LAB — HBA1C MFR BLD: 7.8 %

## 2024-11-11 RX ORDER — SEMAGLUTIDE 1.34 MG/ML
0.25 INJECTION, SOLUTION SUBCUTANEOUS WEEKLY
Qty: 4 ADJUSTABLE DOSE PRE-FILLED PEN SYRINGE | Refills: 1 | Status: CANCELLED | OUTPATIENT
Start: 2024-11-11

## 2024-11-11 NOTE — PROGRESS NOTES
Marianela Quintana presents for   Chief Complaint   Patient presents with    Diabetes     Pt states that she is here for a 3 month f/u, is not fasting for bw. Has not been taking the Rybelsus for one month    Depression    Hypertension        ASSESSMENT:   Diagnosis Orders   1. Type 2 diabetes mellitus with other specified complication, without long-term current use of insulin (HCC), needs improvement.  A1c 7.8 today.  Will start on semaglutide 0.25 mg weekly, first shot given here she will return in 1 month and we will increase the dose at that visi.  Continue Farxiga 10 mg and metformin at 1000 mg twice a day Semaglutide,0.25 or 0.5MG/DOS, 2 MG/1.5ML SOPN      2. Coronary artery disease due to calcified coronary lesion, asymptomatic, continue cardiology follow-up Semaglutide,0.25 or 0.5MG/DOS, 2 MG/1.5ML SOPN      3. Chronic obstructive pulmonary disease, unspecified COPD type (HCC), controlled, continue pulmonology follow-up       4. Dyslipidemia with high LDL and low HDL, controlled, continue Lipitor 40 mg and Tricor 54 mg daily       5. Essential hypertension, controlled, continue losartan/HCTZ 100/12.5       6. Aortic valve stenosis, etiology of cardiac valve disease unspecified, asymptomatic, continue cardiology follow-up    7.  Depression, continue Lexapro 20 mg daily        Plan:  1)  Medication: Patient has already discontinued Rybelsus.  Will start semaglutide 0.25 mg weekly.  First dose given today she will return in 1 month so we can increase the dose, other chronic medications are working and tolerated, continue as listed  2)  Recheck in 1 months, sooner should new symptoms or problems arise.  3) LLR, reviewed          SUBJECTIVE:  Marianela Quintana is a 84 y.o. female who presents for evaluation of aortic valve stenosis, diabetes, depression, CAD, COPD, hypertension and hyperlipidemia. She indicates that she is feeling well and denies any symptoms referable to her elevated blood pressure.   Specifically

## 2024-11-26 ENCOUNTER — TELEPHONE (OUTPATIENT)
Dept: ORTHOPEDIC SURGERY | Age: 84
End: 2024-11-26

## 2024-11-26 NOTE — TELEPHONE ENCOUNTER
Contacted the patient informing her I was calling regarding her follow up appointment with Jolie Steen PA-C. She was informed that Jolie is no longer going to [place]. She will either go to the [place] or [place] office. The patient was offered to reschedule her follow up appointment at [place]. She voiced understanding and wishes to be scheduled at our [place] office. I rescheduled the patient for Monday 1/6/2024 at 1PM at our [place] office with Jolie Steen PA-C. She was provided the address.

## 2024-12-05 DIAGNOSIS — G89.4 CHRONIC PAIN SYNDROME: ICD-10-CM

## 2024-12-05 DIAGNOSIS — M54.16 LUMBAR RADICULITIS: ICD-10-CM

## 2024-12-05 DIAGNOSIS — M51.360 DEGENERATION OF INTERVERTEBRAL DISC OF LUMBAR REGION WITH DISCOGENIC BACK PAIN: ICD-10-CM

## 2024-12-05 RX ORDER — HYDROCODONE BITARTRATE AND ACETAMINOPHEN 5; 325 MG/1; MG/1
1 TABLET ORAL DAILY PRN
Qty: 30 TABLET | Refills: 0 | Status: SHIPPED | OUTPATIENT
Start: 2024-12-05 | End: 2025-01-04

## 2024-12-05 RX ORDER — HYDROCODONE BITARTRATE AND ACETAMINOPHEN 5; 325 MG/1; MG/1
1 TABLET ORAL PRN
Qty: 30 TABLET | Refills: 0 | Status: CANCELLED | OUTPATIENT
Start: 2025-01-02 | End: 2025-02-01

## 2024-12-11 ENCOUNTER — OFFICE VISIT (OUTPATIENT)
Dept: FAMILY MEDICINE CLINIC | Age: 84
End: 2024-12-11

## 2024-12-11 VITALS
OXYGEN SATURATION: 98 % | BODY MASS INDEX: 32.66 KG/M2 | HEIGHT: 59 IN | SYSTOLIC BLOOD PRESSURE: 122 MMHG | DIASTOLIC BLOOD PRESSURE: 64 MMHG | HEART RATE: 76 BPM | WEIGHT: 162 LBS

## 2024-12-11 DIAGNOSIS — E11.69 TYPE 2 DIABETES MELLITUS WITH OTHER SPECIFIED COMPLICATION, WITHOUT LONG-TERM CURRENT USE OF INSULIN (HCC): Primary | ICD-10-CM

## 2024-12-11 DIAGNOSIS — E11.9 DIABETES MELLITUS TREATED WITH INJECTIONS OF NON-INSULIN MEDICATION (HCC): ICD-10-CM

## 2024-12-11 DIAGNOSIS — K21.9 GASTROESOPHAGEAL REFLUX DISEASE, UNSPECIFIED WHETHER ESOPHAGITIS PRESENT: ICD-10-CM

## 2024-12-11 DIAGNOSIS — Z79.85 DIABETES MELLITUS TREATED WITH INJECTIONS OF NON-INSULIN MEDICATION (HCC): ICD-10-CM

## 2024-12-11 DIAGNOSIS — I25.84 CORONARY ARTERY DISEASE DUE TO CALCIFIED CORONARY LESION: ICD-10-CM

## 2024-12-11 DIAGNOSIS — I25.10 CORONARY ARTERY DISEASE DUE TO CALCIFIED CORONARY LESION: ICD-10-CM

## 2024-12-11 RX ORDER — PANTOPRAZOLE SODIUM 40 MG/1
40 TABLET, DELAYED RELEASE ORAL
Qty: 90 TABLET | Refills: 1 | Status: SHIPPED | OUTPATIENT
Start: 2024-12-11

## 2024-12-11 NOTE — PROGRESS NOTES
Marianela Quintana presents for   Chief Complaint   Patient presents with    Diabetes     Pt states that she is here for a 4 week f/u, she was started on Ozempic at her last appt. She states that she has not been having any issues with this new med        ASSESSMENT:   Diagnosis Orders   1. Type 2 diabetes mellitus with other specified complication, without long-term current use of insulin ([facility]), uncertain control, continue Farxiga 10 mg, metformin at 1000 mg twice a day and Ozempic dose is increased to 0.5 mg weekly Semaglutide,0.25 or 0.5MG/DOS, 2 MG/1.5ML SOPN      2. Coronary artery disease due to calcified coronary lesion, currently asymptomatic would benefit from cardiovascular benefits of GLP-1 Semaglutide,0.25 or 0.5MG/DOS, 2 MG/1.5ML SOPN      3. Gastroesophageal reflux disease, unspecified whether esophagitis present, controlled, patient needs refill on pantoprazole pantoprazole (PROTONIX) 40 MG tablet      4. Diabetes mellitus treated with injections of non-insulin medication (HCC), uncertain control, increase Ozempic to 0.5 mg weekly            Plan:  1)  Medication: Semaglutide dose increased to 0.5 mg weekly, other chronic medications are working and tolerated, continue as listed  2)  Recheck in 2 months, sooner should new symptoms or problems arise.  3) LLR, reviewed          SUBJECTIVE:  Marianela Quintana is a 84 y.o. female who presents for evaluation of diabetes, CAD, GERD . She indicates that she is feeling well.  She denies any side effects of medication, and has been taking it regularly.   Lab Results   Component Value Date    CHOL 158 07/30/2024    TRIG 309 (H) 07/30/2024    HDL 30 (L) 07/30/2024    LDL see below 07/30/2024    VLDL see below 07/30/2024    CHOLHDLRATIO 5.2 (H) 01/21/2016        Patient seen 1 month ago and Ozempic 0.25 mg weekly was initiated.  It has been 4 weeks and patient is tolerating without issue.  We discussed that this is a subtherapeutic dose.  We will increase her dose to

## 2024-12-20 DIAGNOSIS — E11.69 TYPE 2 DIABETES MELLITUS WITH OTHER SPECIFIED COMPLICATION, WITHOUT LONG-TERM CURRENT USE OF INSULIN (HCC): ICD-10-CM

## 2024-12-20 NOTE — TELEPHONE ENCOUNTER
Marianela Quintana is requesting refill(s) farxiga  Last OV 12/11/24 (pertaining to medication)  LR 5/28/24 (per medication requested)  Next office visit scheduled or attempted Yes   If no, reason:  2/11/25

## 2024-12-21 RX ORDER — DAPAGLIFLOZIN 10 MG/1
10 TABLET, FILM COATED ORAL EVERY MORNING
Qty: 90 TABLET | Refills: 1 | Status: SHIPPED | OUTPATIENT
Start: 2024-12-21

## 2025-01-16 LAB — DIABETIC RETINOPATHY: NEGATIVE

## 2025-02-11 ENCOUNTER — OFFICE VISIT (OUTPATIENT)
Dept: FAMILY MEDICINE CLINIC | Age: 85
End: 2025-02-11

## 2025-02-11 VITALS
SYSTOLIC BLOOD PRESSURE: 118 MMHG | DIASTOLIC BLOOD PRESSURE: 60 MMHG | HEIGHT: 59 IN | BODY MASS INDEX: 33.18 KG/M2 | WEIGHT: 164.6 LBS

## 2025-02-11 DIAGNOSIS — I25.84 CORONARY ARTERY DISEASE DUE TO CALCIFIED CORONARY LESION: ICD-10-CM

## 2025-02-11 DIAGNOSIS — F32.1 CURRENT MODERATE EPISODE OF MAJOR DEPRESSIVE DISORDER, UNSPECIFIED WHETHER RECURRENT (HCC): ICD-10-CM

## 2025-02-11 DIAGNOSIS — K21.9 GASTROESOPHAGEAL REFLUX DISEASE, UNSPECIFIED WHETHER ESOPHAGITIS PRESENT: ICD-10-CM

## 2025-02-11 DIAGNOSIS — I10 ESSENTIAL HYPERTENSION: ICD-10-CM

## 2025-02-11 DIAGNOSIS — J44.9 CHRONIC OBSTRUCTIVE PULMONARY DISEASE, UNSPECIFIED COPD TYPE (HCC): ICD-10-CM

## 2025-02-11 DIAGNOSIS — I35.0 AORTIC VALVE STENOSIS, ETIOLOGY OF CARDIAC VALVE DISEASE UNSPECIFIED: ICD-10-CM

## 2025-02-11 DIAGNOSIS — F11.20 OPIOID DEPENDENCE WITH CURRENT USE (HCC): ICD-10-CM

## 2025-02-11 DIAGNOSIS — B00.9 RECURRENT HSV (HERPES SIMPLEX VIRUS): ICD-10-CM

## 2025-02-11 DIAGNOSIS — I25.10 CORONARY ARTERY DISEASE DUE TO CALCIFIED CORONARY LESION: ICD-10-CM

## 2025-02-11 DIAGNOSIS — E78.5 DYSLIPIDEMIA WITH HIGH LDL AND LOW HDL: ICD-10-CM

## 2025-02-11 DIAGNOSIS — Z79.85 DIABETES MELLITUS TREATED WITH INJECTIONS OF NON-INSULIN MEDICATION (HCC): ICD-10-CM

## 2025-02-11 DIAGNOSIS — E11.9 DIABETES MELLITUS TREATED WITH INJECTIONS OF NON-INSULIN MEDICATION (HCC): ICD-10-CM

## 2025-02-11 DIAGNOSIS — E11.69 TYPE 2 DIABETES MELLITUS WITH OTHER SPECIFIED COMPLICATION, WITHOUT LONG-TERM CURRENT USE OF INSULIN (HCC): Primary | ICD-10-CM

## 2025-02-11 DIAGNOSIS — Z85.118 HISTORY OF LUNG CANCER: ICD-10-CM

## 2025-02-11 PROBLEM — C34.90 NON-SMALL CELL LUNG CANCER, UNSPECIFIED LATERALITY (HCC): Status: RESOLVED | Noted: 2024-01-23 | Resolved: 2025-02-11

## 2025-02-11 LAB
ALBUMIN SERPL-MCNC: 4.5 G/DL (ref 3.4–5)
ALBUMIN/GLOB SERPL: 1.6 {RATIO} (ref 1.1–2.2)
ALP SERPL-CCNC: 47 U/L (ref 40–129)
ALT SERPL-CCNC: 15 U/L (ref 10–40)
ANION GAP SERPL CALCULATED.3IONS-SCNC: 13 MMOL/L (ref 3–16)
AST SERPL-CCNC: 15 U/L (ref 15–37)
BASOPHILS # BLD: 0.1 K/UL (ref 0–0.2)
BASOPHILS NFR BLD: 0.9 %
BILIRUB SERPL-MCNC: 0.3 MG/DL (ref 0–1)
BUN SERPL-MCNC: 19 MG/DL (ref 7–20)
CALCIUM SERPL-MCNC: 9.9 MG/DL (ref 8.3–10.6)
CHLORIDE SERPL-SCNC: 100 MMOL/L (ref 99–110)
CHOLEST SERPL-MCNC: 165 MG/DL (ref 0–199)
CO2 SERPL-SCNC: 23 MMOL/L (ref 21–32)
CREAT SERPL-MCNC: 0.7 MG/DL (ref 0.6–1.2)
CREAT UR-MCNC: 119 MG/DL (ref 28–259)
DEPRECATED RDW RBC AUTO: 14.3 % (ref 12.4–15.4)
EOSINOPHIL # BLD: 0.3 K/UL (ref 0–0.6)
EOSINOPHIL NFR BLD: 3.2 %
GFR SERPLBLD CREATININE-BSD FMLA CKD-EPI: 86 ML/MIN/{1.73_M2}
GLUCOSE SERPL-MCNC: 89 MG/DL (ref 70–99)
HBA1C MFR BLD: 6.9 %
HCT VFR BLD AUTO: 40 % (ref 36–48)
HDLC SERPL-MCNC: 35 MG/DL (ref 40–60)
HGB BLD-MCNC: 13.4 G/DL (ref 12–16)
LDLC SERPL CALC-MCNC: 84 MG/DL
LYMPHOCYTES # BLD: 2.5 K/UL (ref 1–5.1)
LYMPHOCYTES NFR BLD: 30.3 %
MCH RBC QN AUTO: 28.3 PG (ref 26–34)
MCHC RBC AUTO-ENTMCNC: 33.4 G/DL (ref 31–36)
MCV RBC AUTO: 84.8 FL (ref 80–100)
MICROALBUMIN UR DL<=1MG/L-MCNC: <1.2 MG/DL
MICROALBUMIN/CREAT UR: NORMAL MG/G (ref 0–30)
MONOCYTES # BLD: 0.6 K/UL (ref 0–1.3)
MONOCYTES NFR BLD: 7.2 %
NEUTROPHILS # BLD: 4.8 K/UL (ref 1.7–7.7)
NEUTROPHILS NFR BLD: 58.4 %
PLATELET # BLD AUTO: 300 K/UL (ref 135–450)
PMV BLD AUTO: 8.2 FL (ref 5–10.5)
POTASSIUM SERPL-SCNC: 4.2 MMOL/L (ref 3.5–5.1)
PROT SERPL-MCNC: 7.4 G/DL (ref 6.4–8.2)
RBC # BLD AUTO: 4.71 M/UL (ref 4–5.2)
SODIUM SERPL-SCNC: 136 MMOL/L (ref 136–145)
TRIGL SERPL-MCNC: 229 MG/DL (ref 0–150)
VLDLC SERPL CALC-MCNC: 46 MG/DL
WBC # BLD AUTO: 8.2 K/UL (ref 4–11)

## 2025-02-11 RX ORDER — VALACYCLOVIR HYDROCHLORIDE 1 G/1
1000 TABLET, FILM COATED ORAL 2 TIMES DAILY
Qty: 20 TABLET | Refills: 2 | Status: SHIPPED | OUTPATIENT
Start: 2025-02-11

## 2025-02-11 SDOH — ECONOMIC STABILITY: FOOD INSECURITY: WITHIN THE PAST 12 MONTHS, YOU WORRIED THAT YOUR FOOD WOULD RUN OUT BEFORE YOU GOT MONEY TO BUY MORE.: NEVER TRUE

## 2025-02-11 SDOH — ECONOMIC STABILITY: FOOD INSECURITY: WITHIN THE PAST 12 MONTHS, THE FOOD YOU BOUGHT JUST DIDN'T LAST AND YOU DIDN'T HAVE MONEY TO GET MORE.: NEVER TRUE

## 2025-02-11 ASSESSMENT — PATIENT HEALTH QUESTIONNAIRE - PHQ9
SUM OF ALL RESPONSES TO PHQ QUESTIONS 1-9: 0
10. IF YOU CHECKED OFF ANY PROBLEMS, HOW DIFFICULT HAVE THESE PROBLEMS MADE IT FOR YOU TO DO YOUR WORK, TAKE CARE OF THINGS AT HOME, OR GET ALONG WITH OTHER PEOPLE: NOT DIFFICULT AT ALL
9. THOUGHTS THAT YOU WOULD BE BETTER OFF DEAD, OR OF HURTING YOURSELF: NOT AT ALL
4. FEELING TIRED OR HAVING LITTLE ENERGY: NOT AT ALL
1. LITTLE INTEREST OR PLEASURE IN DOING THINGS: NOT AT ALL
SUM OF ALL RESPONSES TO PHQ QUESTIONS 1-9: 0
8. MOVING OR SPEAKING SO SLOWLY THAT OTHER PEOPLE COULD HAVE NOTICED. OR THE OPPOSITE, BEING SO FIGETY OR RESTLESS THAT YOU HAVE BEEN MOVING AROUND A LOT MORE THAN USUAL: NOT AT ALL
SUM OF ALL RESPONSES TO PHQ QUESTIONS 1-9: 0
SUM OF ALL RESPONSES TO PHQ QUESTIONS 1-9: 0
5. POOR APPETITE OR OVEREATING: NOT AT ALL
3. TROUBLE FALLING OR STAYING ASLEEP: NOT AT ALL
SUM OF ALL RESPONSES TO PHQ9 QUESTIONS 1 & 2: 0
2. FEELING DOWN, DEPRESSED OR HOPELESS: NOT AT ALL
7. TROUBLE CONCENTRATING ON THINGS, SUCH AS READING THE NEWSPAPER OR WATCHING TELEVISION: NOT AT ALL
6. FEELING BAD ABOUT YOURSELF - OR THAT YOU ARE A FAILURE OR HAVE LET YOURSELF OR YOUR FAMILY DOWN: NOT AT ALL

## 2025-02-11 NOTE — PROGRESS NOTES
Marianela Quintana presents for   Chief Complaint   Patient presents with    Diabetes     Pt states that she is here for a 2 month f/u, is fasting for bw    Hypertension    Hyperlipidemia        ASSESSMENT:   Diagnosis Orders   1. Type 2 diabetes mellitus with other specified complication, without long-term current use of insulin (HCC), improved, A1c today 6.9, continue Farxiga 10 mg, metformin at 1000 mg twice a day and Ozempic 0.5 mg weekly POCT glycosylated hemoglobin (Hb A1C)    Albumin/Creatinine Ratio, Urine      2. Diabetes mellitus treated with injections of non-insulin medication (HCC), improved, continue Ozempic at 0.5 mg weekly       3. Current moderate episode of major depressive disorder, unspecified whether recurrent (HCC), controlled, continue Lexapro 20 mg       4. Essential hypertension, controlled, continue losartan/HCTZ 100/12.5 Comprehensive Metabolic Panel    CBC with Auto Differential      5. Dyslipidemia with high LDL and low HDL, uncertain control, labs are pending we will see how Lipitor 40 mg and Tricor 54 mg are working Lipid Panel      6. Opioid dependence with current use ([facility]), controlled, continue pain management follow-up       7. Chronic obstructive pulmonary disease, unspecified COPD type (HCC), asymptomatic, continue pulmonology follow       8. History of lung cancer, asymptomatic, continue pulmonology follow-up       9. Aortic valve stenosis, etiology of cardiac valve disease unspecified, asymptomatic, continue cardiology follow-up       10. Coronary artery disease due to calcified coronary lesion, asymptomatic, continue cardiology follow-up       11. Gastroesophageal reflux disease, unspecified whether esophagitis present, controlled, continue Protonix       12. Recurrent HSV (herpes simplex virus), persisting, continue Valtrex as needed for outbreaks valACYclovir (VALTREX) 1 g tablet           Plan:  1)  Medication: continue current medication regimen unchanged, medications are

## 2025-02-13 ENCOUNTER — TELEPHONE (OUTPATIENT)
Dept: ORTHOPEDIC SURGERY | Age: 85
End: 2025-02-13

## 2025-02-13 NOTE — TELEPHONE ENCOUNTER
PT STATES SOMEONE FROM [facility] OFFICE CALLED HER. PT CAN BE REACHED [phone]

## 2025-02-13 NOTE — TELEPHONE ENCOUNTER
S/W the patient regarding her message. All questions were answered. She may call back with any questions or concerns.

## 2025-02-17 ENCOUNTER — OFFICE VISIT (OUTPATIENT)
Dept: ORTHOPEDIC SURGERY | Age: 85
End: 2025-02-17
Payer: MEDICARE

## 2025-02-17 VITALS — BODY MASS INDEX: 33.06 KG/M2 | HEIGHT: 59 IN | WEIGHT: 164 LBS

## 2025-02-17 DIAGNOSIS — M51.360 DEGENERATION OF INTERVERTEBRAL DISC OF LUMBAR REGION WITH DISCOGENIC BACK PAIN: ICD-10-CM

## 2025-02-17 DIAGNOSIS — G89.4 CHRONIC PAIN SYNDROME: ICD-10-CM

## 2025-02-17 DIAGNOSIS — M54.16 LUMBAR RADICULITIS: ICD-10-CM

## 2025-02-17 PROCEDURE — 1036F TOBACCO NON-USER: CPT | Performed by: PHYSICIAN ASSISTANT

## 2025-02-17 PROCEDURE — 99213 OFFICE O/P EST LOW 20 MIN: CPT | Performed by: PHYSICIAN ASSISTANT

## 2025-02-17 PROCEDURE — 1123F ACP DISCUSS/DSCN MKR DOCD: CPT | Performed by: PHYSICIAN ASSISTANT

## 2025-02-17 PROCEDURE — G8417 CALC BMI ABV UP PARAM F/U: HCPCS | Performed by: PHYSICIAN ASSISTANT

## 2025-02-17 PROCEDURE — 1090F PRES/ABSN URINE INCON ASSESS: CPT | Performed by: PHYSICIAN ASSISTANT

## 2025-02-17 PROCEDURE — G8400 PT W/DXA NO RESULTS DOC: HCPCS | Performed by: PHYSICIAN ASSISTANT

## 2025-02-17 PROCEDURE — 1125F AMNT PAIN NOTED PAIN PRSNT: CPT | Performed by: PHYSICIAN ASSISTANT

## 2025-02-17 PROCEDURE — G8427 DOCREV CUR MEDS BY ELIG CLIN: HCPCS | Performed by: PHYSICIAN ASSISTANT

## 2025-02-17 PROCEDURE — 1159F MED LIST DOCD IN RCRD: CPT | Performed by: PHYSICIAN ASSISTANT

## 2025-02-17 RX ORDER — HYDROCODONE BITARTRATE AND ACETAMINOPHEN 5; 325 MG/1; MG/1
1 TABLET ORAL DAILY PRN
Qty: 30 TABLET | Refills: 0 | Status: SHIPPED | OUTPATIENT
Start: 2025-04-15 | End: 2025-05-15

## 2025-02-17 RX ORDER — HYDROCODONE BITARTRATE AND ACETAMINOPHEN 5; 325 MG/1; MG/1
1 TABLET ORAL DAILY PRN
Qty: 30 TABLET | Refills: 0 | Status: SHIPPED | OUTPATIENT
Start: 2025-03-17 | End: 2025-04-16

## 2025-02-17 RX ORDER — HYDROCODONE BITARTRATE AND ACETAMINOPHEN 5; 325 MG/1; MG/1
1 TABLET ORAL DAILY PRN
Qty: 30 TABLET | Refills: 0 | Status: SHIPPED | OUTPATIENT
Start: 2025-02-17 | End: 2025-03-19

## 2025-02-17 NOTE — PROGRESS NOTES
SPINE: Follow Up     CHIEF COMPLAINT: Chronic low back pain, follow-up    HISTORY OF PRESENT ILLNESS:                The patient is a 84 y.o. female well known to me, here for chronic back pain and medication maintenance. She reports chronic aching low back pain radiating into the right LE. Pain will radiate into the right lateral thigh/calf to the foot with tingling.  Her low back pain is most bothersome.  Her pain is increased with prolonged activity, bending, lifting.  She did have a recent aggravation while shoveling snow the pain is back to chronic baseline at this time.  She reports pain after prolonged house hold activities and as the day progresses. She still reports significant relief with Norco 5mg qd PRN, rest and heat.  She is s/p left lower lobectomy with Dr. Cox for lung cancer--still following. Her low back pain is well managed with Norco 5/325 I po qd PRN without side effects. Medication does allow her to be more functional--able to perform ADLs independently, travel.  She has a sister that lives in [place] that she visits. She is typically able to sleep >6hr/night. She is able to care for her 9 cats.  She denies any progressive numbness tingling or LE weakness.  No recent fevers chills or infections.  Pain overall chronic and stable at this current time.     Current/Past Treatment:   Physical Therapy: YES  Chiropractic: no  Injection: Multiple prior injections & procedures: ESIs, RFN: Dr. Pantoja, R IA hip inj--Dr. Park  Medications: Norco 5mg I po qd PRN, prior oral steroids, NSAIDs, post-op percocet (Dr. Trejo)    Surgery/Consult: h/o lumbar decompression, Dr. Colvin      Function-Does the pain medication improve your ability to do:   Personal care: Yes  Housework: Yes   Physical activity: Yes  Social activity: Yes     Pain Scale: 1-10  With Meds:  0/10  Pain Scale: 1-10 Without Meds: 8-9/10     Potential aberrant drug-related behavior:  Aberrant behavior identified? NO  Potential

## 2025-04-03 ENCOUNTER — TELEPHONE (OUTPATIENT)
Dept: ORTHOPEDIC SURGERY | Age: 85
End: 2025-04-03

## 2025-04-03 NOTE — TELEPHONE ENCOUNTER
[facility] calling, patient is there to fill 3/17 script but it is , they are asking that a new order be put in for [place] 5-325    [facility] [phone]

## 2025-04-03 NOTE — TELEPHONE ENCOUNTER
S/W the pharmacist at [facility] regarding the message for the patient. Patient did not  script on 3/17, patient takes this medication PRN and did not  her refill in time. Pharmacist needed to have date changed on her medication to be filled for today. This has been completed and the patient can  their refill at their convenience. The pharmacy or patient may call back with any other questions or concerns.

## 2025-04-17 ENCOUNTER — TELEPHONE (OUTPATIENT)
Dept: ORTHOPEDIC SURGERY | Age: 85
End: 2025-04-17

## 2025-04-17 DIAGNOSIS — M54.16 LUMBAR RADICULITIS: ICD-10-CM

## 2025-04-17 DIAGNOSIS — G89.4 CHRONIC PAIN SYNDROME: Primary | ICD-10-CM

## 2025-04-17 DIAGNOSIS — M51.360 DEGENERATION OF INTERVERTEBRAL DISC OF LUMBAR REGION WITH DISCOGENIC BACK PAIN: ICD-10-CM

## 2025-04-17 DIAGNOSIS — M51.362 DEGENERATION OF INTERVERTEBRAL DISC OF LUMBAR REGION WITH DISCOGENIC BACK PAIN AND LOWER EXTREMITY PAIN: ICD-10-CM

## 2025-04-17 NOTE — TELEPHONE ENCOUNTER
Patient calling to speak with clinic in regards to increased pain    Would like a call back to discuss     [phone]

## 2025-04-17 NOTE — TELEPHONE ENCOUNTER
S/W the patient informing her I was returning her call regarding worsening LBP and radicular symptoms. She was requesting recommendations. I informed her that we could try and get her in sooner for re-evaluation of her symptoms. It was discussed to potentially get updated XR/MRI since last MRI was in 2021 and XR was completed in 2015. She states she had seen Dr. Park regarding left hip & right knee pain. She states she had imaging completed with them and Dr. Park states everything looked good and she was informed to see Dr. Knight for evaluation of spine issues. She declined at that time. She wishes to follow up with Jolie on Monday 5/5/2025 at 2PM at our [place] office to discuss her treatment options. She was recommended to go to the ED or after hours clinic with [facility] at the [place] office for further evaluation to R/O any other issues. She voiced understanding.

## 2025-04-21 ENCOUNTER — OFFICE VISIT (OUTPATIENT)
Dept: ORTHOPEDIC SURGERY | Age: 85
End: 2025-04-21
Payer: MEDICARE

## 2025-04-21 VITALS — WEIGHT: 164 LBS | HEIGHT: 59 IN | BODY MASS INDEX: 33.06 KG/M2

## 2025-04-21 DIAGNOSIS — G89.4 CHRONIC PAIN SYNDROME: ICD-10-CM

## 2025-04-21 DIAGNOSIS — M54.16 LUMBAR RADICULITIS: ICD-10-CM

## 2025-04-21 DIAGNOSIS — M51.362 DEGENERATION OF INTERVERTEBRAL DISC OF LUMBAR REGION WITH DISCOGENIC BACK PAIN AND LOWER EXTREMITY PAIN: Primary | ICD-10-CM

## 2025-04-21 PROCEDURE — 99214 OFFICE O/P EST MOD 30 MIN: CPT | Performed by: PHYSICIAN ASSISTANT

## 2025-04-21 PROCEDURE — G8400 PT W/DXA NO RESULTS DOC: HCPCS | Performed by: PHYSICIAN ASSISTANT

## 2025-04-21 PROCEDURE — G8427 DOCREV CUR MEDS BY ELIG CLIN: HCPCS | Performed by: PHYSICIAN ASSISTANT

## 2025-04-21 PROCEDURE — 1036F TOBACCO NON-USER: CPT | Performed by: PHYSICIAN ASSISTANT

## 2025-04-21 PROCEDURE — 1125F AMNT PAIN NOTED PAIN PRSNT: CPT | Performed by: PHYSICIAN ASSISTANT

## 2025-04-21 PROCEDURE — 1159F MED LIST DOCD IN RCRD: CPT | Performed by: PHYSICIAN ASSISTANT

## 2025-04-21 PROCEDURE — 1090F PRES/ABSN URINE INCON ASSESS: CPT | Performed by: PHYSICIAN ASSISTANT

## 2025-04-21 PROCEDURE — G8417 CALC BMI ABV UP PARAM F/U: HCPCS | Performed by: PHYSICIAN ASSISTANT

## 2025-04-21 PROCEDURE — 1123F ACP DISCUSS/DSCN MKR DOCD: CPT | Performed by: PHYSICIAN ASSISTANT

## 2025-04-21 RX ORDER — METHYLPREDNISOLONE 4 MG/1
TABLET ORAL
Qty: 1 KIT | Refills: 0 | Status: SHIPPED | OUTPATIENT
Start: 2025-04-21

## 2025-04-21 RX ORDER — HYDROCODONE BITARTRATE AND ACETAMINOPHEN 5; 325 MG/1; MG/1
1 TABLET ORAL DAILY PRN
Qty: 30 TABLET | Refills: 0 | Status: SHIPPED | OUTPATIENT
Start: 2025-05-01 | End: 2025-05-31

## 2025-04-21 RX ORDER — HYDROCODONE BITARTRATE AND ACETAMINOPHEN 5; 325 MG/1; MG/1
1 TABLET ORAL DAILY PRN
Qty: 30 TABLET | Refills: 0 | Status: SHIPPED | OUTPATIENT
Start: 2025-05-30 | End: 2025-06-29

## 2025-04-21 RX ORDER — HYDROCODONE BITARTRATE AND ACETAMINOPHEN 5; 325 MG/1; MG/1
1 TABLET ORAL DAILY PRN
Qty: 30 TABLET | Refills: 0 | Status: SHIPPED | OUTPATIENT
Start: 2025-06-28 | End: 2025-07-28

## 2025-04-21 NOTE — PROGRESS NOTES
opioid use. Utilization of adjuvant medications as appropriate.     Risks and benefits of the medications and alternative treatments have been discussed with the patient. The patient was advised against drinking alcohol with the opioid pain medicines, advised against driving or handling machinery when starting or adjusting the dose of medicines, feeling groggy or drowsy, or if having any cognitive issues related to the current medications. The patient is fully aware of the risk of potential addiction, overdose and death, if medicines are misused and not taken as prescribed.  Planned duration of treatment until patient is able to be functional with less pain. Patient will follow up every 3 months or sooner, if needed. Discussed patient's responsibility to safely store and appropriately dispose up medication.  Prescription for Narcan offered. If the patient develops new symptoms or if the symptoms worsen, the patient was told to call the office.  Controlled Substance Monitoring:    Acute and Chronic Pain Monitoring:   RX Monitoring Periodic Controlled Substance Monitoring Chronic Pain > 50 MEDD Chronic Pain > 80 MEDD   4/21/2025   9:08 AM Possible medication side effects, risk of tolerance/dependence & alternative treatments discussed.;No signs of potential drug abuse or diversion identified.;Assessed functional status (ability to engage in work or other purposeful activities, the pain intensity and its interference with activities of daily living, quality of family life and social activities, and the physical activity);Obtaining appropriate analgesic effect of treatment. Re-evaluated the status of the patient's underlying condition causing pain.;Obtained or confirmed \"Consent for Opioid Use\" on file. Naloxone script offered, but declined by patient.           Jolie Steen PA-C, MPAS  Board Certified by the NCCPA

## 2025-04-22 ENCOUNTER — HOSPITAL ENCOUNTER (OUTPATIENT)
Dept: MRI IMAGING | Age: 85
Discharge: HOME | End: 2025-04-22
Payer: MEDICARE

## 2025-04-22 DIAGNOSIS — M54.16 LUMBAR RADICULITIS: ICD-10-CM

## 2025-04-22 DIAGNOSIS — G89.4 CHRONIC PAIN SYNDROME: ICD-10-CM

## 2025-04-22 LAB
BUN SERPL-MCNC: 19 MG/DL (ref 7–20)
CREAT SERPL-MCNC: 0.6 MG/DL (ref 0.6–1.2)
GFR SERPLBLD CREATININE-BSD FMLA CKD-EPI: 89 ML/MIN/{1.73_M2}

## 2025-04-22 PROCEDURE — A9579 GAD-BASE MR CONTRAST NOS,1ML: HCPCS | Performed by: PHYSICIAN ASSISTANT

## 2025-04-22 PROCEDURE — 72158 MRI LUMBAR SPINE W/O & W/DYE: CPT

## 2025-04-22 PROCEDURE — 6360000004 HC RX CONTRAST MEDICATION: Performed by: PHYSICIAN ASSISTANT

## 2025-04-22 RX ADMIN — GADOTERIDOL 14 ML: 279.3 INJECTION, SOLUTION INTRAVENOUS at 14:17

## 2025-04-22 NOTE — PROGRESS NOTES
Pt has earring that cannot be removed.  Non ferromagnetic  Advised patient of risks and squeeze ball protocol

## 2025-05-01 ENCOUNTER — TELEPHONE (OUTPATIENT)
Dept: FAMILY MEDICINE CLINIC | Age: 85
End: 2025-05-01

## 2025-05-05 ENCOUNTER — HOSPITAL ENCOUNTER (OUTPATIENT)
Dept: CT IMAGING | Age: 85
Discharge: HOME | End: 2025-05-05
Payer: MEDICARE

## 2025-05-05 DIAGNOSIS — C34.32 PRIMARY MALIGNANT NEOPLASM OF BRONCHUS OF LEFT LOWER LOBE (HCC): ICD-10-CM

## 2025-05-05 PROCEDURE — 71250 CT THORAX DX C-: CPT

## 2025-05-08 NOTE — PROGRESS NOTES
CARDIOLOGY OFFICE NOTE      Patient Name:  Marianela Quintana  Requesting Physician: No admitting provider for patient encounter.  Primary Care Physician: Kailee Fields MD      Reason for Consultation/Chief Complaint: Coronary artery calcification, aortic stenosis  History of Present Illness:  Marianela Quintana is a 85 y.o. female with a medical history notable for mild aortic valve stenosis, mild AR, hypertension, coronary artery calcification per CT, Type 2 Diabetes mellitus, non-small cell lung cancer status post left lower lobe lobectomy, obesity and COPD.     Preoperative stress test 3/18/22 revealing normal perfusion with no inducible ischemia/scar. 4/21/23 she underwent a Left Lower Lobe wedge resection and left lower lobectomy with mediastinal lymphadenectomy for non-small cell lung cancer.     LOV 4/19/24 at which time she was doing well with no cardiac symptoms.    Today, she reports feeling well.  She admits she is quite sedentary.  She hates to exercise.  She has no issues going to the grocery store, shopping, etc.  Patient denies chest pain, shortness of breath, palpitations.  States her balance is off.  She went to vestibular rehab but did not find this helpful.  States has to hold on to things when she first gets up.  Has not had dizziness or loss of consciousness. The patient is compliant with medications.  Cost of medications is affordable.  No endorsed side effects.  Has upcoming appointment with pulmonary, Dr. Oliver.        Past Medical History:   has a past medical history of Anxiety, Arthritis, Crohn's disease (HCC), Fracture, foot, Fracture, wrist, [place] (hard of hearing), Hyperlipidemia, Hypertension, Macular degeneration, Macular degeneration, Murmur, Non-small cell lung cancer, unspecified laterality (HCC), Obesity, Osteoarthritis, Rheumatic fever, Type 2 diabetes mellitus without complication (HCC), and Wears dentures.    Surgical History:   has a past surgical history that includes Tonsillectomy

## 2025-05-11 DIAGNOSIS — G89.4 CHRONIC PAIN SYNDROME: ICD-10-CM

## 2025-05-11 DIAGNOSIS — M54.16 LUMBAR RADICULITIS: ICD-10-CM

## 2025-05-12 ENCOUNTER — OFFICE VISIT (OUTPATIENT)
Dept: CARDIOLOGY CLINIC | Age: 85
End: 2025-05-12
Payer: MEDICARE

## 2025-05-12 VITALS
SYSTOLIC BLOOD PRESSURE: 130 MMHG | DIASTOLIC BLOOD PRESSURE: 70 MMHG | OXYGEN SATURATION: 94 % | HEIGHT: 59 IN | BODY MASS INDEX: 33.26 KG/M2 | HEART RATE: 83 BPM | WEIGHT: 165 LBS

## 2025-05-12 DIAGNOSIS — I10 ESSENTIAL HYPERTENSION: Chronic | ICD-10-CM

## 2025-05-12 DIAGNOSIS — I25.10 CORONARY ARTERY CALCIFICATION SEEN ON CAT SCAN: ICD-10-CM

## 2025-05-12 DIAGNOSIS — R01.1 MURMUR: ICD-10-CM

## 2025-05-12 DIAGNOSIS — I35.0 NONRHEUMATIC AORTIC VALVE STENOSIS: Primary | ICD-10-CM

## 2025-05-12 PROCEDURE — G2211 COMPLEX E/M VISIT ADD ON: HCPCS | Performed by: INTERNAL MEDICINE

## 2025-05-12 PROCEDURE — 1123F ACP DISCUSS/DSCN MKR DOCD: CPT | Performed by: INTERNAL MEDICINE

## 2025-05-12 PROCEDURE — 93000 ELECTROCARDIOGRAM COMPLETE: CPT | Performed by: INTERNAL MEDICINE

## 2025-05-12 PROCEDURE — 1159F MED LIST DOCD IN RCRD: CPT | Performed by: INTERNAL MEDICINE

## 2025-05-12 PROCEDURE — 1036F TOBACCO NON-USER: CPT | Performed by: INTERNAL MEDICINE

## 2025-05-12 PROCEDURE — 3078F DIAST BP <80 MM HG: CPT | Performed by: INTERNAL MEDICINE

## 2025-05-12 PROCEDURE — G8417 CALC BMI ABV UP PARAM F/U: HCPCS | Performed by: INTERNAL MEDICINE

## 2025-05-12 PROCEDURE — G8400 PT W/DXA NO RESULTS DOC: HCPCS | Performed by: INTERNAL MEDICINE

## 2025-05-12 PROCEDURE — 1090F PRES/ABSN URINE INCON ASSESS: CPT | Performed by: INTERNAL MEDICINE

## 2025-05-12 PROCEDURE — G8427 DOCREV CUR MEDS BY ELIG CLIN: HCPCS | Performed by: INTERNAL MEDICINE

## 2025-05-12 PROCEDURE — 99214 OFFICE O/P EST MOD 30 MIN: CPT | Performed by: INTERNAL MEDICINE

## 2025-05-12 PROCEDURE — 3075F SYST BP GE 130 - 139MM HG: CPT | Performed by: INTERNAL MEDICINE

## 2025-05-12 NOTE — PATIENT INSTRUCTIONS
Echocardiogram to view size and strength of the heart and valves    To schedule outpatient testing, contact Central Scheduling by calling [facility] ([phone]).   We will call you with the results     2.    No change in medications today

## 2025-05-13 ENCOUNTER — TELEMEDICINE (OUTPATIENT)
Dept: FAMILY MEDICINE CLINIC | Age: 85
End: 2025-05-13

## 2025-05-13 DIAGNOSIS — Z00.00 MEDICARE ANNUAL WELLNESS VISIT, SUBSEQUENT: Primary | ICD-10-CM

## 2025-05-13 ASSESSMENT — PATIENT HEALTH QUESTIONNAIRE - PHQ9
9. THOUGHTS THAT YOU WOULD BE BETTER OFF DEAD, OR OF HURTING YOURSELF: NOT AT ALL
SUM OF ALL RESPONSES TO PHQ QUESTIONS 1-9: 7
6. FEELING BAD ABOUT YOURSELF - OR THAT YOU ARE A FAILURE OR HAVE LET YOURSELF OR YOUR FAMILY DOWN: NOT AT ALL
5. POOR APPETITE OR OVEREATING: NOT AT ALL
SUM OF ALL RESPONSES TO PHQ QUESTIONS 1-9: 7
3. TROUBLE FALLING OR STAYING ASLEEP: SEVERAL DAYS
8. MOVING OR SPEAKING SO SLOWLY THAT OTHER PEOPLE COULD HAVE NOTICED. OR THE OPPOSITE, BEING SO FIGETY OR RESTLESS THAT YOU HAVE BEEN MOVING AROUND A LOT MORE THAN USUAL: NOT AT ALL
4. FEELING TIRED OR HAVING LITTLE ENERGY: NEARLY EVERY DAY
1. LITTLE INTEREST OR PLEASURE IN DOING THINGS: NOT AT ALL
7. TROUBLE CONCENTRATING ON THINGS, SUCH AS READING THE NEWSPAPER OR WATCHING TELEVISION: NOT AT ALL
10. IF YOU CHECKED OFF ANY PROBLEMS, HOW DIFFICULT HAVE THESE PROBLEMS MADE IT FOR YOU TO DO YOUR WORK, TAKE CARE OF THINGS AT HOME, OR GET ALONG WITH OTHER PEOPLE: SOMEWHAT DIFFICULT
SUM OF ALL RESPONSES TO PHQ QUESTIONS 1-9: 7
SUM OF ALL RESPONSES TO PHQ QUESTIONS 1-9: 7
2. FEELING DOWN, DEPRESSED OR HOPELESS: NEARLY EVERY DAY

## 2025-05-13 ASSESSMENT — LIFESTYLE VARIABLES
HOW OFTEN DO YOU HAVE A DRINK CONTAINING ALCOHOL: 2-4 TIMES A MONTH
HOW MANY STANDARD DRINKS CONTAINING ALCOHOL DO YOU HAVE ON A TYPICAL DAY: 1 OR 2

## 2025-05-13 NOTE — PROGRESS NOTES
Medicare Annual Wellness Visit    Marianela Quintana is here for Medicare AWV    Assessment & Plan   Medicare annual wellness visit, subsequent       No follow-ups on file.     Subjective       Patient's complete Health Risk Assessment and screening values have been reviewed and are found in Flowsheets. The following problems were reviewed today and where indicated follow up appointments were made and/or referrals ordered.    Positive Risk Factor Screenings with Interventions:    Fall Risk:  Do you feel unsteady or are you worried about falling? : (!) yes  2 or more falls in past year?: (!) yes  Fall with injury in past year?: no     Interventions:    Reviewed medications, home hazards, visual acuity, and co-morbidities that can increase risk for falls  See AVS for additional education material     Depression:  PHQ-2 Score: 3  PHQ-9 Total Score: 7  Total Score Interpretation: 5-9 = mild depression    Interventions:  Taking lexapro but does not feel is working- declines counseling but will discuss options with pcp at upcoming office visit  next week       Controlled Medication Review:    Today's Pain Level: No data recorded   Opioid Risk: (Low risk score <55) Opioid risk score: 27    Patient is low risk for opioid use disorder or overdose.    Last PDMP [place] as Reviewed:  Review User Review Instant Review Result   GIANNA SALAZAR 4/21/2025  9:08 AM     Reviewed PDMP [1]     Last Controlled Substance Monitoring Documentation      Flowsheet Row Office Visit from 4/21/2025 in [facility]   Periodic Controlled Substance Monitoring Possible medication side effects, risk of tolerance/dependence & alternative treatments discussed., No signs of potential drug abuse or diversion identified., Assessed functional status (ability to engage in work or other purposeful activities, the pain intensity and its interference with activities of daily living, quality of family life

## 2025-05-15 NOTE — PROGRESS NOTES
PULMONARY CLINIC NOTE      Marianela Quintana   : 1940  MRN: [id number]     Date of Service: 2025    PCP: Kailee Fields MD    Referring provider: No ref. provider found      Chief Complaint   Patient presents with    Follow-up     1 year           ASSESSMENT & PLAN       85 y.o. krysta  female patient with:    # Left lower lobe NSCLC. S/P L VATS with left lower lobectomy on 2023 by Dr. Marrero  No evidence of cancer recurrence on surveillance imaging.  # Persistent chest pain at the site of VATS surgical ports  # Mild COPD.  # Nicotine dependence history. 75 PY. Quit date:   # Insomnia with hypersomnolence. Negative previously study.  # Metabolic syndrome: HTN, HLD, DM, body habitus, hepatic steatosis/hepatomegaly  # Chronic sinusitis with chronic runny nose  Reviewed CAT scan findings with the patient and reassured her.  Next CT scan in 12 months.  Ipratropium nasal spray to see if that would help with persistent runny nose.  If that is not helping we will consider azelastine nasal spray  Follow-up in 12 months    I spent total of 20 minutes on this encounter on physical exam, chart review, interpreting labs and images, coordinating care, medical documentation and counseling the patient on diagnosis listed above.     Subjective/Objective     Interval History: Encounter 2025  []Patient completed surveillance CT scan on May 5, 2025 that showed stable postsurgical changes from previous left lower lobectomy with no evidence of intrathoracic recurrence of lung cancer.  Hepatic steatosis also noticed.  Patient continues to have runny nose all the time    HPI (Encounter: May 9, 2024):   84-year-old krysta female patient with PMH of DM2, HTN, HLD, chron's disease, NSCLC, COPD, rheumatoid arthritis, previously following with Dr. oCx who comes in for follow-up.  Patient was last seen in 2023.  Patient had enlarging left lower lobe pulmonary nodule that was FDG avid.  Patient was

## 2025-05-19 ENCOUNTER — OFFICE VISIT (OUTPATIENT)
Dept: ORTHOPEDIC SURGERY | Age: 85
End: 2025-05-19
Payer: MEDICARE

## 2025-05-19 ENCOUNTER — TRANSCRIBE ORDERS (OUTPATIENT)
Dept: ADMINISTRATIVE | Age: 85
End: 2025-05-19

## 2025-05-19 VITALS — WEIGHT: 165 LBS | BODY MASS INDEX: 33.26 KG/M2 | HEIGHT: 59 IN

## 2025-05-19 DIAGNOSIS — M54.16 LUMBAR RADICULITIS: ICD-10-CM

## 2025-05-19 DIAGNOSIS — M51.362 DEGENERATION OF INTERVERTEBRAL DISC OF LUMBAR REGION WITH DISCOGENIC BACK PAIN AND LOWER EXTREMITY PAIN: ICD-10-CM

## 2025-05-19 DIAGNOSIS — G89.4 CHRONIC PAIN SYNDROME: Primary | ICD-10-CM

## 2025-05-19 DIAGNOSIS — C34.32 PRIMARY MALIGNANT NEOPLASM OF BRONCHUS OF LEFT LOWER LOBE (HCC): Primary | ICD-10-CM

## 2025-05-19 PROCEDURE — G8417 CALC BMI ABV UP PARAM F/U: HCPCS | Performed by: PHYSICIAN ASSISTANT

## 2025-05-19 PROCEDURE — 1159F MED LIST DOCD IN RCRD: CPT | Performed by: PHYSICIAN ASSISTANT

## 2025-05-19 PROCEDURE — G8400 PT W/DXA NO RESULTS DOC: HCPCS | Performed by: PHYSICIAN ASSISTANT

## 2025-05-19 PROCEDURE — G8427 DOCREV CUR MEDS BY ELIG CLIN: HCPCS | Performed by: PHYSICIAN ASSISTANT

## 2025-05-19 PROCEDURE — 1125F AMNT PAIN NOTED PAIN PRSNT: CPT | Performed by: PHYSICIAN ASSISTANT

## 2025-05-19 PROCEDURE — 99214 OFFICE O/P EST MOD 30 MIN: CPT | Performed by: PHYSICIAN ASSISTANT

## 2025-05-19 PROCEDURE — 1090F PRES/ABSN URINE INCON ASSESS: CPT | Performed by: PHYSICIAN ASSISTANT

## 2025-05-19 PROCEDURE — 1036F TOBACCO NON-USER: CPT | Performed by: PHYSICIAN ASSISTANT

## 2025-05-19 PROCEDURE — 1123F ACP DISCUSS/DSCN MKR DOCD: CPT | Performed by: PHYSICIAN ASSISTANT

## 2025-05-19 RX ORDER — LIDOCAINE 50 MG/G
PATCH TOPICAL
Qty: 30 PATCH | Refills: 0 | Status: SHIPPED | OUTPATIENT
Start: 2025-05-19

## 2025-05-19 RX ORDER — METHYLPREDNISOLONE 4 MG/1
TABLET ORAL
Qty: 21 TABLET | OUTPATIENT
Start: 2025-05-19

## 2025-05-19 NOTE — PROGRESS NOTES
SPINE: Follow Up     CHIEF COMPLAINT: Chronic worsening low back pain, follow-up MRI    HISTORY OF PRESENT ILLNESS:                The patient is a 85 y.o. female s/p remote lumbar decompression (Dr. Colvin), here to review updated lumbar MRI for chronic worsening back pain and leg pain. She reports chronic aching (LEFT) low back/buttock pain radiating into the right LE (lateral aspect).  She states over the last several weeks her symptoms have been worsening predominantly in the low back and left buttock region.  She did recently have a fall landing on her right knee.  Pain will still radiate into the right lateral thigh/calf to the foot with tingling.  Her low back pain is most bothersome.  Her pain is increased with prolonged activity--walking or standing, bending, lifting.  She reports pain after prolonged house hold activities and as the day progresses. She still reports significant relief with Norco 5mg qd PRN, rest and heat. MDP did provide good but temporary relief of recent flareup.  She is s/p left lower lobectomy with Dr. Cox for lung cancer--still following. Her low back pain is typically well managed with Norco 5/325 I po qd PRN (chronic) without side effects. Medication does allow her to be more functional--able to perform ADLs independently, travel.  She has a sister that lives in [place] that she visits. She is typically able to sleep >6hr/night. She is able to care for her 9 cats.  She denies any progressive numbness tingling or progressive LE weakness.  No recent fevers chills or infections.     Current/Past Treatment:   Physical Therapy: YES  Chiropractic: no  Injection: Multiple prior injections & procedures: ESIs, RFN: Dr. Pantoja, R IA hip inj--Dr. Park  Medications: Norco 5mg I po qd PRN int with Dr. Knight, prior oral steroids, NSAIDs, post-op percocet (Dr. Trejo) , MDP 4/2025  Surgery/Consult: h/o lumbar decompression, Dr. Colvin      Function-Does the pain medication improve

## 2025-05-20 ENCOUNTER — OFFICE VISIT (OUTPATIENT)
Dept: PULMONOLOGY | Age: 85
End: 2025-05-20
Payer: MEDICARE

## 2025-05-20 VITALS
HEIGHT: 59 IN | SYSTOLIC BLOOD PRESSURE: 140 MMHG | TEMPERATURE: 97.9 F | WEIGHT: 166.2 LBS | DIASTOLIC BLOOD PRESSURE: 69 MMHG | OXYGEN SATURATION: 97 % | HEART RATE: 84 BPM | BODY MASS INDEX: 33.51 KG/M2 | RESPIRATION RATE: 16 BRPM

## 2025-05-20 DIAGNOSIS — J44.9 CHRONIC OBSTRUCTIVE PULMONARY DISEASE, UNSPECIFIED COPD TYPE (HCC): Primary | ICD-10-CM

## 2025-05-20 DIAGNOSIS — Z85.118 HISTORY OF LUNG CANCER: ICD-10-CM

## 2025-05-20 DIAGNOSIS — Z87.891 SMOKING HISTORY: ICD-10-CM

## 2025-05-20 PROCEDURE — 1159F MED LIST DOCD IN RCRD: CPT | Performed by: INTERNAL MEDICINE

## 2025-05-20 PROCEDURE — 99214 OFFICE O/P EST MOD 30 MIN: CPT | Performed by: INTERNAL MEDICINE

## 2025-05-20 PROCEDURE — G8427 DOCREV CUR MEDS BY ELIG CLIN: HCPCS | Performed by: INTERNAL MEDICINE

## 2025-05-20 PROCEDURE — G8400 PT W/DXA NO RESULTS DOC: HCPCS | Performed by: INTERNAL MEDICINE

## 2025-05-20 PROCEDURE — 3077F SYST BP >= 140 MM HG: CPT | Performed by: INTERNAL MEDICINE

## 2025-05-20 PROCEDURE — G2211 COMPLEX E/M VISIT ADD ON: HCPCS | Performed by: INTERNAL MEDICINE

## 2025-05-20 PROCEDURE — 1123F ACP DISCUSS/DSCN MKR DOCD: CPT | Performed by: INTERNAL MEDICINE

## 2025-05-20 PROCEDURE — 3023F SPIROM DOC REV: CPT | Performed by: INTERNAL MEDICINE

## 2025-05-20 PROCEDURE — 1036F TOBACCO NON-USER: CPT | Performed by: INTERNAL MEDICINE

## 2025-05-20 PROCEDURE — G8417 CALC BMI ABV UP PARAM F/U: HCPCS | Performed by: INTERNAL MEDICINE

## 2025-05-20 PROCEDURE — 3078F DIAST BP <80 MM HG: CPT | Performed by: INTERNAL MEDICINE

## 2025-05-20 PROCEDURE — 1090F PRES/ABSN URINE INCON ASSESS: CPT | Performed by: INTERNAL MEDICINE

## 2025-05-20 RX ORDER — IPRATROPIUM BROMIDE 42 UG/1
2 SPRAY, METERED NASAL 4 TIMES DAILY
Qty: 1 EACH | Refills: 3 | Status: SHIPPED | OUTPATIENT
Start: 2025-05-20

## 2025-05-20 NOTE — PROGRESS NOTES
MA Communication:  The following orders are received by verbal communication from Kristen Oliver MD    Orders include:    1 year  Patient will schedule CT with oncology

## 2025-05-22 ENCOUNTER — RESULTS FOLLOW-UP (OUTPATIENT)
Dept: ORTHOPEDIC SURGERY | Age: 85
End: 2025-05-22

## 2025-05-22 NOTE — TELEPHONE ENCOUNTER
S/W the patient informing her I was calling to let her know that Jolie Steen PA-C got the addendum on the patient's L-Spine MRI and states \"per radiologist these are simple renal cysts and require no further f/u. She can discuss with PCP if she would like.\" She voiced understanding and states she will reach out to her PCP at this time to discuss. I voiced understanding. She may call back with any questions or concerns.

## 2025-05-22 NOTE — TELEPHONE ENCOUNTER
----- Message from Jolie Steen PA-C sent at 5/22/2025  9:08 AM EDT -----  Please let her know per radiologist these are simple renal cysts and require no further f/u. She can discuss with PCP if she would like.    ADDENDUM:  There are multiple simple left renal cysts partially visualized on this  nondedicated study and the largest measures 1.9 x 1.7 cm.  There may also be  a few punctate simple right renal cysts partially visualized.  There is no  abnormal enhancement and these are of no clinical significance.  ----- Message -----  From: Ramesh Brooks Incoming Radiology Results From [employer]  Sent: 4/25/2025  10:13 AM EDT  To: Jolie Steen PA-C

## 2025-05-27 ENCOUNTER — OFFICE VISIT (OUTPATIENT)
Dept: FAMILY MEDICINE CLINIC | Age: 85
End: 2025-05-27

## 2025-05-27 VITALS
OXYGEN SATURATION: 97 % | BODY MASS INDEX: 33.3 KG/M2 | HEART RATE: 76 BPM | WEIGHT: 165.2 LBS | HEIGHT: 59 IN | SYSTOLIC BLOOD PRESSURE: 130 MMHG | DIASTOLIC BLOOD PRESSURE: 64 MMHG

## 2025-05-27 DIAGNOSIS — I25.10 CORONARY ARTERY DISEASE DUE TO CALCIFIED CORONARY LESION: ICD-10-CM

## 2025-05-27 DIAGNOSIS — Z85.118 HISTORY OF LUNG CANCER: ICD-10-CM

## 2025-05-27 DIAGNOSIS — F32.1 CURRENT MODERATE EPISODE OF MAJOR DEPRESSIVE DISORDER, UNSPECIFIED WHETHER RECURRENT (HCC): ICD-10-CM

## 2025-05-27 DIAGNOSIS — K21.9 GASTROESOPHAGEAL REFLUX DISEASE, UNSPECIFIED WHETHER ESOPHAGITIS PRESENT: ICD-10-CM

## 2025-05-27 DIAGNOSIS — E11.69 TYPE 2 DIABETES MELLITUS WITH OTHER SPECIFIED COMPLICATION, WITHOUT LONG-TERM CURRENT USE OF INSULIN (HCC): Primary | ICD-10-CM

## 2025-05-27 DIAGNOSIS — I25.84 CORONARY ARTERY DISEASE DUE TO CALCIFIED CORONARY LESION: ICD-10-CM

## 2025-05-27 DIAGNOSIS — E78.5 DYSLIPIDEMIA WITH HIGH LDL AND LOW HDL: ICD-10-CM

## 2025-05-27 DIAGNOSIS — I35.0 AORTIC VALVE STENOSIS, ETIOLOGY OF CARDIAC VALVE DISEASE UNSPECIFIED: ICD-10-CM

## 2025-05-27 DIAGNOSIS — Z79.85 DIABETES MELLITUS TREATED WITH INJECTIONS OF NON-INSULIN MEDICATION (HCC): ICD-10-CM

## 2025-05-27 DIAGNOSIS — I10 ESSENTIAL HYPERTENSION: ICD-10-CM

## 2025-05-27 DIAGNOSIS — E11.9 DIABETES MELLITUS TREATED WITH INJECTIONS OF NON-INSULIN MEDICATION (HCC): ICD-10-CM

## 2025-05-27 DIAGNOSIS — J44.9 CHRONIC OBSTRUCTIVE PULMONARY DISEASE, UNSPECIFIED COPD TYPE (HCC): ICD-10-CM

## 2025-05-27 DIAGNOSIS — F11.20 OPIOID DEPENDENCE WITH CURRENT USE (HCC): ICD-10-CM

## 2025-05-27 LAB — HBA1C MFR BLD: 7 %

## 2025-05-27 NOTE — PROGRESS NOTES
5-325 MG per tablet Take 1 tablet by mouth daily as needed for Pain for up to 30 days. Max one tablet a day. As needed for pain. Max Daily Amount: 1 tablet 30 tablet 0    valACYclovir (VALTREX) 1 g tablet Take 1 tablet by mouth 2 times daily 20 tablet 2    FARXIGA 10 MG tablet TAKE 1 TABLET BY MOUTH EVERY MORNING 90 tablet 1    pantoprazole (PROTONIX) 40 MG tablet Take 1 tablet by mouth every morning (before breakfast) 90 tablet 1    metFORMIN (GLUCOPHAGE) 1000 MG tablet TAKE 1 TABLET BY MOUTH TWICE A DAY WITH A MEAL 180 tablet 1    losartan-hydroCHLOROthiazide (HYZAAR) 100-12.5 MG per tablet TAKE 1 TABLET BY MOUTH DAILY 90 tablet 1    escitalopram (LEXAPRO) 20 MG tablet TAKE 1 TABLET BY MOUTH DAILY 90 tablet 1    atorvastatin (LIPITOR) 40 MG tablet TAKE ONE TABLET BY MOUTH DAILY 90 tablet 2    fenofibrate (TRICOR) 54 MG tablet Take 1 tablet by mouth daily 90 tablet 3    aspirin 81 MG EC tablet Take 1 tablet by mouth daily      Semaglutide, 1 MG/DOSE, (OZEMPIC) 4 MG/3ML SOPN sc injection Inject 1 mg into the skin every 7 days 3 mL 3    [START ON 6/28/2025] HYDROcodone-acetaminophen (NORCO) 5-325 MG per tablet Take 1 tablet by mouth daily as needed for Pain for up to 30 days. Max one tablet a day. As needed for pain. Max Daily Amount: 1 tablet 30 tablet 0    [START ON 5/30/2025] HYDROcodone-acetaminophen (NORCO) 5-325 MG per tablet Take 1 tablet by mouth daily as needed for Pain for up to 30 days. Max one tablet a day. As needed for pain. Max Daily Amount: 1 tablet 30 tablet 0     No current facility-administered medications for this visit.       Allergies   Allergen Reactions    Bee Venom     Formaldehyde Itching    Iodine Hives     IVP dye    Lisinopril      Throat swelling  Angioedema      Niaspan [Niacin Er (Antihyperlipidemic)]      facial rash and itching    Amoxicillin Rash, Hives and Itching    Diovan [Valsartan]               Social History     Tobacco Use    Smoking status: Former     Current packs/day:

## 2025-05-29 DIAGNOSIS — F32.1 CURRENT MODERATE EPISODE OF MAJOR DEPRESSIVE DISORDER, UNSPECIFIED WHETHER RECURRENT (HCC): ICD-10-CM

## 2025-05-30 RX ORDER — ESCITALOPRAM OXALATE 20 MG/1
20 TABLET ORAL DAILY
Qty: 90 TABLET | Refills: 1 | Status: SHIPPED | OUTPATIENT
Start: 2025-05-30

## 2025-05-30 NOTE — TELEPHONE ENCOUNTER
Marianela Quintana is requesting refill(s) lexapro  Last OV 5/27/25 (pertaining to medication)  LR 7/30/24 (per medication requested)  Next office visit scheduled or attempted Yes   If no, reason:  9/3/25

## 2025-06-12 ENCOUNTER — HOSPITAL ENCOUNTER (OUTPATIENT)
Dept: CARDIOLOGY | Age: 85
Discharge: HOME | End: 2025-06-14
Attending: INTERNAL MEDICINE
Payer: MEDICARE

## 2025-06-12 VITALS
WEIGHT: 166 LBS | DIASTOLIC BLOOD PRESSURE: 69 MMHG | SYSTOLIC BLOOD PRESSURE: 140 MMHG | BODY MASS INDEX: 33.47 KG/M2 | HEIGHT: 59 IN

## 2025-06-12 DIAGNOSIS — I35.0 NONRHEUMATIC AORTIC VALVE STENOSIS: ICD-10-CM

## 2025-06-12 LAB
ECHO AO ASC DIAM: 3.3 CM
ECHO AO ASCENDING AORTA INDEX: 1.94 CM/M2
ECHO AO ROOT DIAM: 3.3 CM
ECHO AO ROOT INDEX: 1.94 CM/M2
ECHO AV AREA PEAK VELOCITY: 1.2 CM2
ECHO AV AREA VTI: 1.2 CM2
ECHO AV AREA/BSA PEAK VELOCITY: 0.7 CM2/M2
ECHO AV AREA/BSA VTI: 0.7 CM2/M2
ECHO AV CUSP MM: 0.9 CM
ECHO AV MEAN GRADIENT: 18 MMHG
ECHO AV MEAN VELOCITY: 2 M/S
ECHO AV PEAK GRADIENT: 37 MMHG
ECHO AV PEAK VELOCITY: 3 M/S
ECHO AV VELOCITY RATIO: 0.33
ECHO AV VTI: 69 CM
ECHO BSA: 1.77 M2
ECHO EST RA PRESSURE: 3 MMHG
ECHO LA AREA 2C: 19.6 CM2
ECHO LA AREA 4C: 19.7 CM2
ECHO LA DIAMETER INDEX: 2.53 CM/M2
ECHO LA DIAMETER: 4.3 CM
ECHO LA MAJOR AXIS: 5.4 CM
ECHO LA MINOR AXIS: 5.5 CM
ECHO LA TO AORTIC ROOT RATIO: 1.3
ECHO LA VOL BP: 58 ML (ref 22–52)
ECHO LA VOL MOD A2C: 57 ML (ref 22–52)
ECHO LA VOL MOD A4C: 59 ML (ref 22–52)
ECHO LA VOL/BSA BIPLANE: 34 ML/M2 (ref 16–34)
ECHO LA VOLUME INDEX MOD A2C: 34 ML/M2 (ref 16–34)
ECHO LA VOLUME INDEX MOD A4C: 35 ML/M2 (ref 16–34)
ECHO LV E' LATERAL VELOCITY: 6.85 CM/S
ECHO LV E' SEPTAL VELOCITY: 8.92 CM/S
ECHO LV EDV 3D: 145 ML
ECHO LV EDV INDEX 3D: 85 ML/M2
ECHO LV EF PHYSICIAN: 60 %
ECHO LV EJECTION FRACTION 3D: 56 %
ECHO LV ESV 3D: 63 ML
ECHO LV ESV INDEX 3D: 37 ML/M2
ECHO LV FRACTIONAL SHORTENING: 41 % (ref 28–44)
ECHO LV GLOBAL LONGITUDINAL STRAIN (GLS): -15.5 %
ECHO LV GLOBAL LONGITUDINAL STRAIN (GLS): -16.5 %
ECHO LV GLOBAL LONGITUDINAL STRAIN (GLS): -17.4 %
ECHO LV GLOBAL LONGITUDINAL STRAIN (GLS): -20.3 %
ECHO LV INTERNAL DIMENSION DIASTOLE INDEX: 2.29 CM/M2
ECHO LV INTERNAL DIMENSION DIASTOLIC: 3.9 CM (ref 3.9–5.3)
ECHO LV INTERNAL DIMENSION SYSTOLIC INDEX: 1.35 CM/M2
ECHO LV INTERNAL DIMENSION SYSTOLIC: 2.3 CM
ECHO LV ISOVOLUMETRIC RELAXATION TIME (IVRT): 71 MS
ECHO LV IVSD: 1.3 CM (ref 0.6–0.9)
ECHO LV MASS 2D: 169.4 G (ref 67–162)
ECHO LV MASS 3D INDEX: 72.9 G/M2
ECHO LV MASS 3D: 124 G
ECHO LV MASS INDEX 2D: 99.6 G/M2 (ref 43–95)
ECHO LV POSTERIOR WALL DIASTOLIC: 1.2 CM (ref 0.6–0.9)
ECHO LV RELATIVE WALL THICKNESS RATIO: 0.62
ECHO LVOT AREA: 3.5 CM2
ECHO LVOT AV VTI INDEX: 0.34
ECHO LVOT DIAM: 2.1 CM
ECHO LVOT MEAN GRADIENT: 2 MMHG
ECHO LVOT PEAK GRADIENT: 4 MMHG
ECHO LVOT PEAK VELOCITY: 1 M/S
ECHO LVOT STROKE VOLUME INDEX: 48.3 ML/M2
ECHO LVOT SV: 82 ML
ECHO LVOT VTI: 23.7 CM
ECHO MV A VELOCITY: 1.34 M/S
ECHO MV E DECELERATION TIME (DT): 211 MS
ECHO MV E VELOCITY: 0.88 M/S
ECHO MV E/A RATIO: 0.66
ECHO MV E/E' LATERAL: 12.85
ECHO MV E/E' RATIO (AVERAGED): 11.36
ECHO MV E/E' SEPTAL: 9.87
ECHO RA AREA 4C: 14.1 CM2
ECHO RA END SYSTOLIC VOLUME APICAL 4 CHAMBER INDEX BSA: 19 ML/M2
ECHO RA VOLUME: 33 ML
ECHO RIGHT VENTRICULAR SYSTOLIC PRESSURE (RVSP): 39 MMHG
ECHO RV FREE WALL PEAK S': 15.4 CM/S
ECHO RV TAPSE: 2.2 CM (ref 1.7–?)
ECHO TV REGURGITANT MAX VELOCITY: 2.99 M/S
ECHO TV REGURGITANT PEAK GRADIENT: 36 MMHG

## 2025-06-12 PROCEDURE — 93306 TTE W/DOPPLER COMPLETE: CPT

## 2025-06-12 PROCEDURE — 93306 TTE W/DOPPLER COMPLETE: CPT | Performed by: INTERNAL MEDICINE

## 2025-06-13 ENCOUNTER — RESULTS FOLLOW-UP (OUTPATIENT)
Dept: CARDIOLOGY CLINIC | Age: 85
End: 2025-06-13

## 2025-06-15 DIAGNOSIS — E78.1 HYPERTRIGLYCERIDEMIA: Chronic | ICD-10-CM

## 2025-06-15 DIAGNOSIS — E78.5 DYSLIPIDEMIA WITH HIGH LDL AND LOW HDL: ICD-10-CM

## 2025-06-16 RX ORDER — ATORVASTATIN CALCIUM 40 MG/1
40 TABLET, FILM COATED ORAL DAILY
Qty: 90 TABLET | Refills: 2 | Status: SHIPPED | OUTPATIENT
Start: 2025-06-16

## 2025-06-16 NOTE — TELEPHONE ENCOUNTER
Last Office Visit: 5/12/2025 Provider: ZENIA  **Is provider OOT? No    Next Office Visit: Visit date not found Provider: ZENIA  **If no OV, when does pt need to be seen? in 1 year(s)  Lab orders needed? no   Encounter provider correct? Yes If not, change provider  Script changes since last refill? no    LAST LABS:   Lipid:  Lab Results   Component Value Date    CHOL 165 02/11/2025    TRIG 229 (H) 02/11/2025    HDL 35 (L) 02/11/2025    LDL 84 02/11/2025    VLDL 46 02/11/2025    CHOLHDLRATIO 5.2 (H) 01/21/2016     CMP:  Lab Results   Component Value Date     02/11/2025    K 4.2 02/11/2025     02/11/2025    CO2 23 02/11/2025    BUN 19 04/22/2025    CREATININE 0.6 04/22/2025    GLUCOSE 89 02/11/2025    CALCIUM 9.9 02/11/2025    BILITOT 0.3 02/11/2025    ALKPHOS 47 02/11/2025    AST 15 02/11/2025    ALT 15 02/11/2025    LABGLOM 89 04/22/2025    GFRAA >60 07/18/2022    AGRATIO 1.6 02/11/2025    GLOB 3.0 10/18/2021          **Care Everywhere? N/A

## 2025-07-14 DIAGNOSIS — E78.2 MIXED HYPERLIPIDEMIA: ICD-10-CM

## 2025-07-14 DIAGNOSIS — I25.10 CORONARY ARTERY CALCIFICATION SEEN ON CAT SCAN: ICD-10-CM

## 2025-07-14 DIAGNOSIS — I10 ESSENTIAL HYPERTENSION: ICD-10-CM

## 2025-07-15 RX ORDER — FENOFIBRATE 54 MG/1
54 TABLET ORAL DAILY
Qty: 90 TABLET | Refills: 2 | Status: SHIPPED | OUTPATIENT
Start: 2025-07-15

## 2025-07-15 RX ORDER — LOSARTAN POTASSIUM AND HYDROCHLOROTHIAZIDE 12.5; 1 MG/1; MG/1
1 TABLET ORAL DAILY
Qty: 90 TABLET | Refills: 1 | Status: SHIPPED | OUTPATIENT
Start: 2025-07-15

## 2025-07-15 NOTE — TELEPHONE ENCOUNTER
Marianela Quintana is requesting refill(s) metformin  Last OV 5/27/25 (pertaining to medication)  LR 11/5/24 (per medication requested)  Next office visit scheduled or attempted Yes   If no, reason:  9/3/25      Marianela Quintana is requesting refill(s) losartan/hctz  Last OV 5/27/25 (pertaining to medication)  LR 11/5/24 (per medication requested)  Next office visit scheduled or attempted Yes   If no, reason:  9/3/25

## 2025-07-15 NOTE — TELEPHONE ENCOUNTER
Last Office Visit: 5/12/2025 Provider: ZENIA  **Is provider OOT? No    Next Office Visit: no   Return in about 1 year    LAST LABS:   BMP:  Lab Results   Component Value Date/Time     02/11/2025 01:13 PM    K 4.2 02/11/2025 01:13 PM    K 4.0 04/24/2023 09:56 AM     02/11/2025 01:13 PM    CO2 23 02/11/2025 01:13 PM    BUN 19 04/22/2025 12:29 PM    CREATININE 0.6 04/22/2025 12:29 PM    GLUCOSE 89 02/11/2025 01:13 PM    GLUCOSE 88 07/22/2011 12:00 PM    CALCIUM 9.9 02/11/2025 01:13 PM    LABGLOM 89 04/22/2025 12:29 PM    LABGLOM >60 01/23/2024 11:29 AM

## 2025-07-21 ENCOUNTER — OFFICE VISIT (OUTPATIENT)
Dept: ORTHOPEDIC SURGERY | Age: 85
End: 2025-07-21
Payer: MEDICARE

## 2025-07-21 VITALS — HEIGHT: 59 IN | WEIGHT: 166 LBS | BODY MASS INDEX: 33.47 KG/M2

## 2025-07-21 DIAGNOSIS — M54.16 LUMBAR RADICULITIS: ICD-10-CM

## 2025-07-21 DIAGNOSIS — G89.4 CHRONIC PAIN SYNDROME: ICD-10-CM

## 2025-07-21 PROCEDURE — G8427 DOCREV CUR MEDS BY ELIG CLIN: HCPCS | Performed by: PHYSICIAN ASSISTANT

## 2025-07-21 PROCEDURE — 1159F MED LIST DOCD IN RCRD: CPT | Performed by: PHYSICIAN ASSISTANT

## 2025-07-21 PROCEDURE — 99214 OFFICE O/P EST MOD 30 MIN: CPT | Performed by: PHYSICIAN ASSISTANT

## 2025-07-21 PROCEDURE — G8417 CALC BMI ABV UP PARAM F/U: HCPCS | Performed by: PHYSICIAN ASSISTANT

## 2025-07-21 PROCEDURE — 1125F AMNT PAIN NOTED PAIN PRSNT: CPT | Performed by: PHYSICIAN ASSISTANT

## 2025-07-21 PROCEDURE — G8400 PT W/DXA NO RESULTS DOC: HCPCS | Performed by: PHYSICIAN ASSISTANT

## 2025-07-21 PROCEDURE — 80305 DRUG TEST PRSMV DIR OPT OBS: CPT | Performed by: PHYSICIAN ASSISTANT

## 2025-07-21 PROCEDURE — 1123F ACP DISCUSS/DSCN MKR DOCD: CPT | Performed by: PHYSICIAN ASSISTANT

## 2025-07-21 PROCEDURE — 1090F PRES/ABSN URINE INCON ASSESS: CPT | Performed by: PHYSICIAN ASSISTANT

## 2025-07-21 PROCEDURE — 1036F TOBACCO NON-USER: CPT | Performed by: PHYSICIAN ASSISTANT

## 2025-07-21 RX ORDER — HYDROCODONE BITARTRATE AND ACETAMINOPHEN 5; 325 MG/1; MG/1
1 TABLET ORAL DAILY PRN
Qty: 30 TABLET | Refills: 0 | Status: SHIPPED | OUTPATIENT
Start: 2025-08-01 | End: 2025-08-31

## 2025-07-21 RX ORDER — HYDROCODONE BITARTRATE AND ACETAMINOPHEN 5; 325 MG/1; MG/1
1 TABLET ORAL DAILY PRN
Qty: 30 TABLET | Refills: 0 | Status: SHIPPED | OUTPATIENT
Start: 2025-08-30 | End: 2025-09-29

## 2025-07-21 RX ORDER — HYDROCODONE BITARTRATE AND ACETAMINOPHEN 5; 325 MG/1; MG/1
1 TABLET ORAL DAILY PRN
Qty: 30 TABLET | Refills: 0 | Status: SHIPPED | OUTPATIENT
Start: 2025-09-27 | End: 2025-10-27

## 2025-07-21 NOTE — PROGRESS NOTES
SPINE: Follow Up     CHIEF COMPLAINT: Chronic worsening low back pain     HISTORY OF PRESENT ILLNESS:                The patient is a 85 y.o. female s/p remote lumbar decompression (Dr. Colvin), here for chronic worsening back pain and leg pain. She reports chronic aching (LEFT) low back/buttock pain radiating into the right LE (lateral aspect).  She states over the last few months her symptoms have been worsening predominantly in the low back and left buttock region.  Pain will still radiate into the right lateral thigh/calf to the foot with tingling.  Her low back pain is most bothersome.  Updated lumbar MRI scan showed moderate central stenosis L3-4, multilevel DDD with foraminal stenosis.  Her pain is increased with prolonged activity--walking or standing, bending, lifting.  She reports pain after prolonged house hold activities and as the day progresses. She still reports significant relief with Norco 5mg qd PRN, rest and heat. MDP did provide good but temporary relief of recent flareup.  She did have a pain management consultation with Dr. Saenz whom discussed considering a caudal KILO.  She does feel that her flareup has improved some.  She is s/p left lower lobectomy with Dr. Cox for lung cancer--still following. Her low back pain is typically well managed with Norco 5/325 I po qd PRN (chronic) without side effects. Medication does allow her to be more functional--able to perform ADLs independently, travel.  She has a sister that lives in [place] that she visits. She is typically able to sleep >6hr/night. She is able to care for her 9 cats.  She denies any progressive numbness tingling or progressive LE weakness.  No current fevers chills or infections.     Current/Past Treatment:   Physical Therapy: YES  Chiropractic: no  Injection: Multiple prior injections & procedures: ESIs, RFN: Dr. Pantoja, R IA hip inj--Dr. Park  Medications: Norco 5mg I po qd PRN int with Dr. Knight, prior oral steroids,

## 2025-08-06 PROBLEM — M48.062 LUMBAR STENOSIS WITH NEUROGENIC CLAUDICATION: Status: ACTIVE | Noted: 2025-08-06

## 2025-08-06 PROBLEM — M47.816 SPONDYLOSIS OF LUMBAR REGION WITHOUT MYELOPATHY OR RADICULOPATHY: Status: ACTIVE | Noted: 2021-06-29

## 2025-09-03 ENCOUNTER — OFFICE VISIT (OUTPATIENT)
Dept: FAMILY MEDICINE CLINIC | Age: 85
End: 2025-09-03

## 2025-09-03 VITALS
HEIGHT: 59 IN | HEART RATE: 77 BPM | WEIGHT: 161.6 LBS | BODY MASS INDEX: 32.58 KG/M2 | SYSTOLIC BLOOD PRESSURE: 144 MMHG | DIASTOLIC BLOOD PRESSURE: 68 MMHG | OXYGEN SATURATION: 96 %

## 2025-09-03 DIAGNOSIS — E11.69 TYPE 2 DIABETES MELLITUS WITH OTHER SPECIFIED COMPLICATION, WITHOUT LONG-TERM CURRENT USE OF INSULIN (HCC): Primary | ICD-10-CM

## 2025-09-03 DIAGNOSIS — Z79.85 DIABETES MELLITUS TREATED WITH INJECTIONS OF NON-INSULIN MEDICATION (HCC): ICD-10-CM

## 2025-09-03 DIAGNOSIS — F11.20 OPIOID DEPENDENCE WITH CURRENT USE (HCC): ICD-10-CM

## 2025-09-03 DIAGNOSIS — I25.84 CORONARY ARTERY DISEASE DUE TO CALCIFIED CORONARY LESION: ICD-10-CM

## 2025-09-03 DIAGNOSIS — I10 ESSENTIAL HYPERTENSION: ICD-10-CM

## 2025-09-03 DIAGNOSIS — E11.9 DIABETES MELLITUS TREATED WITH INJECTIONS OF NON-INSULIN MEDICATION (HCC): ICD-10-CM

## 2025-09-03 DIAGNOSIS — Z85.118 HISTORY OF LUNG CANCER: ICD-10-CM

## 2025-09-03 DIAGNOSIS — I35.0 AORTIC VALVE STENOSIS, ETIOLOGY OF CARDIAC VALVE DISEASE UNSPECIFIED: ICD-10-CM

## 2025-09-03 DIAGNOSIS — I25.10 CORONARY ARTERY DISEASE DUE TO CALCIFIED CORONARY LESION: ICD-10-CM

## 2025-09-03 DIAGNOSIS — E78.5 DYSLIPIDEMIA WITH HIGH LDL AND LOW HDL: ICD-10-CM

## 2025-09-03 DIAGNOSIS — J44.9 CHRONIC OBSTRUCTIVE PULMONARY DISEASE, UNSPECIFIED COPD TYPE (HCC): ICD-10-CM

## 2025-09-03 DIAGNOSIS — F32.1 CURRENT MODERATE EPISODE OF MAJOR DEPRESSIVE DISORDER, UNSPECIFIED WHETHER RECURRENT (HCC): ICD-10-CM

## 2025-09-03 LAB
ALBUMIN SERPL-MCNC: 4.6 G/DL (ref 3.4–5)
ALBUMIN/GLOB SERPL: 1.6 {RATIO} (ref 1.1–2.2)
ALP SERPL-CCNC: 47 U/L (ref 40–129)
ALT SERPL-CCNC: 14 U/L (ref 10–40)
ANION GAP SERPL CALCULATED.3IONS-SCNC: 15 MMOL/L (ref 3–16)
AST SERPL-CCNC: 14 U/L (ref 15–37)
BILIRUB SERPL-MCNC: 0.4 MG/DL (ref 0–1)
BUN SERPL-MCNC: 20 MG/DL (ref 7–20)
CALCIUM SERPL-MCNC: 10 MG/DL (ref 8.3–10.6)
CHLORIDE SERPL-SCNC: 100 MMOL/L (ref 99–110)
CHOLEST SERPL-MCNC: 152 MG/DL (ref 0–199)
CO2 SERPL-SCNC: 25 MMOL/L (ref 21–32)
CREAT SERPL-MCNC: 0.7 MG/DL (ref 0.6–1.2)
GFR SERPLBLD CREATININE-BSD FMLA CKD-EPI: 86 ML/MIN/{1.73_M2}
GLUCOSE SERPL-MCNC: 96 MG/DL (ref 70–99)
HBA1C MFR BLD: 6.3 %
HDLC SERPL-MCNC: 33 MG/DL (ref 40–60)
LDLC SERPL CALC-MCNC: 61 MG/DL
POTASSIUM SERPL-SCNC: 4.3 MMOL/L (ref 3.5–5.1)
PROT SERPL-MCNC: 7.5 G/DL (ref 6.4–8.2)
SODIUM SERPL-SCNC: 140 MMOL/L (ref 136–145)
TRIGL SERPL-MCNC: 292 MG/DL (ref 0–150)
VLDLC SERPL CALC-MCNC: 58 MG/DL

## (undated) DEVICE — DRAIN SURG 24FR L5/16IN DIA8MM SIL RND HUBLESS FULL FLUT

## (undated) DEVICE — CHLORAPREP 26ML ORANGE

## (undated) DEVICE — [HIGH FLOW INSUFFLATOR,  DO NOT USE IF PACKAGE IS DAMAGED,  KEEP DRY,  KEEP AWAY FROM SUNLIGHT,  PROTECT FROM HEAT AND RADIOACTIVE SOURCES.]: Brand: PNEUMOSURE

## (undated) DEVICE — SUTURE PERMA-HAND SZ 0 L18IN NONABSORBABLE BLK L30MM FSL 678G

## (undated) DEVICE — TROCAR: Brand: KII FIOS FIRST ENTRY

## (undated) DEVICE — ANTI-FOG SOLUTION WITH FOAM PAD: Brand: DEVON

## (undated) DEVICE — SYRINGE MED 50ML LUERLOCK TIP

## (undated) DEVICE — SOLUTION IV IRRIG POUR BRL 0.9% SODIUM CHL 2F7124

## (undated) DEVICE — SUTURE VCRL + SZ 3-0 L27IN ABSRB WHT CT-1 1/2 CIR VCP258H

## (undated) DEVICE — RELOAD STPL 45MM THCK TISS GRN W/ GRIPPING SURF TECHNOLOGY

## (undated) DEVICE — DRAIN CHST 500ML SUCT DRY SEAL EXPR MINI 500

## (undated) DEVICE — STERILE LATEX POWDER-FREE SURGICAL GLOVESWITH NITRILE COATING: Brand: PROTEXIS

## (undated) DEVICE — STAPLER INT L16CM STD UNIV RELD DISP TRI-STAPLE ENDO GIA

## (undated) DEVICE — RELOAD STPL 2.5MM L60MM 0DEG VASC TISS TAN TI 6 ROW LIN

## (undated) DEVICE — Device

## (undated) DEVICE — TISSUE RETRIEVAL SYSTEM: Brand: INZII RETRIEVAL SYSTEM

## (undated) DEVICE — TROCAR: Brand: KII SLEEVE

## (undated) DEVICE — SUTURE VCRL SZ 0 L36IN ABSRB UD CT-1 L36MM 1/2 CIR TAPR PNT VCP946H

## (undated) DEVICE — SUTURE MCRYL + SZ 4-0 L18IN ABSRB UD L19MM PS-2 3/8 CIR MCP496G

## (undated) DEVICE — PROBE ABLATN NERVE BLOCK 180 DEG 8 MM BALL TIP CRYOSPHERE

## (undated) DEVICE — ELECTRODE LAP L36CM PTFE WIRE J HK CLEANCOAT

## (undated) DEVICE — CONNECTOR PERF W0.25XH3/8IN BASE Y SHP REDUC W/O LUERLOCK

## (undated) DEVICE — RELOAD STPL 3.5MM L60MM 0DEG UNIV TISS PUR TI 6 ROW LIN

## (undated) DEVICE — BAG RETRIEVAL SPECIMEN SUPERBAG 15 2XL NYLON ITRODUCER

## (undated) DEVICE — SUTURE NONABSORBABLE MONOFILAMENT 4-0 RB-1 36 IN BLU PROLENE 8557H

## (undated) DEVICE — RELOAD STPL L45MM VASCULAR/MEDIUM TISS TAN CRV TIP ARTC